# Patient Record
Sex: FEMALE | Race: BLACK OR AFRICAN AMERICAN | Employment: OTHER | URBAN - METROPOLITAN AREA
[De-identification: names, ages, dates, MRNs, and addresses within clinical notes are randomized per-mention and may not be internally consistent; named-entity substitution may affect disease eponyms.]

---

## 2017-11-08 ENCOUNTER — OFFICE VISIT (OUTPATIENT)
Dept: ENT CLINIC | Age: 72
End: 2017-11-08

## 2017-11-08 VITALS — SYSTOLIC BLOOD PRESSURE: 161 MMHG | DIASTOLIC BLOOD PRESSURE: 84 MMHG | HEART RATE: 79 BPM

## 2017-11-08 DIAGNOSIS — R22.1 NECK MASS: Primary | ICD-10-CM

## 2017-11-08 PROCEDURE — G8427 DOCREV CUR MEDS BY ELIG CLIN: HCPCS | Performed by: OTOLARYNGOLOGY

## 2017-11-08 PROCEDURE — 99203 OFFICE O/P NEW LOW 30 MIN: CPT | Performed by: OTOLARYNGOLOGY

## 2017-11-08 PROCEDURE — 3014F SCREEN MAMMO DOC REV: CPT | Performed by: OTOLARYNGOLOGY

## 2017-11-08 PROCEDURE — 3017F COLORECTAL CA SCREEN DOC REV: CPT | Performed by: OTOLARYNGOLOGY

## 2017-11-08 PROCEDURE — 4040F PNEUMOC VAC/ADMIN/RCVD: CPT | Performed by: OTOLARYNGOLOGY

## 2017-11-08 PROCEDURE — G8484 FLU IMMUNIZE NO ADMIN: HCPCS | Performed by: OTOLARYNGOLOGY

## 2017-11-08 PROCEDURE — G8400 PT W/DXA NO RESULTS DOC: HCPCS | Performed by: OTOLARYNGOLOGY

## 2017-11-08 PROCEDURE — 1036F TOBACCO NON-USER: CPT | Performed by: OTOLARYNGOLOGY

## 2017-11-08 PROCEDURE — 1123F ACP DISCUSS/DSCN MKR DOCD: CPT | Performed by: OTOLARYNGOLOGY

## 2017-11-08 PROCEDURE — 1090F PRES/ABSN URINE INCON ASSESS: CPT | Performed by: OTOLARYNGOLOGY

## 2017-11-08 PROCEDURE — G8421 BMI NOT CALCULATED: HCPCS | Performed by: OTOLARYNGOLOGY

## 2017-11-08 RX ORDER — CLINDAMYCIN HYDROCHLORIDE 300 MG/1
300 CAPSULE ORAL 3 TIMES DAILY
Qty: 30 CAPSULE | Refills: 0 | Status: SHIPPED | OUTPATIENT
Start: 2017-11-08 | End: 2020-06-17 | Stop reason: ALTCHOICE

## 2017-11-08 RX ORDER — LISINOPRIL 40 MG/1
TABLET ORAL
Refills: 5 | Status: ON HOLD | COMMUNITY
Start: 2017-10-30 | End: 2022-08-22 | Stop reason: SDUPTHER

## 2017-11-08 RX ORDER — AMLODIPINE BESYLATE 10 MG/1
10 TABLET ORAL DAILY
Status: ON HOLD | COMMUNITY
Start: 2014-04-10

## 2017-11-08 RX ORDER — GABAPENTIN 300 MG/1
300 CAPSULE ORAL 2 TIMES DAILY
Status: ON HOLD | COMMUNITY

## 2017-11-08 RX ORDER — ASPIRIN 81 MG/1
81 TABLET, CHEWABLE ORAL
Status: ON HOLD | COMMUNITY
Start: 2012-10-02 | End: 2018-06-09 | Stop reason: HOSPADM

## 2017-11-08 RX ORDER — BUSPIRONE HYDROCHLORIDE 7.5 MG/1
TABLET ORAL
Refills: 5 | Status: ON HOLD | COMMUNITY
Start: 2017-10-20

## 2017-11-08 ASSESSMENT — ENCOUNTER SYMPTOMS
SINUS PRESSURE: 0
EYES NEGATIVE: 1
TROUBLE SWALLOWING: 0
VOICE CHANGE: 0
ALLERGIC/IMMUNOLOGIC NEGATIVE: 1
SINUS PAIN: 0
SORE THROAT: 0
RHINORRHEA: 0
RESPIRATORY NEGATIVE: 1

## 2017-11-11 LAB
ANAEROBIC CULTURE: ABNORMAL
GRAM STAIN RESULT: ABNORMAL
WOUND/ABSCESS: ABNORMAL

## 2018-06-07 PROBLEM — G45.9 TIA (TRANSIENT ISCHEMIC ATTACK): Status: ACTIVE | Noted: 2018-06-07

## 2020-06-17 ENCOUNTER — APPOINTMENT (OUTPATIENT)
Dept: GENERAL RADIOLOGY | Age: 75
End: 2020-06-17
Payer: MEDICARE

## 2020-06-17 ENCOUNTER — HOSPITAL ENCOUNTER (EMERGENCY)
Age: 75
Discharge: HOME | End: 2020-06-17
Attending: EMERGENCY MEDICINE
Payer: MEDICARE

## 2020-06-17 ENCOUNTER — APPOINTMENT (OUTPATIENT)
Dept: CT IMAGING | Age: 75
End: 2020-06-17
Payer: MEDICARE

## 2020-06-17 VITALS
WEIGHT: 170 LBS | OXYGEN SATURATION: 98 % | RESPIRATION RATE: 20 BRPM | BODY MASS INDEX: 28.32 KG/M2 | HEIGHT: 65 IN | SYSTOLIC BLOOD PRESSURE: 146 MMHG | HEART RATE: 74 BPM | DIASTOLIC BLOOD PRESSURE: 88 MMHG | TEMPERATURE: 97.9 F

## 2020-06-17 PROBLEM — R53.1 WEAKNESS: Status: ACTIVE | Noted: 2020-06-17

## 2020-06-17 LAB
ANION GAP SERPL CALCULATED.3IONS-SCNC: 8 MMOL/L (ref 3–16)
APTT: 28.8 SEC (ref 24.2–36.2)
BASOPHILS ABSOLUTE: 0.1 K/UL (ref 0–0.2)
BASOPHILS RELATIVE PERCENT: 0.9 %
BILIRUBIN URINE: NEGATIVE
BLOOD, URINE: NEGATIVE
BUN BLDV-MCNC: 22 MG/DL (ref 7–20)
CALCIUM SERPL-MCNC: 9.6 MG/DL (ref 8.3–10.6)
CHLORIDE BLD-SCNC: 104 MMOL/L (ref 99–110)
CLARITY: CLEAR
CO2: 27 MMOL/L (ref 21–32)
COLOR: YELLOW
CREAT SERPL-MCNC: 0.8 MG/DL (ref 0.6–1.2)
EKG ATRIAL RATE: 75 BPM
EKG DIAGNOSIS: NORMAL
EKG P AXIS: 51 DEGREES
EKG P-R INTERVAL: 172 MS
EKG Q-T INTERVAL: 446 MS
EKG QRS DURATION: 156 MS
EKG QTC CALCULATION (BAZETT): 498 MS
EKG R AXIS: -46 DEGREES
EKG T AXIS: 48 DEGREES
EKG VENTRICULAR RATE: 75 BPM
EOSINOPHILS ABSOLUTE: 0.2 K/UL (ref 0–0.6)
EOSINOPHILS RELATIVE PERCENT: 3.4 %
EPITHELIAL CELLS, UA: 1 /HPF (ref 0–5)
GFR AFRICAN AMERICAN: >60
GFR NON-AFRICAN AMERICAN: >60
GLUCOSE BLD-MCNC: 117 MG/DL (ref 70–99)
GLUCOSE BLD-MCNC: 132 MG/DL (ref 70–99)
GLUCOSE URINE: NEGATIVE MG/DL
HCT VFR BLD CALC: 35.3 % (ref 36–48)
HEMOGLOBIN: 11.6 G/DL (ref 12–16)
HYALINE CASTS: 1 /LPF (ref 0–8)
INR BLD: 0.97 (ref 0.86–1.14)
KETONES, URINE: NEGATIVE MG/DL
LEUKOCYTE ESTERASE, URINE: NEGATIVE
LYMPHOCYTES ABSOLUTE: 1.5 K/UL (ref 1–5.1)
LYMPHOCYTES RELATIVE PERCENT: 25.5 %
MCH RBC QN AUTO: 26.9 PG (ref 26–34)
MCHC RBC AUTO-ENTMCNC: 32.9 G/DL (ref 31–36)
MCV RBC AUTO: 81.8 FL (ref 80–100)
MICROSCOPIC EXAMINATION: YES
MONOCYTES ABSOLUTE: 0.4 K/UL (ref 0–1.3)
MONOCYTES RELATIVE PERCENT: 7.2 %
NEUTROPHILS ABSOLUTE: 3.7 K/UL (ref 1.7–7.7)
NEUTROPHILS RELATIVE PERCENT: 63 %
NITRITE, URINE: NEGATIVE
PDW BLD-RTO: 14.2 % (ref 12.4–15.4)
PERFORMED ON: ABNORMAL
PH UA: 7.5 (ref 5–8)
PLATELET # BLD: 249 K/UL (ref 135–450)
PMV BLD AUTO: 7.7 FL (ref 5–10.5)
POTASSIUM REFLEX MAGNESIUM: 3.7 MMOL/L (ref 3.5–5.1)
PRO-BNP: 448 PG/ML (ref 0–449)
PROTEIN UA: 30 MG/DL
PROTHROMBIN TIME: 11.2 SEC (ref 10–13.2)
RBC # BLD: 4.32 M/UL (ref 4–5.2)
RBC UA: 4 /HPF (ref 0–4)
SODIUM BLD-SCNC: 139 MMOL/L (ref 136–145)
SPECIFIC GRAVITY UA: 1.01 (ref 1–1.03)
TROPONIN: <0.01 NG/ML
URINE REFLEX TO CULTURE: ABNORMAL
URINE TYPE: ABNORMAL
UROBILINOGEN, URINE: 0.2 E.U./DL
WBC # BLD: 5.9 K/UL (ref 4–11)
WBC UA: 1 /HPF (ref 0–5)

## 2020-06-17 PROCEDURE — 81001 URINALYSIS AUTO W/SCOPE: CPT

## 2020-06-17 PROCEDURE — 2580000003 HC RX 258: Performed by: NURSE PRACTITIONER

## 2020-06-17 PROCEDURE — 84484 ASSAY OF TROPONIN QUANT: CPT

## 2020-06-17 PROCEDURE — 80048 BASIC METABOLIC PNL TOTAL CA: CPT

## 2020-06-17 PROCEDURE — 72125 CT NECK SPINE W/O DYE: CPT

## 2020-06-17 PROCEDURE — 85730 THROMBOPLASTIN TIME PARTIAL: CPT

## 2020-06-17 PROCEDURE — 85610 PROTHROMBIN TIME: CPT

## 2020-06-17 PROCEDURE — 93005 ELECTROCARDIOGRAM TRACING: CPT | Performed by: NURSE PRACTITIONER

## 2020-06-17 PROCEDURE — 93010 ELECTROCARDIOGRAM REPORT: CPT | Performed by: INTERNAL MEDICINE

## 2020-06-17 PROCEDURE — 85025 COMPLETE CBC W/AUTO DIFF WBC: CPT

## 2020-06-17 PROCEDURE — 70450 CT HEAD/BRAIN W/O DYE: CPT

## 2020-06-17 PROCEDURE — 99284 EMERGENCY DEPT VISIT MOD MDM: CPT

## 2020-06-17 PROCEDURE — 71045 X-RAY EXAM CHEST 1 VIEW: CPT

## 2020-06-17 PROCEDURE — 1200000000 HC SEMI PRIVATE

## 2020-06-17 PROCEDURE — 83880 ASSAY OF NATRIURETIC PEPTIDE: CPT

## 2020-06-17 RX ORDER — 0.9 % SODIUM CHLORIDE 0.9 %
1000 INTRAVENOUS SOLUTION INTRAVENOUS ONCE
Status: COMPLETED | OUTPATIENT
Start: 2020-06-17 | End: 2020-06-17

## 2020-06-17 RX ADMIN — SODIUM CHLORIDE 1000 ML: 9 INJECTION, SOLUTION INTRAVENOUS at 12:45

## 2020-06-17 ASSESSMENT — ENCOUNTER SYMPTOMS
ABDOMINAL PAIN: 0
VOMITING: 0
SORE THROAT: 0
SHORTNESS OF BREATH: 0
NAUSEA: 0
RHINORRHEA: 0
DIARRHEA: 0
CONSTIPATION: 0
BLOOD IN STOOL: 0

## 2020-06-17 NOTE — ED PROVIDER NOTES
[address] [facility]        Pt Name: Mindi Joseph Saturday  MRN: [id number]  Armsjjgfmeng 1945  Date of evaluation: 6/17/2020  Provider: STACEY Wiggins CNP  PCP: [facility] (Inactive)    This patient was seen and evaluated by the attending physician Scott Bergeron MD.    [address] [place] [address]       Chief Complaint   Patient presents with    Dizziness     Pt comes in today by [facility] from home. Pt fell sometime over the weekend and hit the back of head. Denies LOC. Pt states feeling lightheaded and dizzy since then        HISTORY OF PRESENT ILLNESS   (Location/Symptom, Timing/Onset,Context/Setting, Quality, Duration, Modifying Factors, Severity)  Note limiting factors. Mindi Joseph Saturday is a 67 y.o. female who presents emergency department with concern for evaluation after a fall. She reports to nursing that she fell over the weekend but tells me it may have happened sometime last week. She is a very poor historian. It does appear that there is history of stroke in her chart. She supposed to be taking Plavix according to her medical record but denies this. She reports that she will take baby aspirin, \"from time to time\" when she remembers. She reports that she does live with her son and 2 grandsons. She ambulates independently without a walker at baseline. There is no loss of consciousness after her fall. She also denies head injury. She reports it was a trip over her own 2 feet with no syncope. She denies fever, rash, headaches, chest pain, shortness of breath, cough, congestion, abdominal pain, nausea, vomiting, diarrhea, constipation, blood in the stool, or painful urination. No family at bedside. Nursing Notes triage note reviewed and agreed with or any disagreements were addressed  in the HPI.     REVIEW OF SYSTEMS    (2-9 systems for level 4, 10 or more for level 5)     Review of Systems making. CONSULTS:  IP CONSULT TO HOSPITALIST      EMERGENCY DEPARTMENT COURSE and DIFFERENTIAL DIAGNOSIS/MDM:   Vitals:    Vitals:    06/17/20 1025 06/17/20 1030 06/17/20 1300   BP:  (!) 162/73 (!) 146/88   Pulse: 76  74   Resp: 18  20   Temp: 97.9 °F (36.6 °C)     TempSrc: Oral     SpO2: 98%     Weight: 170 lb (77.1 kg)     Height: 5' 5\" (1.651 m)         Naomie Saturday was given the following medications:  Medications   0.9 % sodium chloride bolus (1,000 mLs Intravenous New Bag 6/17/20 1245)       Seferino Dyson Saturday was evaluated in the emergency department with concern for dizziness and lightheadedness. Truncal ataxia noted on my initial exam, however this resolved while she was in the ER. She is given IV fluids. Laboratory evaluation and imaging performed. This is all grossly negative. Nursing was able to get the patient up and ambulate her. She is not orthostatic on vital signs. She did walk with a walker with assistance. She reports that she is still dizzy and lightheaded. For this reason I feel inpatient management is warranted. We did consult the hospitalist who is in agreement for admission, however when my attending went back to update the patient she declined admission. She would prefer to go home. She did have a recent work-up for TIA. She was instructed to continue taking her Plavix and follow-up with PCP using shared decision-making model. The neurological exam is intact. At this time there is no indication of cardiac arrhythmia, PE, CVA, seizures, dehydration, hypoglycemia, subarachnoid hemorrhage or hypotension. TIA is within the differentials. My suspicion is low for serious injury including fracture, dislocation, compartment syndrome, or intracranial hemorrhage. The injury sounds consistent with mechanical fall. My suspicion is low for arrhythmia, stroke, seizure, abuse, or PE. Naomie Saturday is stable in the ER and safe to follow as an outpatient.   The patient

## 2020-06-17 NOTE — ED NOTES
Bed: 15  Expected date:   Expected time:   Means of arrival:   Comments:  sheldon Ernst RN  06/17/20 1024

## 2020-06-17 NOTE — ED NOTES
Pt comes in today by [facility] from home due to dizziness. Pt states she fell over the weekend and hit the back of her head. Denies LOC. Just c/o dizziness and lightheadedness since then. Pt alert and oriented. Pt has HX of stroke. VSS. IV started and labs obtained per order. Pt tolerated well. Pt connected to monitors and BP set to cycle. Updated pt on POC; verbalized understanding. Siderails up x 2, call light within reach. Pt denies any further needs at this time. Will continue to monitor.         Nayla Tiwari RN  06/17/20 5448

## 2020-06-17 NOTE — ED PROVIDER NOTES
alternatives to staying in the hospital. She wants to leave. I encouraged to continue taking medications at home including the atorvastatin and captopril and so she will follow-up with primary care doctor. Patient Referrals: Your PCP            Discharge Medications:  New Prescriptions    No medications on file       FINAL IMPRESSION  1. TIA (transient ischemic attack)    2. Near syncope    3. Vertigo        Blood pressure (!) 146/88, pulse 74, temperature 97.9 °F (36.6 °C), temperature source Oral, resp. rate 20, height 5' 5\" (1.651 m), weight 170 lb (77.1 kg), SpO2 98 %. For further details of [facility] emergency department encounter, please see documentation by advanced practice providerMelanie.         Promise Tariq MD  06/17/20 1149       Promise Tariq MD  06/17/20 1411       Promise Tariq MD  06/17/20 96 Kalpesh Felix MD  06/17/20 6795

## 2021-01-01 ENCOUNTER — APPOINTMENT (OUTPATIENT)
Dept: GENERAL RADIOLOGY | Age: 76
End: 2021-01-01
Payer: MEDICARE

## 2021-01-01 ENCOUNTER — HOSPITAL ENCOUNTER (EMERGENCY)
Age: 76
Discharge: HOME | End: 2021-01-01
Payer: MEDICARE

## 2021-01-01 VITALS
SYSTOLIC BLOOD PRESSURE: 168 MMHG | OXYGEN SATURATION: 96 % | DIASTOLIC BLOOD PRESSURE: 93 MMHG | TEMPERATURE: 97 F | BODY MASS INDEX: 28.32 KG/M2 | HEART RATE: 100 BPM | WEIGHT: 170 LBS | HEIGHT: 65 IN | RESPIRATION RATE: 18 BRPM

## 2021-01-01 DIAGNOSIS — M25.50 POLYARTHRALGIA: ICD-10-CM

## 2021-01-01 DIAGNOSIS — M25.462 EFFUSION OF LEFT KNEE: Primary | ICD-10-CM

## 2021-01-01 PROCEDURE — 73130 X-RAY EXAM OF HAND: CPT

## 2021-01-01 PROCEDURE — 99284 EMERGENCY DEPT VISIT MOD MDM: CPT

## 2021-01-01 PROCEDURE — 73562 X-RAY EXAM OF KNEE 3: CPT

## 2021-01-01 PROCEDURE — 73110 X-RAY EXAM OF WRIST: CPT

## 2021-01-01 PROCEDURE — 6370000000 HC RX 637 (ALT 250 FOR IP): Performed by: PHYSICIAN ASSISTANT

## 2021-01-01 RX ORDER — ONDANSETRON 4 MG/1
4 TABLET, ORALLY DISINTEGRATING ORAL ONCE
Status: COMPLETED | OUTPATIENT
Start: 2021-01-01 | End: 2021-01-01

## 2021-01-01 RX ORDER — HYDROCODONE BITARTRATE AND ACETAMINOPHEN 5; 325 MG/1; MG/1
1 TABLET ORAL EVERY 6 HOURS PRN
Qty: 10 TABLET | Refills: 0 | Status: SHIPPED | OUTPATIENT
Start: 2021-01-01 | End: 2021-01-04

## 2021-01-01 RX ORDER — HYDROCODONE BITARTRATE AND ACETAMINOPHEN 5; 325 MG/1; MG/1
2 TABLET ORAL ONCE
Status: COMPLETED | OUTPATIENT
Start: 2021-01-01 | End: 2021-01-01

## 2021-01-01 RX ORDER — ONDANSETRON 4 MG/1
4 TABLET, ORALLY DISINTEGRATING ORAL EVERY 8 HOURS PRN
Qty: 20 TABLET | Refills: 0 | Status: ON HOLD | OUTPATIENT
Start: 2021-01-01

## 2021-01-01 RX ADMIN — ONDANSETRON 4 MG: 4 TABLET, ORALLY DISINTEGRATING ORAL at 12:44

## 2021-01-01 RX ADMIN — HYDROCODONE BITARTRATE AND ACETAMINOPHEN 2 TABLET: 5; 325 TABLET ORAL at 12:44

## 2021-01-01 ASSESSMENT — PAIN SCALES - GENERAL: PAINLEVEL_OUTOF10: 0

## 2021-01-01 ASSESSMENT — ENCOUNTER SYMPTOMS
VOMITING: 0
SHORTNESS OF BREATH: 0
COUGH: 0
DIARRHEA: 0
RHINORRHEA: 0
ABDOMINAL PAIN: 0
NAUSEA: 0

## 2021-01-01 NOTE — ED NOTES
RN into room for d/c and pt is not in room.  Wheelchair is not in room, nephew has left w pt, RN will call family member, RN called alberto at [phone], updated on discharge paperwork and rx     Nirmala Ga, EARNEST  01/01/21 Cornelius 24, EARNEST  01/01/21 9348

## 2021-01-01 NOTE — ED NOTES
Pt asking for coffee, pt reports pain improved and zero, pt given warm blanket, pt pulled up in bed, rn asked pt if she has a ride home and gave pt phone to call family to start planning for dc,     Julia Caal RN  01/01/21 0913

## 2021-01-01 NOTE — ED NOTES
rn into apply ace wrap to left knee per order, rn asssited pt to get dressed, pt reports zero pain, rn assisted pt to end of bed and tried to assist to wheel chair,pt refused, pt asked why rn was rushing her and thought she would be staying a night in the hospital, rn explained her pain is resolved and there is no need, rn explained she has follow up visits next week and her sister was notified and would like her home. RN continued to assist pt to stand and pt pushed against RN. RN asked Jenifer Be RN to help  Assist pt up, pt continued to refused and push against both rns. rn placed pt back to bed and side rails up x2, rn notified Neena Baumgarten PA, was told pt is to go home and place pt in wheelchair per daughters request. Per daughter pt is in wheelchair all day. RN notified charge rn,   RN went to entry to ask nephew to assist rn to help pt get to wheelchair for d/c. At same time, dejan tohmas communicated w pt and pt communicated she was scared. Pt is ok to go home. Nephew is now at bs w rn and pt and plan is to go home. Will do d/c with pt and family member.  Pt is doing well in wheelchair     [address], RN  01/01/21 826 [facility], RN  01/01/21 826 [facility], RN  01/01/21 [address] [place] [address], RN  01/01/21 8609

## 2021-01-01 NOTE — ED NOTES
Bed: 30  Expected date:   Expected time:   Means of arrival: [facility]  Comments:  Shoaib Cutler 38 [facility], Critical access hospital0 [facility]  01/01/21 1139

## 2021-01-01 NOTE — ED PROVIDER NOTES
[facility]        Pt Name: Rc Canchola Saturday  MRN: [id number]  Melvagfmeng 1945  Date of evaluation: 1/1/2021  Provider: Satnam Barajas PA-C  PCP: [facility] (Inactive)    DANELLE. I have evaluated this patient. My supervising physician was available for consultation. CHIEF COMPLAINT       Chief Complaint   Patient presents with    Knee Pain     pt from home via em[facility] [place] for left knee pain, left ankle pain, right hand/wrist pain,        HISTORY OF PRESENT ILLNESS   (Location, Timing/Onset, Context/Setting, Quality, Duration, Modifying Factors, Severity, Associated Signs and Symptoms)  Note limiting factors. Rc Canchola Saturday is a 67 y.o. female who presents to the emergency department today for evaluation for left knee pain, right wrist and right hand pain. The patient states that when she woke up this morning she noticed some pain and swelling noted to her right hand and her right wrist.  States that she also had some pain to her left knee. The patient denies falling or injuring herself in any way. She states that mostly she sits in her wheelchair but she was able to stand up and bear some weight on the knee. The patient denies any history of gout. She has no numbness, tingling or weakness. She denies any chest pain or shortness of breath. She has no fever or chills. No cough or congestion. She is no abdominal pain, nausea, vomiting or diarrhea. No urinary symptoms. The patient adds that while she is here she would like to have an area looked at to her left upper thigh. States that she saw her primary care physician for this, and she states that she has had the area drained, she just wants to make sure that there is no further infection. Nursing Notes were all reviewed and agreed with or any disagreements were addressed in the HPI.     REVIEW OF SYSTEMS    (2-9 systems for level 4, 10 or more for level 5) for level 5)     ED Triage Vitals [01/01/21 1148]   BP Temp Temp src Pulse Resp SpO2 Height Weight   (!) 160/81 97 °F (36.1 °C) -- 106 19 98 % 5' 5\" (1.651 m) 170 lb (77.1 kg)       Physical Exam  Vitals signs and nursing note reviewed. Constitutional:       Appearance: She is well-developed. She is not diaphoretic. HENT:      Head: Normocephalic and atraumatic. Right Ear: External ear normal.      Left Ear: External ear normal.      Nose: Nose normal.   Eyes:      General:         Right eye: No discharge. Left eye: No discharge. Neck:      Musculoskeletal: Normal range of motion and neck supple. Trachea: No tracheal deviation. Cardiovascular:      Rate and Rhythm: Normal rate and regular rhythm. Pulses: Normal pulses. Pulmonary:      Effort: Pulmonary effort is normal. No respiratory distress. Breath sounds: Normal breath sounds. No wheezing or rales. Musculoskeletal: Normal range of motion. Comments: There is tenderness, and mild edema noted over the dorsum of the right hand. There is no erythema, significant warmth. She has no tenderness over the anatomic snuffbox. No pain with axial loading. Radial pulses 2+. Normal sensation light touch for neurovascularly intact peer full range of motion of all joints    She does have minimal tenderness, and edema noted of the left knee. Full range of motion of the knee. Dorsalis pedis and posterior tibialis pulse are 2+. Gait normal sensation light touch. Neurovascularly intact. Skin:     General: Skin is warm and dry. Comments: Well-healing 1 cm x 1 cm abscess noted to the left upper thigh. Packing in place. No surrounding erythema. No drainage. No warmth. Neurological:      Mental Status: She is alert and oriented to person, place, and time.    Psychiatric:         Behavior: Behavior normal.         DIAGNOSTIC RESULTS   LABS:    Labs Reviewed - No data to display    All other labs were within normal range or not returned as of this dictation. EKG: All EKG's are interpreted by the Emergency Department Physician in the absence of a cardiologist.  Please see their note for interpretation of EKG. RADIOLOGY:   Non-plain film images such as CT, Ultrasound and MRI are read by the radiologist. Plain radiographic images are visualized and preliminarily interpreted by the ED Provider with the below findings:        Interpretation per the Radiologist below, if available at the time of this note:    XR WRIST RIGHT (MIN 3 VIEWS)   Final Result   No acute findings involving the right wrist or right hand. Large joint effusion at the knee. Tricompartment osteoarthrosis most   prominent medially and patellofemoral compartment laterally. XR HAND RIGHT (MIN 3 VIEWS)   Final Result   No acute findings involving the right wrist or right hand. Large joint effusion at the knee. Tricompartment osteoarthrosis most   prominent medially and patellofemoral compartment laterally. XR KNEE LEFT (3 VIEWS)   Final Result   No acute findings involving the right wrist or right hand. Large joint effusion at the knee. Tricompartment osteoarthrosis most   prominent medially and patellofemoral compartment laterally. No results found.         PROCEDURES   Unless otherwise noted below, none     Procedures    CRITICAL CARE TIME   N/A    CONSULTS:  None      EMERGENCY DEPARTMENT COURSE and DIFFERENTIAL DIAGNOSIS/MDM:   Vitals:    Vitals:    01/01/21 1312 01/01/21 1317 01/01/21 1324 01/01/21 1336   BP:       Pulse: 100 104 98 100   Resp: 19 16 18    Temp:       SpO2:  95% 96%    Weight:       Height:           Patient was given the following medications:  Medications   HYDROcodone-acetaminophen (NORCO) 5-325 MG per tablet 2 tablet (2 tablets Oral Given 1/1/21 1244)   ondansetron (ZOFRAN-ODT) disintegrating tablet 4 mg (4 mg Oral Given 1/1/21 1244)           Briefly, this is a 71-year-old female who presents to the emergency department today for evaluation for right hand pain, right hand swelling, as well as left knee pain left knee swelling. The patient states that she woke up this morning with the symptoms. She denies falling or injuring herself in any way. On exam she does have some tenderness and edema noted to the right hand, right wrist, x-rays are negative    She does have tenderness and minimal edema noted to the left knee, x-ray shows a large joint effusion at the knee, tricompartmental osteoarthrosis. She does have a well-healing wound to the left upper thigh, packing in place, no surrounding erythema, edema or warmth. Discussed the case with the patient's sister, Cynthia Bryant, and she states that the patient is acting at her baseline. She states that the patient does have several appointments next week, and she states that she feels that the patient is okay to go home. The patient does live with her nephew, and another young man I can help her get around. Patient is in the wheelchair at home. I discussed this with the patient, the patient is comfortable going home. She will be given a prescription for Norco and Zofran for at home. She is to obtain follow-up with her primary care physician within 1 to 2 days for reevaluation. She is also referred to orthopedics for knee effusion. Patient is stable for discharge. My suspicions at this time for occult fracture, dislocation, subluxation, arterial occlusion or other emergent etiology. FINAL IMPRESSION      1. Effusion of left knee    2. Polyarthralgia          DISPOSITION/PLAN   DISPOSITION Decision To Discharge 01/01/2021 02:04:37 PM      PATIENT REFERREDTO:  Arturo Caldwell MD  [address] [place] [address].   [address] [place] [address]    Schedule an appointment as soon as possible for a visit in 3 days      [facility] Emergency Department  [address] [place] [address]  [phone]    As needed, If symptoms worsen      DISCHARGE MEDICATIONS:  New Prescriptions    HYDROCODONE-ACETAMINOPHEN (NORCO) 5-325 MG PER TABLET    Take 1 tablet by mouth every 6 hours as needed for Pain for up to 3 days.     ONDANSETRON (ZOFRAN ODT) 4 MG DISINTEGRATING TABLET    Take 1 tablet by mouth every 8 hours as needed for Nausea       DISCONTINUED MEDICATIONS:  Discontinued Medications    No medications on file              (Please note that portions of this note were completed with a voice recognition program.  Efforts were made to edit the dictations but occasionally words are mis-transcribed.)    Catrina Marks PA-C (electronically signed)            Catrina Marks PA-C  01/01/21 1546

## 2021-01-01 NOTE — ED NOTES
Pt in gown, bed locked, call light near, cardiac monitor cycling  Pt belongings placed on chair,     Josie Mohamud, EARNEST  01/01/21 Joey Dubon, RN  01/01/21

## 2021-01-06 ENCOUNTER — TELEPHONE (OUTPATIENT)
Dept: ORTHOPEDIC SURGERY | Age: 76
End: 2021-01-06

## 2022-04-05 ENCOUNTER — APPOINTMENT (OUTPATIENT)
Dept: GENERAL RADIOLOGY | Age: 77
End: 2022-04-05
Payer: MEDICARE

## 2022-04-05 ENCOUNTER — HOSPITAL ENCOUNTER (EMERGENCY)
Age: 77
Discharge: HOME | End: 2022-04-05
Attending: EMERGENCY MEDICINE
Payer: MEDICARE

## 2022-04-05 ENCOUNTER — APPOINTMENT (OUTPATIENT)
Dept: CT IMAGING | Age: 77
End: 2022-04-05
Payer: MEDICARE

## 2022-04-05 VITALS
BODY MASS INDEX: 25.99 KG/M2 | SYSTOLIC BLOOD PRESSURE: 169 MMHG | TEMPERATURE: 98.4 F | WEIGHT: 156 LBS | OXYGEN SATURATION: 96 % | DIASTOLIC BLOOD PRESSURE: 86 MMHG | HEART RATE: 78 BPM | RESPIRATION RATE: 18 BRPM | HEIGHT: 65 IN

## 2022-04-05 DIAGNOSIS — I50.20 SYSTOLIC CONGESTIVE HEART FAILURE, UNSPECIFIED HF CHRONICITY (HCC): ICD-10-CM

## 2022-04-05 DIAGNOSIS — M79.89 LEG SWELLING: Primary | ICD-10-CM

## 2022-04-05 LAB
ANION GAP SERPL CALCULATED.3IONS-SCNC: 10 MMOL/L (ref 3–16)
BASOPHILS ABSOLUTE: 0.1 K/UL (ref 0–0.2)
BASOPHILS RELATIVE PERCENT: 0.8 %
BUN BLDV-MCNC: 19 MG/DL (ref 7–20)
CALCIUM SERPL-MCNC: 9.6 MG/DL (ref 8.3–10.6)
CHLORIDE BLD-SCNC: 105 MMOL/L (ref 99–110)
CO2: 25 MMOL/L (ref 21–32)
CREAT SERPL-MCNC: 0.9 MG/DL (ref 0.6–1.2)
EOSINOPHILS ABSOLUTE: 0.2 K/UL (ref 0–0.6)
EOSINOPHILS RELATIVE PERCENT: 3 %
GFR AFRICAN AMERICAN: >60
GFR NON-AFRICAN AMERICAN: >60
GLUCOSE BLD-MCNC: 166 MG/DL (ref 70–99)
HCT VFR BLD CALC: 34.5 % (ref 36–48)
HEMOGLOBIN: 11 G/DL (ref 12–16)
LYMPHOCYTES ABSOLUTE: 2.8 K/UL (ref 1–5.1)
LYMPHOCYTES RELATIVE PERCENT: 39.9 %
MCH RBC QN AUTO: 25.5 PG (ref 26–34)
MCHC RBC AUTO-ENTMCNC: 31.9 G/DL (ref 31–36)
MCV RBC AUTO: 80 FL (ref 80–100)
MONOCYTES ABSOLUTE: 0.5 K/UL (ref 0–1.3)
MONOCYTES RELATIVE PERCENT: 7.2 %
NEUTROPHILS ABSOLUTE: 3.4 K/UL (ref 1.7–7.7)
NEUTROPHILS RELATIVE PERCENT: 49.1 %
PDW BLD-RTO: 15.3 % (ref 12.4–15.4)
PLATELET # BLD: 290 K/UL (ref 135–450)
PMV BLD AUTO: 7.3 FL (ref 5–10.5)
POTASSIUM REFLEX MAGNESIUM: 3.7 MMOL/L (ref 3.5–5.1)
PRO-BNP: 766 PG/ML (ref 0–449)
RBC # BLD: 4.32 M/UL (ref 4–5.2)
SODIUM BLD-SCNC: 140 MMOL/L (ref 136–145)
TROPONIN: <0.01 NG/ML
WBC # BLD: 6.9 K/UL (ref 4–11)

## 2022-04-05 PROCEDURE — 6360000002 HC RX W HCPCS: Performed by: EMERGENCY MEDICINE

## 2022-04-05 PROCEDURE — 84484 ASSAY OF TROPONIN QUANT: CPT

## 2022-04-05 PROCEDURE — 83880 ASSAY OF NATRIURETIC PEPTIDE: CPT

## 2022-04-05 PROCEDURE — 93005 ELECTROCARDIOGRAM TRACING: CPT | Performed by: EMERGENCY MEDICINE

## 2022-04-05 PROCEDURE — 71045 X-RAY EXAM CHEST 1 VIEW: CPT

## 2022-04-05 PROCEDURE — 36415 COLL VENOUS BLD VENIPUNCTURE: CPT

## 2022-04-05 PROCEDURE — 99284 EMERGENCY DEPT VISIT MOD MDM: CPT

## 2022-04-05 PROCEDURE — 96374 THER/PROPH/DIAG INJ IV PUSH: CPT

## 2022-04-05 PROCEDURE — 85025 COMPLETE CBC W/AUTO DIFF WBC: CPT

## 2022-04-05 PROCEDURE — 80048 BASIC METABOLIC PNL TOTAL CA: CPT

## 2022-04-05 PROCEDURE — 70450 CT HEAD/BRAIN W/O DYE: CPT

## 2022-04-05 PROCEDURE — 6370000000 HC RX 637 (ALT 250 FOR IP): Performed by: EMERGENCY MEDICINE

## 2022-04-05 RX ORDER — METOLAZONE 2.5 MG/1
2.5 TABLET ORAL DAILY
Status: DISCONTINUED | OUTPATIENT
Start: 2022-04-05 | End: 2022-04-05 | Stop reason: HOSPADM

## 2022-04-05 RX ORDER — POTASSIUM CHLORIDE 750 MG/1
10 TABLET, EXTENDED RELEASE ORAL 2 TIMES DAILY
Qty: 180 TABLET | Refills: 1 | Status: ON HOLD | OUTPATIENT
Start: 2022-04-05

## 2022-04-05 RX ORDER — FUROSEMIDE 10 MG/ML
60 INJECTION INTRAMUSCULAR; INTRAVENOUS ONCE
Status: COMPLETED | OUTPATIENT
Start: 2022-04-05 | End: 2022-04-05

## 2022-04-05 RX ORDER — FUROSEMIDE 20 MG/1
20 TABLET ORAL 2 TIMES DAILY
Qty: 60 TABLET | Refills: 0 | Status: ON HOLD | OUTPATIENT
Start: 2022-04-05 | End: 2022-08-22 | Stop reason: SDUPTHER

## 2022-04-05 RX ADMIN — METOLAZONE 2.5 MG: 2.5 TABLET ORAL at 15:57

## 2022-04-05 RX ADMIN — FUROSEMIDE 60 MG: 10 INJECTION, SOLUTION INTRAMUSCULAR; INTRAVENOUS at 15:57

## 2022-04-05 NOTE — ED PROVIDER NOTES
Emergency Department Encounter    Patient: Sampson Galarza Saturday  MRN: [id number]  : 1945  Date of Evaluation: 2022  ED Provider:  Kimberly Hall MD    Triage Chief Complaint:   Leg Swelling (from home bilateral leg swelling, feeling unbalanced x 2 weeks. Hx of diabetes and neuropathy. . AOx4.)    [place]:  Sampson Galarza Saturday is a 65 y.o. female that presents ER for evaluation of acute on chronic lower extremity edema was referred from her visiting home nurse. She denies any active chest pain occasionally complains of remittent dizziness upon waking up she has a prior history of stroke hypertension diabetes and peripheral neuropathy. Complaint lower extremity edema concern about the possibility of decompensated congestive heart failure. No chest pain or shortness of breath no abdominal pain. No acute severe headache, no double vision. No focal motor or sensory deficit new    ROS - see HPI, below listed is current ROS at time of my eval:  General:  No fevers, no chills  Eyes:  No recent vison changes, no discharge  ENT:  No sore throat, no nasal congestion, no hearing changes  Cardiovascular:  No chest pain, no palpitations  Respiratory:  + shortness of breath, no cough, no wheezing  Gastrointestinal:  No pain, no nausea, no vomiting, no diarrhea  Musculoskeletal:  No muscle pain, no joint pain positive lower extremity edema  Skin:  No rash, no pruritis, no easy bruising  Neurologic:  No speech problems, no headache, dizziness  Psychiatric:  No anxiety  Genitourinary:  No dysuria, no hematuria  Endocrine:  No unexpected weight gain, no unexpected weight loss  Extremities:  + edema, no pain    Past Medical History:   Diagnosis Date    Hypertension     Stroke (cerebrum) ([facility] [address]      Past Surgical History:   Procedure Laterality Date    CHOLECYSTECTOMY       History reviewed. No pertinent family history.   Social History     Socioeconomic History    Marital status:      Spouse name: Not on file  Number of children: Not on file    Years of education: Not on file    Highest education level: Not on file   Occupational History    Not on file   Tobacco Use    Smoking status: Never Smoker    Smokeless tobacco: Never Used   Substance and Sexual Activity    Alcohol use: No    Drug use: No    Sexual activity: Not on file   Other Topics Concern    Not on file   Social History Narrative    Not on file     Social Determinants of Health     Financial Resource Strain:     Difficulty of Paying Living Expenses: Not on file   Food Insecurity:     Worried About Running Out of Food in the Last Year: Not on file    Chantel of Food in the Last Year: Not on file   Transportation Needs:     Lack of Transportation (Medical): Not on file    Lack of Transportation (Non-Medical):  Not on file   Physical Activity:     Days of Exercise per Week: Not on file    Minutes of Exercise per Session: Not on file   Stress:     Feeling of Stress : Not on file   Social Connections:     Frequency of Communication with Friends and Family: Not on file    Frequency of Social Gatherings with Friends and Family: Not on file    Attends Islam Services: Not on file    Active Member of [address] [place] [address] or Organizations: Not on file    Attends Club or Organization Meetings: Not on file    Marital Status: Not on file   Intimate Partner Violence:     Fear of Current or Ex-Partner: Not on file    Emotionally Abused: Not on file    Physically Abused: Not on file    Sexually Abused: Not on file   Housing Stability:     Unable to Pay for Housing in the Last Year: Not on file    Number of [place] in the Last Year: Not on file    Unstable Housing in the Last Year: Not on file     Current Facility-Administered Medications   Medication Dose Route Frequency Provider Last Rate Last Admin    metOLazone (ZAROXOLYN) tablet 2.5 mg  2.5 mg Oral Daily Scra Cleveland MD   2.5 mg at 04/05/22 0883     Current Outpatient Medications Medication Sig Dispense Refill    furosemide (LASIX) 20 MG tablet Take 1 tablet by mouth 2 times daily 60 tablet 0    potassium chloride (KLOR-CON M) 10 MEQ extended release tablet Take 1 tablet by mouth 2 times daily 180 tablet 1    ondansetron (ZOFRAN ODT) 4 MG disintegrating tablet Take 1 tablet by mouth every 8 hours as needed for Nausea 20 tablet 0    atorvastatin (LIPITOR) 40 MG tablet Take 1 tablet by mouth nightly 30 tablet 3    clopidogrel (PLAVIX) 75 MG tablet Take 1 tablet by mouth daily 30 tablet 3    lisinopril (PRINIVIL;ZESTRIL) 40 MG tablet TAKE 1 TABLET BY MOUTH EVERY DAY  5    busPIRone (BUSPAR) 7.5 MG tablet TAKE 1 TABLET BY MOUTH TWICE A DAY  5    metFORMIN (GLUCOPHAGE) 500 MG tablet Take 500 mg by mouth 2 times daily (with meals)       insulin glargine (LANTUS) 100 UNIT/ML injection pen Inject 42 Units into the skin nightly       gabapentin (NEURONTIN) 300 MG capsule Take 300 mg by mouth 2 times daily.  amLODIPine (NORVASC) 10 MG tablet Take 10 mg by mouth daily        No Known Allergies    Nursing Notes Reviewed    Physical Exam:  Triage VS:    ED Triage Vitals [04/05/22 1438]   Enc Vitals Group      BP (!) 169/86      Pulse 78      Resp 18      Temp 98.4 °F (36.9 °C)      Temp Source Oral      SpO2 96 %      Weight 156 lb (70.8 kg)      Height 5' 5\" (1.651 m)      Head Circumference       Peak Flow       Pain Score       Pain Loc       Pain Edu? Excl. in [address]?         :  troponin negative creatinine normal hemoglobin 11      My pulse ox interpretation is - normal    General appearance:  No acute distress. Skin:  Warm. Dry. No rash. Neck:  Trachea midline, no JVD  Heart:  Regular rate and rhythm, normal S1 & S2.    Perfusion:  Intact  Respiratory:  rales noted bilateral bases. Respirations nonlabored.      Abdominal:  soft, nontender, bowel sounds intact, nondistended   Back:  No CVA tenderness to palpation  Extremity:    1+ extremity pitting edema bilateral lower extremities   Neurological:  Alert and oriented X 3. I have reviewed and interpreted all of the currently available lab results from this visit (if applicable):     Radiographs (if obtained):  Radiologist's Report Reviewed:  Chest x-ray: Mild vascular congestion  EKG (if obtained): (All EKG's are interpreted by myself in the absence of a cardiologist)  Sinus rhythm right bundle branch block left anterior hemiblock  MDM:  Presents ER for evaluation of mild decompensated congestive heart failure with underlying leg edema, Zaroxolyn Lasix were provided in the ER, she will be discharged on Lasix twice daily and potassium supplementation outpatient follow-up see PCP no hypoxia no troponin leak no cardiac dysrhythmias. She stable for outpatient management. Clinical Impression:  1. Leg swelling    2. Systolic congestive heart failure, unspecified HF chronicity ([address]      Disposition referral (if applicable):  STACEY Jernigan - CNP  [address] [address] [facility] [address]  [phone]          Disposition medications (if applicable):  New Prescriptions    FUROSEMIDE (LASIX) 20 MG TABLET    Take 1 tablet by mouth 2 times daily    POTASSIUM CHLORIDE (KLOR-CON M) 10 MEQ EXTENDED RELEASE TABLET    Take 1 tablet by mouth 2 times daily     ED Provider Disposition Time  DISPOSITION Decision To Discharge 04/05/2022 04:04:30 PM      Comment: Please note this report has been produced using speech recognition software and may contain errors related to that system including errors in grammar, punctuation, and spelling, as well as words and phrases that may be inappropriate. Efforts were made to edit the dictations.       Milla Goodwin MD  66/20/32 2755

## 2022-04-06 LAB
EKG ATRIAL RATE: 71 BPM
EKG DIAGNOSIS: NORMAL
EKG P AXIS: 43 DEGREES
EKG P-R INTERVAL: 186 MS
EKG Q-T INTERVAL: 456 MS
EKG QRS DURATION: 156 MS
EKG QTC CALCULATION (BAZETT): 495 MS
EKG R AXIS: -51 DEGREES
EKG T AXIS: 6 DEGREES
EKG VENTRICULAR RATE: 71 BPM

## 2022-04-06 PROCEDURE — 93010 ELECTROCARDIOGRAM REPORT: CPT | Performed by: INTERNAL MEDICINE

## 2022-06-27 ENCOUNTER — NURSE TRIAGE (OUTPATIENT)
Dept: OTHER | Facility: CLINIC | Age: 77
End: 2022-06-27

## 2022-08-19 ENCOUNTER — HOSPITAL ENCOUNTER (INPATIENT)
Age: 77
LOS: 4 days | Discharge: HOME HEALTH | DRG: 149 | End: 2022-08-25
Attending: EMERGENCY MEDICINE | Admitting: INTERNAL MEDICINE
Payer: MEDICARE

## 2022-08-19 ENCOUNTER — APPOINTMENT (OUTPATIENT)
Dept: GENERAL RADIOLOGY | Age: 77
DRG: 149 | End: 2022-08-19
Payer: MEDICARE

## 2022-08-19 ENCOUNTER — APPOINTMENT (OUTPATIENT)
Dept: CT IMAGING | Age: 77
DRG: 149 | End: 2022-08-19
Payer: MEDICARE

## 2022-08-19 DIAGNOSIS — R42 DIZZINESS: Primary | ICD-10-CM

## 2022-08-19 DIAGNOSIS — R26.2 UNABLE TO AMBULATE: ICD-10-CM

## 2022-08-19 DIAGNOSIS — N30.00 ACUTE CYSTITIS WITHOUT HEMATURIA: ICD-10-CM

## 2022-08-19 LAB
A/G RATIO: 0.9 (ref 1.1–2.2)
ALBUMIN SERPL-MCNC: 3.7 G/DL (ref 3.4–5)
ALP BLD-CCNC: 86 U/L (ref 40–129)
ALT SERPL-CCNC: 13 U/L (ref 10–40)
ANION GAP SERPL CALCULATED.3IONS-SCNC: 9 MMOL/L (ref 3–16)
AST SERPL-CCNC: 19 U/L (ref 15–37)
BACTERIA: ABNORMAL /HPF
BASOPHILS ABSOLUTE: 0.1 K/UL (ref 0–0.2)
BASOPHILS RELATIVE PERCENT: 0.8 %
BILIRUB SERPL-MCNC: 0.3 MG/DL (ref 0–1)
BILIRUBIN URINE: NEGATIVE
BLOOD, URINE: ABNORMAL
BUN BLDV-MCNC: 20 MG/DL (ref 7–20)
CALCIUM SERPL-MCNC: 9.6 MG/DL (ref 8.3–10.6)
CHLORIDE BLD-SCNC: 104 MMOL/L (ref 99–110)
CLARITY: CLEAR
CO2: 26 MMOL/L (ref 21–32)
COLOR: YELLOW
CREAT SERPL-MCNC: 0.8 MG/DL (ref 0.6–1.2)
EKG ATRIAL RATE: 76 BPM
EKG DIAGNOSIS: NORMAL
EKG P AXIS: 34 DEGREES
EKG P-R INTERVAL: 180 MS
EKG Q-T INTERVAL: 428 MS
EKG QRS DURATION: 156 MS
EKG QTC CALCULATION (BAZETT): 481 MS
EKG R AXIS: -49 DEGREES
EKG T AXIS: 5 DEGREES
EKG VENTRICULAR RATE: 76 BPM
EOSINOPHILS ABSOLUTE: 0.1 K/UL (ref 0–0.6)
EOSINOPHILS RELATIVE PERCENT: 1.3 %
EPITHELIAL CELLS, UA: 1 /HPF (ref 0–5)
GFR AFRICAN AMERICAN: >60
GFR NON-AFRICAN AMERICAN: >60
GLUCOSE BLD-MCNC: 101 MG/DL (ref 70–99)
GLUCOSE BLD-MCNC: 144 MG/DL (ref 70–99)
GLUCOSE BLD-MCNC: 147 MG/DL (ref 70–99)
GLUCOSE URINE: NEGATIVE MG/DL
HCT VFR BLD CALC: 36.5 % (ref 36–48)
HEMOGLOBIN: 11.6 G/DL (ref 12–16)
HYALINE CASTS: 0 /LPF (ref 0–8)
KETONES, URINE: NEGATIVE MG/DL
LEUKOCYTE ESTERASE, URINE: ABNORMAL
LYMPHOCYTES ABSOLUTE: 1.9 K/UL (ref 1–5.1)
LYMPHOCYTES RELATIVE PERCENT: 28.3 %
MAGNESIUM: 1.9 MG/DL (ref 1.8–2.4)
MCH RBC QN AUTO: 25.5 PG (ref 26–34)
MCHC RBC AUTO-ENTMCNC: 31.9 G/DL (ref 31–36)
MCV RBC AUTO: 79.9 FL (ref 80–100)
MICROSCOPIC EXAMINATION: YES
MONOCYTES ABSOLUTE: 0.4 K/UL (ref 0–1.3)
MONOCYTES RELATIVE PERCENT: 5.5 %
NEUTROPHILS ABSOLUTE: 4.3 K/UL (ref 1.7–7.7)
NEUTROPHILS RELATIVE PERCENT: 64.1 %
NITRITE, URINE: POSITIVE
PDW BLD-RTO: 16.1 % (ref 12.4–15.4)
PERFORMED ON: ABNORMAL
PERFORMED ON: ABNORMAL
PH UA: 6.5 (ref 5–8)
PLATELET # BLD: 287 K/UL (ref 135–450)
PMV BLD AUTO: 7.8 FL (ref 5–10.5)
POTASSIUM SERPL-SCNC: 4.1 MMOL/L (ref 3.5–5.1)
PRO-BNP: 345 PG/ML (ref 0–449)
PROTEIN UA: 100 MG/DL
RBC # BLD: 4.57 M/UL (ref 4–5.2)
RBC UA: 3 /HPF (ref 0–4)
SODIUM BLD-SCNC: 139 MMOL/L (ref 136–145)
SPECIFIC GRAVITY UA: 1.01 (ref 1–1.03)
TOTAL PROTEIN: 7.9 G/DL (ref 6.4–8.2)
TROPONIN: <0.01 NG/ML
TSH REFLEX FT4: 0.34 UIU/ML (ref 0.27–4.2)
URINE REFLEX TO CULTURE: YES
URINE TYPE: ABNORMAL
UROBILINOGEN, URINE: 0.2 E.U./DL
WBC # BLD: 6.8 K/UL (ref 4–11)
WBC UA: 24 /HPF (ref 0–5)

## 2022-08-19 PROCEDURE — 81001 URINALYSIS AUTO W/SCOPE: CPT

## 2022-08-19 PROCEDURE — 84443 ASSAY THYROID STIM HORMONE: CPT

## 2022-08-19 PROCEDURE — 70450 CT HEAD/BRAIN W/O DYE: CPT

## 2022-08-19 PROCEDURE — G0378 HOSPITAL OBSERVATION PER HR: HCPCS

## 2022-08-19 PROCEDURE — 71045 X-RAY EXAM CHEST 1 VIEW: CPT

## 2022-08-19 PROCEDURE — 6370000000 HC RX 637 (ALT 250 FOR IP): Performed by: PHYSICIAN ASSISTANT

## 2022-08-19 PROCEDURE — 6360000002 HC RX W HCPCS: Performed by: INTERNAL MEDICINE

## 2022-08-19 PROCEDURE — 93005 ELECTROCARDIOGRAM TRACING: CPT | Performed by: INTERNAL MEDICINE

## 2022-08-19 PROCEDURE — 80053 COMPREHEN METABOLIC PANEL: CPT

## 2022-08-19 PROCEDURE — 2580000003 HC RX 258: Performed by: INTERNAL MEDICINE

## 2022-08-19 PROCEDURE — 87077 CULTURE AEROBIC IDENTIFY: CPT

## 2022-08-19 PROCEDURE — 83735 ASSAY OF MAGNESIUM: CPT

## 2022-08-19 PROCEDURE — 96372 THER/PROPH/DIAG INJ SC/IM: CPT

## 2022-08-19 PROCEDURE — 84484 ASSAY OF TROPONIN QUANT: CPT

## 2022-08-19 PROCEDURE — 93005 ELECTROCARDIOGRAM TRACING: CPT | Performed by: PHYSICIAN ASSISTANT

## 2022-08-19 PROCEDURE — 99285 EMERGENCY DEPT VISIT HI MDM: CPT

## 2022-08-19 PROCEDURE — 85025 COMPLETE CBC W/AUTO DIFF WBC: CPT

## 2022-08-19 PROCEDURE — 6370000000 HC RX 637 (ALT 250 FOR IP): Performed by: INTERNAL MEDICINE

## 2022-08-19 PROCEDURE — 93010 ELECTROCARDIOGRAM REPORT: CPT | Performed by: INTERNAL MEDICINE

## 2022-08-19 PROCEDURE — 6370000000 HC RX 637 (ALT 250 FOR IP): Performed by: EMERGENCY MEDICINE

## 2022-08-19 PROCEDURE — 87086 URINE CULTURE/COLONY COUNT: CPT

## 2022-08-19 PROCEDURE — 83880 ASSAY OF NATRIURETIC PEPTIDE: CPT

## 2022-08-19 PROCEDURE — 87186 SC STD MICRODIL/AGAR DIL: CPT

## 2022-08-19 RX ORDER — CEFDINIR 300 MG/1
300 CAPSULE ORAL EVERY 12 HOURS SCHEDULED
Status: COMPLETED | OUTPATIENT
Start: 2022-08-19 | End: 2022-08-24

## 2022-08-19 RX ORDER — DIAZEPAM 5 MG/1
5 TABLET ORAL ONCE
Status: COMPLETED | OUTPATIENT
Start: 2022-08-19 | End: 2022-08-19

## 2022-08-19 RX ORDER — CLOPIDOGREL BISULFATE 75 MG/1
75 TABLET ORAL DAILY
Status: DISCONTINUED | OUTPATIENT
Start: 2022-08-19 | End: 2022-08-25 | Stop reason: HOSPADM

## 2022-08-19 RX ORDER — ATORVASTATIN CALCIUM 40 MG/1
40 TABLET, FILM COATED ORAL NIGHTLY
Status: DISCONTINUED | OUTPATIENT
Start: 2022-08-19 | End: 2022-08-25 | Stop reason: HOSPADM

## 2022-08-19 RX ORDER — SODIUM CHLORIDE 9 MG/ML
INJECTION, SOLUTION INTRAVENOUS PRN
Status: DISCONTINUED | OUTPATIENT
Start: 2022-08-19 | End: 2022-08-25 | Stop reason: HOSPADM

## 2022-08-19 RX ORDER — POLYETHYLENE GLYCOL 3350 17 G/17G
17 POWDER, FOR SOLUTION ORAL DAILY PRN
Status: DISCONTINUED | OUTPATIENT
Start: 2022-08-19 | End: 2022-08-25 | Stop reason: HOSPADM

## 2022-08-19 RX ORDER — ACETAMINOPHEN 325 MG/1
650 TABLET ORAL EVERY 6 HOURS PRN
Status: DISCONTINUED | OUTPATIENT
Start: 2022-08-19 | End: 2022-08-25 | Stop reason: HOSPADM

## 2022-08-19 RX ORDER — BUSPIRONE HYDROCHLORIDE 5 MG/1
7.5 TABLET ORAL 2 TIMES DAILY
Status: DISCONTINUED | OUTPATIENT
Start: 2022-08-19 | End: 2022-08-25 | Stop reason: HOSPADM

## 2022-08-19 RX ORDER — LISINOPRIL 20 MG/1
40 TABLET ORAL DAILY
Status: DISCONTINUED | OUTPATIENT
Start: 2022-08-19 | End: 2022-08-20

## 2022-08-19 RX ORDER — CEPHALEXIN 250 MG/1
500 CAPSULE ORAL ONCE
Status: DISCONTINUED | OUTPATIENT
Start: 2022-08-19 | End: 2022-08-19

## 2022-08-19 RX ORDER — DEXTROSE MONOHYDRATE 100 MG/ML
INJECTION, SOLUTION INTRAVENOUS CONTINUOUS PRN
Status: DISCONTINUED | OUTPATIENT
Start: 2022-08-19 | End: 2022-08-25 | Stop reason: HOSPADM

## 2022-08-19 RX ORDER — MECLIZINE HCL 12.5 MG/1
25 TABLET ORAL ONCE
Status: DISCONTINUED | OUTPATIENT
Start: 2022-08-19 | End: 2022-08-19

## 2022-08-19 RX ORDER — INSULIN LISPRO 100 [IU]/ML
0-4 INJECTION, SOLUTION INTRAVENOUS; SUBCUTANEOUS
Status: DISCONTINUED | OUTPATIENT
Start: 2022-08-19 | End: 2022-08-25 | Stop reason: HOSPADM

## 2022-08-19 RX ORDER — ONDANSETRON 4 MG/1
4 TABLET, ORALLY DISINTEGRATING ORAL EVERY 8 HOURS PRN
Status: DISCONTINUED | OUTPATIENT
Start: 2022-08-19 | End: 2022-08-25 | Stop reason: HOSPADM

## 2022-08-19 RX ORDER — ONDANSETRON 2 MG/ML
4 INJECTION INTRAMUSCULAR; INTRAVENOUS EVERY 6 HOURS PRN
Status: DISCONTINUED | OUTPATIENT
Start: 2022-08-19 | End: 2022-08-25 | Stop reason: HOSPADM

## 2022-08-19 RX ORDER — INSULIN GLARGINE 100 [IU]/ML
30 INJECTION, SOLUTION SUBCUTANEOUS NIGHTLY
Status: DISCONTINUED | OUTPATIENT
Start: 2022-08-19 | End: 2022-08-25

## 2022-08-19 RX ORDER — ACETAMINOPHEN 325 MG/1
325 TABLET ORAL ONCE
Status: COMPLETED | OUTPATIENT
Start: 2022-08-19 | End: 2022-08-19

## 2022-08-19 RX ORDER — GABAPENTIN 300 MG/1
300 CAPSULE ORAL 2 TIMES DAILY
Status: DISCONTINUED | OUTPATIENT
Start: 2022-08-19 | End: 2022-08-25 | Stop reason: HOSPADM

## 2022-08-19 RX ORDER — FUROSEMIDE 20 MG/1
20 TABLET ORAL 2 TIMES DAILY
Status: DISCONTINUED | OUTPATIENT
Start: 2022-08-19 | End: 2022-08-23

## 2022-08-19 RX ORDER — AMLODIPINE BESYLATE 5 MG/1
10 TABLET ORAL DAILY
Status: DISCONTINUED | OUTPATIENT
Start: 2022-08-19 | End: 2022-08-25 | Stop reason: HOSPADM

## 2022-08-19 RX ORDER — ACETAMINOPHEN 650 MG/1
650 SUPPOSITORY RECTAL EVERY 6 HOURS PRN
Status: DISCONTINUED | OUTPATIENT
Start: 2022-08-19 | End: 2022-08-25 | Stop reason: HOSPADM

## 2022-08-19 RX ORDER — INSULIN LISPRO 100 [IU]/ML
0-4 INJECTION, SOLUTION INTRAVENOUS; SUBCUTANEOUS NIGHTLY
Status: DISCONTINUED | OUTPATIENT
Start: 2022-08-19 | End: 2022-08-25 | Stop reason: HOSPADM

## 2022-08-19 RX ORDER — SODIUM CHLORIDE 0.9 % (FLUSH) 0.9 %
5-40 SYRINGE (ML) INJECTION PRN
Status: DISCONTINUED | OUTPATIENT
Start: 2022-08-19 | End: 2022-08-25 | Stop reason: HOSPADM

## 2022-08-19 RX ORDER — ENOXAPARIN SODIUM 100 MG/ML
40 INJECTION SUBCUTANEOUS NIGHTLY
Status: DISCONTINUED | OUTPATIENT
Start: 2022-08-19 | End: 2022-08-25 | Stop reason: HOSPADM

## 2022-08-19 RX ORDER — MECLIZINE HCL 12.5 MG/1
25 TABLET ORAL 3 TIMES DAILY
Status: DISCONTINUED | OUTPATIENT
Start: 2022-08-19 | End: 2022-08-25 | Stop reason: HOSPADM

## 2022-08-19 RX ORDER — SODIUM CHLORIDE 0.9 % (FLUSH) 0.9 %
5-40 SYRINGE (ML) INJECTION EVERY 12 HOURS SCHEDULED
Status: DISCONTINUED | OUTPATIENT
Start: 2022-08-19 | End: 2022-08-25 | Stop reason: HOSPADM

## 2022-08-19 RX ORDER — MECLIZINE HCL 12.5 MG/1
50 TABLET ORAL ONCE
Status: COMPLETED | OUTPATIENT
Start: 2022-08-19 | End: 2022-08-19

## 2022-08-19 RX ADMIN — FUROSEMIDE 20 MG: 20 TABLET ORAL at 22:09

## 2022-08-19 RX ADMIN — MECLIZINE 25 MG: 12.5 TABLET ORAL at 22:09

## 2022-08-19 RX ADMIN — ACETAMINOPHEN 325 MG: 325 TABLET ORAL at 11:04

## 2022-08-19 RX ADMIN — BUSPIRONE HYDROCHLORIDE 7.5 MG: 5 TABLET ORAL at 22:08

## 2022-08-19 RX ADMIN — CLOPIDOGREL BISULFATE 75 MG: 75 TABLET ORAL at 22:09

## 2022-08-19 RX ADMIN — ATORVASTATIN CALCIUM 40 MG: 40 TABLET, FILM COATED ORAL at 22:09

## 2022-08-19 RX ADMIN — AMLODIPINE BESYLATE 10 MG: 5 TABLET ORAL at 22:08

## 2022-08-19 RX ADMIN — CEFDINIR 300 MG: 300 CAPSULE ORAL at 22:45

## 2022-08-19 RX ADMIN — INSULIN GLARGINE 30 UNITS: 100 INJECTION, SOLUTION SUBCUTANEOUS at 22:06

## 2022-08-19 RX ADMIN — LISINOPRIL 40 MG: 20 TABLET ORAL at 22:09

## 2022-08-19 RX ADMIN — DIAZEPAM 5 MG: 5 TABLET ORAL at 16:15

## 2022-08-19 RX ADMIN — ENOXAPARIN SODIUM 40 MG: 100 INJECTION SUBCUTANEOUS at 22:10

## 2022-08-19 RX ADMIN — Medication 10 ML: at 22:15

## 2022-08-19 RX ADMIN — MECLIZINE 50 MG: 12.5 TABLET ORAL at 11:04

## 2022-08-19 RX ADMIN — GABAPENTIN 300 MG: 300 CAPSULE ORAL at 22:09

## 2022-08-19 ASSESSMENT — ENCOUNTER SYMPTOMS
VOMITING: 0
COUGH: 0
COLOR CHANGE: 0
DIARRHEA: 0
BACK PAIN: 0
CONSTIPATION: 0
ABDOMINAL PAIN: 0
NAUSEA: 0
SHORTNESS OF BREATH: 0

## 2022-08-19 ASSESSMENT — PAIN - FUNCTIONAL ASSESSMENT: PAIN_FUNCTIONAL_ASSESSMENT: NONE - DENIES PAIN

## 2022-08-19 NOTE — ED NOTES
Attempted to ambulate pt with walker, pt able to stand but unable to take any steps, she stated she felt unsteady on her feet and states she has neuropathy in both her feet. Provider  Made aware.       Ritesh Toro RN  08/19/22 8325

## 2022-08-19 NOTE — H&P
HOSPITALISTS HISTORY AND PHYSICAL    8/19/2022 7:07 PM    Patient Information:  Sowmya Bradford is a 65 y.o. female [id number]  PCP:  STACEY Cortez CNP (Tel: [phone] )    Chief complaint:    Chief Complaint   Patient presents with    Dizziness     Pt in via [facility] from home, pt complains of dizziness that started 3 weeks ago, it comes and goes, she says when she moves her head from side to side it happens. No other complaints at this time. History of Present Illness:  Noelle Rosalesper Saturday is a 65 y.o. 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 She was recently admitted to [facility] on 7/16/2022 and treated for TIA and UTI and discharged on 7/28/2022 to [facility]. She had presented at that time with generalized weakness and inability to hold her head up. Work-up done at that time including MRI brain showed no acute infarction but multiple remote infarcts diffuse cerebral atrophy, micro hemorrhages likely associated with chronic hypertension. Patient unable to fully describe her dizziness; denies any vertiginous or lightheadedness, symptoms worse with head movement with difficulty ambulating, denied any headaches, focal weakness, visual disturbances, loss of consciousness, hearing impairment/aural fullness. She had requested to be discharged home from rehab since she could not fully participate and dizziness was not being addressed. History obtained from patient. REVIEW OF SYSTEMS:   Constitutional: Negative for fever,chills or night sweats  ENT: Negative for rhinorrhea, epistaxis, hoarseness, sore throat.   Respiratory: Negative for shortness of breath,wheezing  Cardiovascular: Negative for chest pain, palpitations   Gastrointestinal: Negative for nausea, vomiting, diarrhea  Genitourinary: Negative for polyuria, dysuria   Hematologic/Lymphatic: Negative for bleeding tendency, easy bruising  Musculoskeletal: Negative for myalgias and arthralgias  Neurologic: Negative for confusion,dysarthria. Skin: Negative for itching,rash  Psychiatric: Negative for depression,anxiety, agitation. Endocrine: Negative for polydipsia,polyuria,heat /cold intolerance. Past Medical History:   has a past medical history of Hypertension and Stroke (cerebrum) ([facility], [place] [address] Past Surgical History:   has a past surgical history that includes Cholecystectomy. Medications:  No current facility-administered medications on file prior to encounter. Current Outpatient Medications on File Prior to Encounter   Medication Sig Dispense Refill    furosemide (LASIX) 20 MG tablet Take 1 tablet by mouth 2 times daily 60 tablet 0    potassium chloride (KLOR-CON M) 10 MEQ extended release tablet Take 1 tablet by mouth 2 times daily 180 tablet 1    ondansetron (ZOFRAN ODT) 4 MG disintegrating tablet Take 1 tablet by mouth every 8 hours as needed for Nausea 20 tablet 0    atorvastatin (LIPITOR) 40 MG tablet Take 1 tablet by mouth nightly 30 tablet 3    clopidogrel (PLAVIX) 75 MG tablet Take 1 tablet by mouth daily 30 tablet 3    lisinopril (PRINIVIL;ZESTRIL) 40 MG tablet TAKE 1 TABLET BY MOUTH EVERY DAY  5    busPIRone (BUSPAR) 7.5 MG tablet TAKE 1 TABLET BY MOUTH TWICE A DAY  5    metFORMIN (GLUCOPHAGE) 500 MG tablet Take 500 mg by mouth 2 times daily (with meals)       insulin glargine (LANTUS) 100 UNIT/ML injection pen Inject 42 Units into the skin nightly       gabapentin (NEURONTIN) 300 MG capsule Take 300 mg by mouth 2 times daily. amLODIPine (NORVASC) 10 MG tablet Take 10 mg by mouth daily          Allergies:  No Known Allergies     Social History:  Patient Lives at home with son   reports that she has never smoked.  She has never used smokeless tobacco. She reports that she does not drink alcohol and does not use drugs. Family History:  family history is not on file. No known family history    Physical Exam:  BP (!) 147/88   Pulse 74   Temp 97.7 °F (36.5 °C) (Oral)   Resp 18   Wt 156 lb (70.8 kg)   SpO2 97%   BMI 25.96 kg/m²     General appearance:  Appears comfortable. Well nourished  Eyes: Sclera clear, pupils equal  ENT: Moist mucus membranes, no thrush. Trachea midline. Cardiovascular: Regular rhythm, normal S1, S2. No murmur, gallop, rub. No edema in lower extremities  Respiratory: Clear to auscultation bilaterally, no wheeze, good inspiratory effort  Gastrointestinal: Abdomen soft, non-tender, not distended, normal bowel sounds  Musculoskeletal: No cyanosis in digits, neck supple  Neurology: Cranial nerves grossly intact. Alert and oriented in time, place and person. No speech or motor deficits. Gait not assessed  Psychiatry: Appropriate affect. Not agitated  Skin: Warm, dry, normal turgor, no rash  Brisk capillary refill, peripheral pulses palpable   Labs:  CBC:   Lab Results   Component Value Date/Time    WBC 6.8 08/19/2022 11:24 AM    RBC 4.57 08/19/2022 11:24 AM    HGB 11.6 08/19/2022 11:24 AM    HCT 36.5 08/19/2022 11:24 AM    MCV 79.9 08/19/2022 11:24 AM    MCH 25.5 08/19/2022 11:24 AM    MCHC 31.9 08/19/2022 11:24 AM    RDW 16.1 08/19/2022 11:24 AM     08/19/2022 11:24 AM    MPV 7.8 08/19/2022 11:24 AM     BMP:    Lab Results   Component Value Date/Time     08/19/2022 11:24 AM    K 4.1 08/19/2022 11:24 AM    K 3.7 04/05/2022 03:18 PM     08/19/2022 11:24 AM    CO2 26 08/19/2022 11:24 AM    BUN 20 08/19/2022 11:24 AM    CREATININE 0.8 08/19/2022 11:24 AM    CALCIUM 9.6 08/19/2022 11:24 AM    GFRAA >60 08/19/2022 11:24 AM    LABGLOM >60 08/19/2022 11:24 AM    GLUCOSE 144 08/19/2022 11:24 AM     CT HEAD WO CONTRAST   Final Result   No acute intracranial abnormality.       Diffuse atrophic changes with findings suggesting chronic microvascular   ischemia         XR CHEST PORTABLE   Final Result   No significant findings in the chest.           Chest Xray: As above  EKG: Normal sinus RHYTHM with RBBB  I visualized CXR images and EKG strips. Problem List  Principal Problem:    Dizziness  Resolved Problems:    * No resolved hospital problems. *        Assessment/Plan:     Dizziness with gait instability:  Patient with history of CVA/TIAs. Recently worked up at [facility] with MRI head, CT head, CT angio head and neck. Repeat head CT without acute intracranial abnormality. Will request neurology input and hold off any further imaging at this time. Started on meclizine; doubt it will be beneficial given of vertigo. PT OT. Continue telemetry monitoring. On statin and Plavix. Hypertension: Monitor BP on home medications. T2DM on long-term insulin:  Monitor blood glucose on insulin. Follow-up A1c.    UTI: Continue cefdinir pending urine cultures. DVT prophylaxis: Lovenox  Code status: DNR CCA  Diet: Regular  IV access: Peripheral  Saldana Catheter: None    Admit as observation. I anticipate hospitalization spanning less than two midnights for investigation and treatment of the above medically necessary diagnoses. Please note that some part of this chart was generated using Dragon dictation software. Although every effort was made to ensure the accuracy of this automated transcription, some errors in transcription may have occurred inadvertently. If you may need any clarification, please do not hesitate to contact me through [facility].        Jean Rey MD    8/19/2022 7:07 PM

## 2022-08-19 NOTE — ED PROVIDER NOTES
In addition to the advanced practice provider, I personally saw [facility] Saturday and performed a substantive portion of the visit including all aspects of the medical decision making. Briefly, this is a 65 y.o. female here for dizziness. Patient states she feels unsteady on her feet. Symptoms ongoing off and on for the past 2 weeks, worse first thing on waking up in the morning. Patient was admitted to [facility] for similar symptoms on 7/16/2022, she had unremarkable CT and CTA imaging. MRI performed on 7/18/2022 without evidence of acute infarct. Patient states she was discharged from  back to her home setting without medications to help with her dizziness. On exam, patient afebrile and nontoxic. No distress. Heart RRR. Lungs CTAB. Abdomen soft, nondistended, nontender to palpation in all quadrants. Bilateral tympanic membranes intact, nonerythematous. No air-fluid level. Normal appearance of EACs. A&Ox4. Speech clear, face symmetric. PERRL, no nystagmus, normal test of skew. CN 2-12 intact. 5/5 motor and sensation grossly intact all extremities. No pronator drift. Normal finger to nose, normal heel to shin. EKG  EKG was reviewed by emergency department physician in the absence of a cardiologist    Wide complex sinus rhythm, rate 76, left axis deviation, normal NE and wide QRS intervals, normal Qtc, no ST elevations or depressions, TWI V2 and V3, impression sinus rhythm with RBBB and nonspecific T wave morphology, no STEMI, no significant change from comparison 4/5/2022      Screenings          Is this patient to be included in the SEP-1 Core Measure due to severe sepsis or septic shock? No   Exclusion criteria - the patient is NOT to be included for SEP-1 Core Measure due to:  2+ SIRS criteria are not met      MDM    Patient afebrile and nontoxic. No distress. No hypoglycemia. EKG no STEMI, troponin normal, no chest pain, very low suspicion for ACS. No malignant dysrhythmia.   CT head without evidence of acute hemorrhage or mass-effect. Neuro exam without focal deficit, given patient's dizziness posterior infarct was especially considered however patient without exam findings to suggest this diagnosis. Regardless symptoms present for the past 2 weeks and she is well outside the time window for consideration of thrombolytic therapy. Laboratory work-up overall reassuring. Patient does have urinalysis consistent with UTI, nothing to suggest pyelonephritis. She is not septic. Patient reported feeling improved on meclizine, however with attempt at ambulation with patient's walker (which she uses at baseline) she was unable to safely ambulate. Not felt safe for discharge to her home care setting at this current time. Case discussed with internal medicine team, will plan for admission. Patient alert, hemodynamically stable and in no distress at time of admission. I Dr. Niall Mayen am the primary clinician of record. Patient Referrals:  No follow-up provider specified. Discharge Medications:  Current Discharge Medication List          FINAL IMPRESSION  1. Dizziness    2. Acute cystitis without hematuria    3. Unable to ambulate        Blood pressure (!) 147/88, pulse 74, temperature 97.7 °F (36.5 °C), temperature source Oral, resp. rate 18, weight 156 lb (70.8 kg), SpO2 97 %. For further details of [facility] emergency department encounter, please see documentation by advanced practice provider, WILLIAM Ascencio.     Mariana Chatman DO (electronically signed)  Attending Emergency Physician       Mariana Chatman DO  08/19/22 [id number]

## 2022-08-19 NOTE — PROGRESS NOTES
4 Eyes Skin Assessment     NAME:  Nima Raman Saturday  YOB: 1945  MEDICAL RECORD NUMBER:  [id number]    The patient is being assess for  Admission    I agree that 2 RN's have performed a thorough Head to Toe Skin Assessment on the patient. ALL assessment sites listed below have been assessed. Areas assessed by both nurses:    Head, Face, Ears, Shoulders, Back, Chest, Arms, Elbows, Hands, Sacrum. Buttock, Coccyx, Ischium, and Legs. Feet and Heels        Does the Patient have a Wound?  No noted wound(s)       Donn Prevention initiated:  Yes   Wound Care Orders initiated:  NA    Pressure Injury (Stage 3,4, Unstageable, DTI, NWPT, and Complex wounds) if present place referral/consult order under [de-identified] No    New and Established Ostomies if present place consult order under : No      Nurse 1 eSignature: Electronically signed by Mary Vazquez RN on 8/19/22 at 6:54 PM EDT    **SHARE this note so that the co-signing nurse is able to place an eSignature**    Nurse 2 eSignature: Electronically signed by Patrick Wallace RN on 8/19/22 at 6:57 PM EDT

## 2022-08-19 NOTE — ED TRIAGE NOTES
Pt in via [address] [facility] from home, pt complains of dizziness that started 3 weeks ago, it comes and goes, she says when she moves her head from side to side it happens. No other complaints at this time.

## 2022-08-19 NOTE — ED PROVIDER NOTES
[address]        Pt Name: Katy Neff Saturday  MRN: [id number]  Melvagfmeng 1945  Date of evaluation: 8/19/2022  Provider: Giuseppe Knowles PA-C  PCP: STACEY Schwab CNP  Note Started: 10:31 AM EDT        I have seen and evaluated this patient with my supervising physician Stacey Bender, [address] [place] [address]       Chief Complaint   Patient presents with    Dizziness     Pt in via [address] [facility] from home, pt complains of dizziness that started 3 weeks ago, it comes and goes, she says when she moves her head from side to side it happens. No other complaints at this time. HISTORY OF PRESENT ILLNESS   (Location, Timing/Onset, Context/Setting, Quality, Duration, Modifying Factors, Severity, Associated Signs and Symptoms)  Note limiting factors. Chief Complaint: Dizziness    Naomie Saturday is a 65 y.o. female who presents to the emergency department complaining of dizziness and headache on and off for 1 month. Patient was seen at [facility] and admitted. She had a CT scan of the head, CTA head and neck and MRI. There was no acute infarction noted. Patient had numerous remote microhemorrhages associated with chronic hypertension. The patient states that she was transferred to [facility] for physical therapy. She states that she has chronic diabetic neuropathy which does affect her walking as well. She feels as though her dizziness was not fully addressed or managed well at the physical therapy therefore requested to go home. She has been home since Saturday. She states that she is still feeling persistently dizzy with head movement. When she keeps her head still she denies any dizziness. She does report a mild generalized headache. MRI head 7/18/22  IMPRESSION:     1. No acute infarction. 2.  Severe chronic small vessel disease.    3.  Innumerable remote microhemorrhages likely associated with chronic hypertension. 4.  Multiple remote infarcts. 5.  Mild diffuse cerebral atrophy. CT head 7/16/22  FINDINGS:     Adequate diagnostic quality. Brain parenchyma: Moderate nonspecific patchy and confluent white matter low-attenuation appears similar to prior study including asymmetric white matter disease in the right frontal periventricular region. Remote lacunar infarcts in the right corona radiata extending into the posterior limb of the right internal capsule are unchanged. Small remote insults in the right cerebellar hemisphere and right lexi are better appreciated on the prior MRI. Remote cortical infarcts left paracentral lobule is unchanged. No acute intracranial hemorrhage. No mass effect. Ventricles and extraaxial spaces: Unchanged appearance of the ventricles No extra-axial fluid collection. Orbits, paranasal sinuses, mastoids: No acute orbital abnormality. Clear paranasal sinuses. Clear mastoid air cells. Extracranial soft tissues: Cerumen in the bilateral external auditory canal.     Calvarium and skull base: No fracture or suspicious osseous lesion. Other: Atherosclerotic vascular calcifications    CTA head and neck 7/16/22  IMPRESSION:     Neck   1. No hemodynamically significant stenosis. Head   1. Multifocal intracranial stenosis most prominently involving the left A3 segment and right P1/P2 segments. 2.  No large vessel occlusion. Report Verified by: Trevor Alexandre MD at 7/16/2022 7:00 PM EDT    Nursing Notes were all reviewed and agreed with or any disagreements were addressed in the HPI. REVIEW OF SYSTEMS    (2-9 systems for level 4, 10 or more for level 5)     Review of Systems   Constitutional:  Negative for chills and fever. Eyes:  Negative for visual disturbance. Respiratory:  Negative for cough and shortness of breath. Cardiovascular:  Negative for chest pain.    Gastrointestinal:  Negative for abdominal pain, constipation, diarrhea, nausea and vomiting. Genitourinary: Negative. Musculoskeletal:  Negative for back pain, neck pain and neck stiffness. Skin:  Negative for color change, pallor, rash and wound. Neurological:  Positive for dizziness and headaches. Negative for tremors, seizures, syncope, facial asymmetry, speech difficulty, weakness, light-headedness and numbness. Psychiatric/Behavioral:  Negative for confusion. All other systems reviewed and are negative. Positives and Pertinent negatives as per HPI. Except as noted above in the ROS, all other systems were reviewed and negative. PAST MEDICAL HISTORY     Past Medical History:   Diagnosis Date    Hypertension     Stroke (cerebrum) ([facility] [address] 2001         SURGICAL HISTORY     Past Surgical History:   Procedure Laterality Date    CHOLECYSTECTOMY           CURRENTMEDICATIONS       Previous Medications    AMLODIPINE (NORVASC) 10 MG TABLET    Take 10 mg by mouth daily     ATORVASTATIN (LIPITOR) 40 MG TABLET    Take 1 tablet by mouth nightly    BUSPIRONE (BUSPAR) 7.5 MG TABLET    TAKE 1 TABLET BY MOUTH TWICE A DAY    CLOPIDOGREL (PLAVIX) 75 MG TABLET    Take 1 tablet by mouth daily    FUROSEMIDE (LASIX) 20 MG TABLET    Take 1 tablet by mouth 2 times daily    GABAPENTIN (NEURONTIN) 300 MG CAPSULE    Take 300 mg by mouth 2 times daily. INSULIN GLARGINE (LANTUS) 100 UNIT/ML INJECTION PEN    Inject 42 Units into the skin nightly     LISINOPRIL (PRINIVIL;ZESTRIL) 40 MG TABLET    TAKE 1 TABLET BY MOUTH EVERY DAY    METFORMIN (GLUCOPHAGE) 500 MG TABLET    Take 500 mg by mouth 2 times daily (with meals)     ONDANSETRON (ZOFRAN ODT) 4 MG DISINTEGRATING TABLET    Take 1 tablet by mouth every 8 hours as needed for Nausea    POTASSIUM CHLORIDE (KLOR-CON M) 10 MEQ EXTENDED RELEASE TABLET    Take 1 tablet by mouth 2 times daily         ALLERGIES     Patient has no known allergies. FAMILYHISTORY     History reviewed. No pertinent family history.        SOCIAL HISTORY Social History     Tobacco Use    Smoking status: Never    Smokeless tobacco: Never   Substance Use Topics    Alcohol use: No    Drug use: No       SCREENINGS             PHYSICAL EXAM    (up to 7 for level 4, 8 or more for level 5)     ED Triage Vitals [08/19/22 1024]   BP Temp Temp Source Heart Rate Resp SpO2 Height Weight   (!) 150/88 97.7 °F (36.5 °C) Oral 80 15 99 % -- 156 lb (70.8 kg)       Physical Exam  Vitals and nursing note reviewed. Constitutional:       Appearance: Normal appearance. She is well-developed. She is not toxic-appearing or diaphoretic. HENT:      Head: Normocephalic and atraumatic. Right Ear: External ear normal.      Left Ear: External ear normal.      Nose: Nose normal.      Mouth/Throat:      Mouth: Mucous membranes are moist.      Pharynx: Oropharynx is clear. Eyes:      General: No scleral icterus. Right eye: No discharge. Left eye: No discharge. Extraocular Movements: Extraocular movements intact. Conjunctiva/sclera: Conjunctivae normal.      Pupils: Pupils are equal, round, and reactive to light. Neck:      Trachea: Trachea and phonation normal.      Meningeal: Brudzinski's sign and Kernig's sign absent. Cardiovascular:      Rate and Rhythm: Normal rate. Pulmonary:      Effort: Pulmonary effort is normal.      Breath sounds: Normal breath sounds. Abdominal:      General: Bowel sounds are normal.      Palpations: Abdomen is soft. Tenderness: no abdominal tenderness   Musculoskeletal:         General: Normal range of motion. Cervical back: Full passive range of motion without pain, normal range of motion and neck supple. Skin:     General: Skin is warm and dry. Coloration: Skin is not pale. Neurological:      General: No focal deficit present. Mental Status: She is alert and oriented to person, place, and time. Cranial Nerves: No cranial nerve deficit (II-XII intact).       Comments: No pronator drift, facial the time of this note:    CT HEAD WO CONTRAST   Final Result   No acute intracranial abnormality.       Diffuse atrophic changes with findings suggesting chronic microvascular   ischemia         XR CHEST PORTABLE   Final Result   No significant findings in the chest.                 PROCEDURES   Unless otherwise noted below, none     Procedures    CRITICAL CARE TIME   N/a    CONSULTS:  IP CONSULT TO HOSPITALIST  IP CONSULT TO NEUROLOGY      EMERGENCY DEPARTMENT COURSE and DIFFERENTIAL DIAGNOSIS/MDM:   Vitals:    Vitals:    08/19/22 1024 08/19/22 1030 08/19/22 1100   BP: (!) 150/88 (!) 169/78 (!) 177/93   Pulse: 80 83 68   Resp: 15 16 (!) 8   Temp: 97.7 °F (36.5 °C)     TempSrc: Oral     SpO2: 99% 97% 96%   Weight: 156 lb (70.8 kg)         Patient was given the following medications:  Medications   diazePAM (VALIUM) tablet 5 mg (has no administration in time range)   sodium chloride flush 0.9 % injection 5-40 mL (has no administration in time range)   sodium chloride flush 0.9 % injection 5-40 mL (has no administration in time range)   0.9 % sodium chloride infusion (has no administration in time range)   enoxaparin (LOVENOX) injection 40 mg (has no administration in time range)   ondansetron (ZOFRAN-ODT) disintegrating tablet 4 mg (has no administration in time range)     Or   ondansetron (ZOFRAN) injection 4 mg (has no administration in time range)   polyethylene glycol (GLYCOLAX) packet 17 g (has no administration in time range)   acetaminophen (TYLENOL) tablet 650 mg (has no administration in time range)     Or   acetaminophen (TYLENOL) suppository 650 mg (has no administration in time range)   glucose-vitamin C chewable tablet 4 tablet (has no administration in time range)   dextrose bolus 10% 125 mL (has no administration in time range)     Or   dextrose bolus 10% 250 mL (has no administration in time range)   glucagon (rDNA) injection 1 mg (has no administration in time range)   dextrose 10 % infusion (has no administration in time range)   insulin lispro (HUMALOG) injection vial 0-4 Units (has no administration in time range)   insulin lispro (HUMALOG) injection vial 0-4 Units (has no administration in time range)   insulin glargine (LANTUS) injection vial 30 Units (has no administration in time range)   cefdinir (OMNICEF) capsule 300 mg (has no administration in time range)   meclizine (ANTIVERT) tablet 25 mg (has no administration in time range)   acetaminophen (TYLENOL) tablet 325 mg (325 mg Oral Given 8/19/22 1104)   meclizine (ANTIVERT) tablet 50 mg (50 mg Oral Given 8/19/22 1104)         Is this patient to be included in the SEP-1 Core Measure due to severe sepsis or septic shock? No   Exclusion criteria - the patient is NOT to be included for SEP-1 Core Measure due to:  SIRS criteria not met    This patient presents complaining of dizziness on and off for several weeks. She already had a recent evaluation including CT head, CTA head and neck and MRI of the brain. She was recently at her rehabilitation facility and went home. She has not been doing well since then. She still reports dizziness and unsteady gait. CT head here appear stable. She is a bit unsteady with ambulation and therefore unable to ambulate independently. She does not feel safe going home because of the unsteady gait due to dizziness. She does have acute cystitis. Denies any urinary symptoms. Abdomen soft and nontender. Antibiotic and Valium ordered. Patient understands and agrees with plan for admission. She may require placement for PT OT again. FINAL IMPRESSION      1. Dizziness    2. Acute cystitis without hematuria    3. Unable to ambulate          DISPOSITION/PLAN   DISPOSITION Admitted 08/19/2022 01:59:07 PM      PATIENT REFERRED TO:  No follow-up provider specified.     DISCHARGE MEDICATIONS:  New Prescriptions    No medications on file       DISCONTINUED MEDICATIONS:  Discontinued Medications    No medications on file (Please note that portions of this note were completed with a voice recognition program.  Efforts were made to edit the dictations but occasionally words are mis-transcribed.)    Stefani Vigil PA-C (electronically signed)           Stefani Vigil PA-C  08/19/22 3191

## 2022-08-19 NOTE — ACP (ADVANCE CARE PLANNING)
ADVANCED CARE PLANNING    Name:Naomie Saturday       :  1945              MRN:  [id number]      Purpose of Encounter: Advanced care planning. Parties in attendance: :Bcnohemy Medrano magy, Ximena Bradford MD.  Decisional Capacity:Yes    Diagnosis: Principal Problem:    Dizziness  Resolved Problems:    * No resolved hospital problems. *    Patients Medical Story: Uma Medrano Saturday is a 65 y.o. 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 Goals of Care Determinations: Patient wishes to focus on limited medical treatment  Plan: Will notify STACEY Lopez CNP of change in care plan. Will look at further interventions as needed. Christopher Craft (Sister) [phone]   Code Status: At this time patient wishes to be DNR-CCA  Time Spent with Patient: 16 minutes      Electronically signed by Ximena Bradford MD on 2022 at 7:16 PM  Thank you STAECY Lopez CNP for the opportunity to be involved in this patient's care.

## 2022-08-20 LAB
A/G RATIO: 0.8 (ref 1.1–2.2)
ALBUMIN SERPL-MCNC: 3.3 G/DL (ref 3.4–5)
ALP BLD-CCNC: 83 U/L (ref 40–129)
ALT SERPL-CCNC: 13 U/L (ref 10–40)
ANION GAP SERPL CALCULATED.3IONS-SCNC: 8 MMOL/L (ref 3–16)
AST SERPL-CCNC: 16 U/L (ref 15–37)
BASOPHILS ABSOLUTE: 0 K/UL (ref 0–0.2)
BASOPHILS RELATIVE PERCENT: 0.6 %
BILIRUB SERPL-MCNC: 0.4 MG/DL (ref 0–1)
BUN BLDV-MCNC: 18 MG/DL (ref 7–20)
CALCIUM SERPL-MCNC: 9.7 MG/DL (ref 8.3–10.6)
CHLORIDE BLD-SCNC: 106 MMOL/L (ref 99–110)
CO2: 27 MMOL/L (ref 21–32)
CREAT SERPL-MCNC: 0.8 MG/DL (ref 0.6–1.2)
EOSINOPHILS ABSOLUTE: 0.1 K/UL (ref 0–0.6)
EOSINOPHILS RELATIVE PERCENT: 1.6 %
ESTIMATED AVERAGE GLUCOSE: 171.4 MG/DL
FERRITIN: 110.8 NG/ML (ref 15–150)
FOLATE: 17.62 NG/ML (ref 4.78–24.2)
GFR AFRICAN AMERICAN: >60
GFR NON-AFRICAN AMERICAN: >60
GLUCOSE BLD-MCNC: 104 MG/DL (ref 70–99)
GLUCOSE BLD-MCNC: 106 MG/DL (ref 70–99)
GLUCOSE BLD-MCNC: 142 MG/DL (ref 70–99)
GLUCOSE BLD-MCNC: 193 MG/DL (ref 70–99)
GLUCOSE BLD-MCNC: 85 MG/DL (ref 70–99)
GLUCOSE BLD-MCNC: 86 MG/DL (ref 70–99)
GLUCOSE BLD-MCNC: 94 MG/DL (ref 70–99)
HBA1C MFR BLD: 7.6 %
HCT VFR BLD CALC: 37.6 % (ref 36–48)
HEMOGLOBIN: 11.9 G/DL (ref 12–16)
IRON SATURATION: 22 % (ref 15–50)
IRON: 45 UG/DL (ref 37–145)
LYMPHOCYTES ABSOLUTE: 1.9 K/UL (ref 1–5.1)
LYMPHOCYTES RELATIVE PERCENT: 36.7 %
MAGNESIUM: 2.1 MG/DL (ref 1.8–2.4)
MCH RBC QN AUTO: 25.6 PG (ref 26–34)
MCHC RBC AUTO-ENTMCNC: 31.5 G/DL (ref 31–36)
MCV RBC AUTO: 81.3 FL (ref 80–100)
MONOCYTES ABSOLUTE: 0.4 K/UL (ref 0–1.3)
MONOCYTES RELATIVE PERCENT: 8.6 %
NEUTROPHILS ABSOLUTE: 2.7 K/UL (ref 1.7–7.7)
NEUTROPHILS RELATIVE PERCENT: 52.5 %
PDW BLD-RTO: 16.1 % (ref 12.4–15.4)
PERFORMED ON: ABNORMAL
PERFORMED ON: NORMAL
PERFORMED ON: NORMAL
PHOSPHORUS: 3.5 MG/DL (ref 2.5–4.9)
PLATELET # BLD: 276 K/UL (ref 135–450)
PMV BLD AUTO: 7.7 FL (ref 5–10.5)
POTASSIUM SERPL-SCNC: 3.4 MMOL/L (ref 3.5–5.1)
RBC # BLD: 4.63 M/UL (ref 4–5.2)
SODIUM BLD-SCNC: 141 MMOL/L (ref 136–145)
TOTAL IRON BINDING CAPACITY: 206 UG/DL (ref 260–445)
TOTAL PROTEIN: 7.7 G/DL (ref 6.4–8.2)
VITAMIN B-12: 922 PG/ML (ref 211–911)
WBC # BLD: 5.1 K/UL (ref 4–11)

## 2022-08-20 PROCEDURE — 82728 ASSAY OF FERRITIN: CPT

## 2022-08-20 PROCEDURE — 82607 VITAMIN B-12: CPT

## 2022-08-20 PROCEDURE — 36415 COLL VENOUS BLD VENIPUNCTURE: CPT

## 2022-08-20 PROCEDURE — 97162 PT EVAL MOD COMPLEX 30 MIN: CPT

## 2022-08-20 PROCEDURE — 84100 ASSAY OF PHOSPHORUS: CPT

## 2022-08-20 PROCEDURE — G0378 HOSPITAL OBSERVATION PER HR: HCPCS

## 2022-08-20 PROCEDURE — 83735 ASSAY OF MAGNESIUM: CPT

## 2022-08-20 PROCEDURE — 99222 1ST HOSP IP/OBS MODERATE 55: CPT | Performed by: PSYCHIATRY & NEUROLOGY

## 2022-08-20 PROCEDURE — 80053 COMPREHEN METABOLIC PANEL: CPT

## 2022-08-20 PROCEDURE — 6360000002 HC RX W HCPCS: Performed by: INTERNAL MEDICINE

## 2022-08-20 PROCEDURE — 96360 HYDRATION IV INFUSION INIT: CPT

## 2022-08-20 PROCEDURE — 97530 THERAPEUTIC ACTIVITIES: CPT

## 2022-08-20 PROCEDURE — 82746 ASSAY OF FOLIC ACID SERUM: CPT

## 2022-08-20 PROCEDURE — 6370000000 HC RX 637 (ALT 250 FOR IP): Performed by: INTERNAL MEDICINE

## 2022-08-20 PROCEDURE — 2580000003 HC RX 258: Performed by: INTERNAL MEDICINE

## 2022-08-20 PROCEDURE — 83550 IRON BINDING TEST: CPT

## 2022-08-20 PROCEDURE — 85025 COMPLETE CBC W/AUTO DIFF WBC: CPT

## 2022-08-20 PROCEDURE — 83036 HEMOGLOBIN GLYCOSYLATED A1C: CPT

## 2022-08-20 PROCEDURE — 96372 THER/PROPH/DIAG INJ SC/IM: CPT

## 2022-08-20 PROCEDURE — 96361 HYDRATE IV INFUSION ADD-ON: CPT

## 2022-08-20 PROCEDURE — 97535 SELF CARE MNGMENT TRAINING: CPT

## 2022-08-20 PROCEDURE — 97166 OT EVAL MOD COMPLEX 45 MIN: CPT

## 2022-08-20 PROCEDURE — 83540 ASSAY OF IRON: CPT

## 2022-08-20 RX ORDER — LISINOPRIL 10 MG/1
10 TABLET ORAL DAILY
Status: DISCONTINUED | OUTPATIENT
Start: 2022-08-21 | End: 2022-08-25 | Stop reason: HOSPADM

## 2022-08-20 RX ORDER — SODIUM CHLORIDE 9 MG/ML
INJECTION, SOLUTION INTRAVENOUS CONTINUOUS
Status: DISCONTINUED | OUTPATIENT
Start: 2022-08-20 | End: 2022-08-21

## 2022-08-20 RX ADMIN — ENOXAPARIN SODIUM 40 MG: 100 INJECTION SUBCUTANEOUS at 21:58

## 2022-08-20 RX ADMIN — MECLIZINE 25 MG: 12.5 TABLET ORAL at 14:06

## 2022-08-20 RX ADMIN — LISINOPRIL 40 MG: 20 TABLET ORAL at 09:31

## 2022-08-20 RX ADMIN — INSULIN GLARGINE 30 UNITS: 100 INJECTION, SOLUTION SUBCUTANEOUS at 22:01

## 2022-08-20 RX ADMIN — CEFDINIR 300 MG: 300 CAPSULE ORAL at 09:31

## 2022-08-20 RX ADMIN — MECLIZINE 25 MG: 12.5 TABLET ORAL at 21:54

## 2022-08-20 RX ADMIN — Medication 10 ML: at 22:05

## 2022-08-20 RX ADMIN — BUSPIRONE HYDROCHLORIDE 7.5 MG: 5 TABLET ORAL at 21:53

## 2022-08-20 RX ADMIN — Medication 10 ML: at 09:32

## 2022-08-20 RX ADMIN — CEFDINIR 300 MG: 300 CAPSULE ORAL at 22:01

## 2022-08-20 RX ADMIN — GABAPENTIN 300 MG: 300 CAPSULE ORAL at 09:31

## 2022-08-20 RX ADMIN — GABAPENTIN 300 MG: 300 CAPSULE ORAL at 21:54

## 2022-08-20 RX ADMIN — FUROSEMIDE 20 MG: 20 TABLET ORAL at 09:31

## 2022-08-20 RX ADMIN — SODIUM CHLORIDE: 9 INJECTION, SOLUTION INTRAVENOUS at 15:29

## 2022-08-20 RX ADMIN — FUROSEMIDE 20 MG: 20 TABLET ORAL at 17:47

## 2022-08-20 RX ADMIN — CLOPIDOGREL BISULFATE 75 MG: 75 TABLET ORAL at 09:31

## 2022-08-20 RX ADMIN — MECLIZINE 25 MG: 12.5 TABLET ORAL at 09:31

## 2022-08-20 RX ADMIN — BUSPIRONE HYDROCHLORIDE 7.5 MG: 5 TABLET ORAL at 09:31

## 2022-08-20 RX ADMIN — AMLODIPINE BESYLATE 10 MG: 5 TABLET ORAL at 09:31

## 2022-08-20 RX ADMIN — ATORVASTATIN CALCIUM 40 MG: 40 TABLET, FILM COATED ORAL at 21:53

## 2022-08-20 NOTE — CONSULTS
In patient Neurology consult        [facility] Neurology      Erick Savage MD      [place] Saturday 1945    Date of Service: 8/20/2022    Referring Physician: Fernandez Shah MD      Reason for the consult and CC: Recurrent dizziness and vertigo. HPI:   The patient is a 65y.o.  years old female with history of hypertension, diabetes, neuropathy and prior CVA who was admitted to the hospital yesterday with above concern. Symptoms started few weeks ago. Duration is minutes and degree is moderate. Frequency is daily. Description feeling lightheaded and dizzy and ataxic but no spinning sensation are triggered by sudden movement. No recent fall or injury headache or chest pain. No LOC. She was admitted to [facility] last month with similar presentation. She had complete work-up with MRI which was unremarkable. Today she denies other new symptoms. No recent change in medicine. Blood pressure in the 665 systolic. [place] She is on Plavix.   Other review of system was unremarkable    Family history: Reviewed with the patient, noncontributory      Past Surgical History:   Procedure Laterality Date    CHOLECYSTECTOMY          Past Medical History:   Diagnosis Date    Hypertension     Stroke (cerebrum) ([facility] [address] 2001     Social History     Tobacco Use    Smoking status: Never    Smokeless tobacco: Never   Substance Use Topics    Alcohol use: No    Drug use: No     No Known Allergies  Current Facility-Administered Medications   Medication Dose Route Frequency Provider Last Rate Last Admin    sodium chloride flush 0.9 % injection 5-40 mL  5-40 mL IntraVENous 2 times per day Fernandez Shah MD   10 mL at 08/20/22 0932    sodium chloride flush 0.9 % injection 5-40 mL  5-40 mL IntraVENous PRN Fernandez Shah MD        0.9 % sodium chloride infusion   IntraVENous PRN Fernandez Shah MD        enoxaparin (LOVENOX) injection 40 mg  40 mg SubCUTAneous Nightly Fernandez Shah MD   40 mg at 08/19/22 6238 ondansetron (ZOFRAN-ODT) disintegrating tablet 4 mg  4 mg Oral Q8H PRN César House MD        Or    [facility] injection 4 mg  4 mg IntraVENous Q6H PRN César House MD        polyethylene glycol (GLYCOLAX) packet 17 g  17 g Oral Daily PRN César House MD        acetaminophen (TYLENOL) tablet 650 mg  650 mg Oral Q6H PRN César House MD        Or    acetaminophen (TYLENOL) suppository 650 mg  650 mg Rectal Q6H PRN César House, MD        dextrose bolus 10% 125 mL  125 mL IntraVENous PRN César House MD        Or    dextrose bolus 10% 250 mL  250 mL IntraVENous PRN César MD Harsh        glucagon (rDNA) injection 1 mg  1 mg SubCUTAneous PRN César House MD        dextrose 10 % infusion   IntraVENous Continuous PRN César House MD        insulin lispro (HUMALOG) injection vial 0-4 Units  0-4 Units SubCUTAneous TID WC César House MD        insulin lispro (HUMALOG) injection vial 0-4 Units  0-4 Units SubCUTAneous Nightly César House MD        insulin glargine (LANTUS) injection vial 30 Units  30 Units SubCUTAneous Nightly César House MD   30 Units at 08/19/22 2206    cefdinir (OMNICEF) capsule 300 mg  300 mg Oral 2 times per day César House MD   300 mg at 08/20/22 0931    amLODIPine (NORVASC) tablet 10 mg  10 mg Oral Daily César House MD   10 mg at 08/20/22 0931    atorvastatin (LIPITOR) tablet 40 mg  40 mg Oral Nightly César House MD   40 mg at 08/19/22 2209    busPIRone (BUSPAR) tablet 7.5 mg  7.5 mg Oral BID César House MD   7.5 mg at 08/20/22 0931    clopidogrel (PLAVIX) tablet 75 mg  75 mg Oral Daily César House MD   75 mg at 08/20/22 0931    furosemide (LASIX) tablet 20 mg  20 mg Oral BID César House MD   20 mg at 08/20/22 0931    gabapentin (NEURONTIN) capsule 300 mg  300 mg Oral BID César House MD   300 mg at 08/20/22 0931    lisinopril (PRINIVIL;ZESTRIL) tablet 40 mg  40 mg Oral Daily César House MD   40 mg at 08/20/22 3707 meclizine (ANTIVERT) tablet 25 mg  25 mg Oral TID Primus Goltz, MD   25 mg at 08/20/22 0931       ROS : A 10-14 system review of constitutional, cardiovascular, respiratory, eyes, musculoskeletal, endocrine, GI, ENT, skin, hematological, genitourinary, psychiatric and neurologic systems was obtained and updated today and is unremarkable except as mentioned in my HPI      Exam:     Constitutional:   Vitals:    08/19/22 2317 08/20/22 0458 08/20/22 0915 08/20/22 1229   BP: (!) 145/69 (!) 151/84 138/81 (!) 104/55   Pulse: 75 73 75 79   Resp: 18 16 16 18   Temp: 98.4 °F (36.9 °C) 97.7 °F (36.5 °C) 97.9 °F (36.6 °C) 98.5 °F (36.9 °C)   TempSrc: Oral Oral Oral Oral   SpO2: 97% 96%  99%   Weight:  173 lb 8 oz (78.7 kg)     Height:           General appearance and observation: Normal development and appear in no acute distress. Eye:  Fundus: No blurring of optic disc. Neck: supple  Cardiovascular: No lower leg edema with good pulsation. Mental Status:   Oriented to person, place, problem, and time. Memory: Good immediate recall. Intact remote memory  Normal attention span and concentration. Language: intact naming, repeating and fluency   Good fund of Knowledge. Aware of current events and vocabulary   Cranial Nerves:   II: Visual fields: Full. Pupils: equal, round, reactive to light  III,IV,VI: Extra Ocular Movements are intact. No nystagmus  V: Facial sensation is intact  VII: Facial strength and movements: intact and symmetric  VIII: Hearing: Intact  IX: Palate elevation is symmetric  XI: Shoulder shrug is intact  XII: Tongue movements are normal  Musculoskeletal: 5/5 in all 4 extremities. Tone: Normal tone. Reflexes: Symmetric 2+ in the arms and 1 in the legs   Planters: flexor bilaterally. Coordination: no pronator drift, no dysmetria with FNF in upper extremities. Normal REM.    Sensation: normal to all modalities in both arms and diminished in her distal legs and feet to vibration and touch.  Gait/Posture: steady gait     Data:  LABS:   Lab Results   Component Value Date/Time     08/20/2022 08:03 AM    K 3.4 08/20/2022 08:03 AM    K 3.7 04/05/2022 03:18 PM     08/20/2022 08:03 AM    CO2 27 08/20/2022 08:03 AM    BUN 18 08/20/2022 08:03 AM    CREATININE 0.8 08/20/2022 08:03 AM    GFRAA >60 08/20/2022 08:03 AM    LABGLOM >60 08/20/2022 08:03 AM    GLUCOSE 85 08/20/2022 08:03 AM    PHOS 3.5 08/20/2022 08:03 AM    MG 2.10 08/20/2022 08:03 AM    CALCIUM 9.7 08/20/2022 08:03 AM     Lab Results   Component Value Date/Time    WBC 5.1 08/20/2022 08:03 AM    RBC 4.63 08/20/2022 08:03 AM    HGB 11.9 08/20/2022 08:03 AM    HCT 37.6 08/20/2022 08:03 AM    MCV 81.3 08/20/2022 08:03 AM    RDW 16.1 08/20/2022 08:03 AM     08/20/2022 08:03 AM     Lab Results   Component Value Date    INR 0.97 06/17/2020    PROTIME 11.2 06/17/2020       Neuroimaging was independently reviewed by myself and discussed results with the patient and/or family  Reviewed notes from different physicians  Reviewed lab and blood testing    Impression:  Acute on chronic dizziness, likely peripheral vertigo. Exam today is nonfocal.  Recent imaging and stroke work-up from last month at outside hospital reviewed. No need to repeat neuroimaging at this point. Possible differential include chronic vestibular disease, diabetic polyneuropathy, low blood pressure or peripheral vertigo. Diabetes with polyneuropathy, not controlled  Hypertension with hypotension  Hyperlipidemia  Recent left hemispheric CVA    Recommendation:  Check orthostatics  PT and OT  Hydration  Continue Plavix and statin  Insulin sliding scale  Blood sugar control  Avoid low blood pressure and adjust blood pressure medication accordingly  Meclizine only as needed  Hydration  Add B12 and TFT  Stroke prevention and education provided with the patient. Discussed risk of falling.    Can be discharged from neurology once medically stable  Nothing to add from inpatient neurology at this point  We will follow from periphery. Thank you for referring such patient. If you have any questions regarding my consult note, please don't hesitate to call me. Shiraz Sam MD  [phone]    This dictation was generated by voice recognition computer software.  Although all attempts are made to edit the dictation for accuracy, there may be errors in the  transcription that are not intended

## 2022-08-20 NOTE — PROGRESS NOTES
[facility],[address] Msb Department   Phone: [phone]    Occupational Therapy    [x] Initial Evaluation            [] Daily Treatment Note         [] Discharge Summary      Patient: Teresa Palacios Saturday   : 1945   MRN: [id number]   Date of Service:  2022    Admitting Diagnosis:  Dizziness  1. Dizziness    2. Acute cystitis without hematuria    3. Unable to ambulate      Current Admission Summary:  Teresa Palacios Saturday is a 65 y.o. 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 She was recently admitted to [facility] on 2022 and treated for TIA and UTI and discharged on 2022 to [facility]. She had presented at that time with generalized weakness and inability to hold her head up. Work-up done at that time including MRI brain showed no acute infarction but multiple remote infarcts diffuse cerebral atrophy, micro hemorrhages likely associated with chronic hypertension. Past Medical History:  has a past medical history of Hypertension and Stroke (cerebrum) ([facility] [address] Past Surgical History:  has a past surgical history that includes Cholecystectomy. Discharge Recommendations: Teresa  scored a 18/24 on the AM-PAC ADL Inpatient form. Current research shows that an AM-PAC score of 17 or less is typically not associated with a discharge to the patient's home setting. Based on the patient's AM-PAC score and their current ADL deficits, it is recommended that the patient have 3-5 sessions per week of Occupational Therapy at d/c to increase the patient's independence. Please see assessment section for further patient specific details. Patient reports she will not have assist during the day because her son and her grandsons are out of the house.   Patient feels like in order to be safe she should return to skilled in patient rehabilitation for safety. If patient discharges prior to next session this note will serve as a discharge summary. Please see below for the latest assessment towards goals. DME Required For Discharge: standard walker  Patient demonstrated improved balance and safety with use of sw due to impaired lower body strength     Precautions/Restrictions: high fall risk  Weight Bearing Restrictions: weight bearing as tolerated  [] Right Upper Extremity  [] Left Upper Extremity [] Right Lower Extremity  [] Left Lower Extremity     Required Braces/Orthotics: no braces required   [] Right  [] Left  Positional Restrictions:no positional restrictions      Pre-Admission Information Lives With: Family (2 sons and grand son   Type of Home: House  Home Layout: One level, Laundry in basement  Home Access:  ramp to enter  Entrance Stairs - 0  Bathroom Shower/Tub: Walk-in shower, Shower chair without back patient reports she washes a sponge bath, was at [facility] 3 weeks, patient has been home from [employer] one week. Bathroom Toilet: Standard  Bathroom Accessibility: Not accessible  Home Equipment: rollater, patient reports she was mostly rolling it with her feet.    Receives Help From: Family assisted with self care, she reports she was able to scoot around on rollater and complete dishes   ADL Assistance:  family reports she can complete a bath , used rollator and was able to get into bathroom and complete toile ting and clothing management   Homemaking Assistance: Needs assistance (son helps with laundry, cooking, cleaning)  Ambulation Assistance: Independent (uses RW as needed)Rolator   Transfer Assistance: Independent  Active : No (pt reports no driving in the last year)  Additional Comments: pt reports no falls in the last 6 months          Examination     Vision:   Vision Gross Assessment: WFL  Hearing:   WFL  Perception:   Overall Perceptual Status: Impaired Bilaterally due to neuropathy  Observation:   Edema: Edema located in (B) LE. Edema Level: 1+: trace with minimal depression  Posture:   Slight flexion during ambulation   Sensation:   reports numbness and tingling in all extremities and reduced stereognosis to all extremities  Proprioception:    diminished proprioception in all extremities  Tone:   Normotonic  Coordination Testing:   WFL    ROM:   (B) UE AROM WFL  Strength:   (B) UE strength grossly WFL    Decision Making: medium complexity  Clinical Presentation: stable      Subjective    General: Patient reports she feels like she is dizzy just sitting in chair  Pain: 0/10  Pain Interventions: not applicable           Activities of Daily Living    Basic Activities of Daily Living  Grooming: contact guard assistance  Lower Extremity Dressing: minimal assistance  Toileting: contact guard assistance. Instrumental Activities of Daily Living  No IADL completed on this date. Functional Mobility    Bed Mobility  Bed mobility not completed on this date. Comments:  Transfers  Sit to stand transfer:contact guard assistance  Stand to sit transfer: contact guard assistance  Toilet transfer: contact guard assistance  Comments:use of grab bar for balance  Functional Mobility:  Standing Balance: contact guard assistance.     Functional Mobility Activity: to/from bathroom with contact guard and use of sw    Other Therapeutic Interventions      Functional Outcomes         Cognition  WFL  Orientation:    alert and oriented x 4  Command Following:   [facility]     Education    Barriers To Learning: none  Patient Education: patient educated on goals, OT role and benefits, plan of care, discharge recommendations  Learning Assessment:  patient verbalizes and demonstrates understanding    Assessment    Activity Tolerance: good  Impairments Requiring Therapeutic Intervention: decreased functional mobility, decreased ADL status, decreased endurance, decreased sensation, decreased balance, decreased IADL  Prognosis: good  Clinical Assessment:  Patient demonstrates impaired balance and activity tolerance in order to complete self care and IADL independently   Safety Interventions: patient left in chair, chair alarm in place, call light within reach, patient at risk for falls, and nurse notified       Plan    Frequency: 3-5 x/per week  Current Treatment Recommendations: balance training, functional mobility training, transfer training, neuromuscular re-education, ADL/self-care training, IADL training, and home management training    Goals    Patient Goals: Patient goal is to return home after short rehab stay     Short Term Goals:  Time Frame: discharge  Patient will complete upper body ADL at stand by assistance   Patient will complete lower body ADL at stand by assistance   Patient will complete toileting at stand by assistance   Patient will complete self-feeding at modified independent   Patient will complete grooming at modified independent   Patient will complete functional transfers at stand by assistance   Patient will complete functional mobility at stand by assistance   Patient will complete bed mobility at stand by assistance   Patient to gather and transport IADL items at stand by assistance        Therapy Session Time     Individual Group Co-treatment   Time In  11:12      Time Out  12:05       Minutes  53           Timed Code Treatment Minutes:   43    Total Treatment Minutes:  53    Electronically Signed By: Stephan Mott OT

## 2022-08-20 NOTE — PROGRESS NOTES
[facility] Department   Phone: [phone]    Physical Therapy    [x] Initial Evaluation            [] Daily Treatment Note         [] Discharge Summary      Patient: Anne Gibbs Saturday   : 1945   MRN: [id number]   Date of Service:  2022  Admitting Diagnosis: Dizziness  Current Admission Summary: Anne Gibbs magy is a 65 y.o. female who presents to the emergency department complaining of dizziness and headache on and off for 1 month. Patient was seen at [facility] and admitted. She had a CT scan of the head, CTA head and neck and MRI. There was no acute infarction noted. Patient had numerous remote microhemorrhages associated with chronic hypertension. The patient states that she was transferred to [facility] for physical therapy. She states that she has chronic diabetic neuropathy which does affect her walking as well. She feels as though her dizziness was not fully addressed or managed well at the physical therapy therefore requested to go home. She has been home since Saturday. She states that she is still feeling persistently dizzy with head movement. When she keeps her head still she denies any dizziness. She does report a mild generalized headache. Past Medical History:  has a past medical history of Hypertension and Stroke (cerebrum) ([address] Past Surgical History:  has a past surgical history that includes Cholecystectomy. Discharge Recommendations: Anne Gibbs Saturday scored a 17/24 on the AM-PAC short mobility form. Current research shows that an AM-PAC score of 17 or less is typically not associated with a discharge to the patient's home setting. Based on the patient's AM-PAC score and their current functional mobility deficits, it is recommended that the patient have 3-5 sessions per week of Physical Therapy at d/c to increase the patient's independence. Please see assessment section for further patient specific details. Kavita Ni If patient discharges prior to next session this note will serve as a discharge summary. Please see below for the latest assessment towards goals. DME Required For Discharge: patient has all required DME for discharge  Precautions/Restrictions: high fall risk  Weight Bearing Restrictions: no restrictions  [] Right Upper Extremity  [] Left Upper Extremity [] Right Lower Extremity  [] Left Lower Extremity     Required Braces/Orthotics: no braces required   [] Right  [] Left  Positional Restrictions:no positional restrictions    Pre-Admission Information   Lives With: son, grandchildren    Type of Home: house  Home Layout: one level, laundry in basement  Home Access: ramped entry  Bathroom Layout: walker accessible, walk in shower  Bathroom Equipment: . Comment: none pt does sponge baths  Toilet Height: standard height  Home Equipment: rolling walker, rollator - 4 wheeled walker, manual wheelchair  Transfer Assistance: modified independent with use of 4ww  Ambulation Assistance:modified independent with use of 4ww  ADL Assistance: independent with all ADL's  IADL Assistance: requires assistance with laundry  Active :        [] Yes  [x] No  Hand Dominance: [x] Left  [] Right  Current Employment: retired. Occupation: worked for [employer]:   Recent Falls: Pt has had no falls in last 6 months per her report    Examination   Vision:   Vision Gross Assessment: WFL  Hearing:   WFL  Observation:   General Observation:  Pt supine in bed, purewick in place. Telemetry monitor  Posture:    Forward head, rounded shoulders, increased thoracic kyphosis  Sensation:   accurately detects gross touch to all extremities and reports numbness and tingling in (B) LE  Proprioception:    WFL  Tone:   Normotonic  Coordination Testing:   WFL    ROM:   (B) LE AROM WFL  Strength:   (B) LE strength grossly WFL  Decision Making: medium complexity  Clinical Presentation: evolving      Subjective  General: Pt supine in bed, reports she is tired but feeling better than yesterday. Reports she still has some dizziness when turning head but not as severe. Pain: 0/10  Pain Interventions: not applicable       Functional Mobility  Bed Mobility  Supine to Sit: supervision  Comments: goes into long sit and reports dizziness. Moves into short sitting on EOB with supv. Dizziness passes after a few minutes of sitting. Transfers  Sit to stand transfer: contact guard assistance, minimal assistance  Stand to sit transfer: contact guard assistance  Comments: min to stand from bed, CGA from chair with arm rests. Pt needs cues for UE positioning. Ambulation  Surface:level surface  Assistive Device: rolling walker  Assistance: contact guard assistance  Distance: 40 ft  Gait Mechanics: Pt with step to pattern, has issues initially with advancing right LE but states its because of her neuropathy and she feels she is walking with a ball under her feet. No dizziness with gait, as she walked further gait improved. Comments:    Stair Mobility  Stair mobility not completed on this date. Comments:  Wheelchair Mobility:  No w/c mobility completed on this date. Comments:  Balance  Static Sitting Balance: good: independent with functional balance in unsupported position  Dynamic Sitting Balance: good: independent with functional balance in unsupported position  Static Standing Balance: fair (-): maintains balance at SBA with use of UE support  Dynamic Standing Balance: fair (-): maintains balance at CGA with use of UE support  Comments:    Other Therapeutic Interventions  Pt performing smooth pursuits testing, noted some extraneous eye movements with tracking. Performed head thrust test, it was positive to right. Suspect a vestibular cause to her dizziness. Unable to perform farzad hallpike at this time due to hospital bed positioning limitations    Orthostatic BP taken with RN. Pt's supine BP was 138/81, sitting 159/79, and standing was 160/90. Functional Outcomes  -PAC Inpatient Mobility Raw Score : 17              Cognition  WFL  Orientation:    alert and oriented x 4  Command Following:   [facility]    Education  Barriers To Learning: none  Patient Education: patient educated on goals, PT role and benefits, plan of care, general safety, discharge recommendations  Learning Assessment:  patient verbalizes understanding, would benefit from continued reinforcement    Assessment  Activity Tolerance: fair, fatigues quickly  Impairments Requiring Therapeutic Intervention: decreased functional mobility, decreased ADL status, decreased safety awareness, decreased endurance, decreased balance, vestibular impairment, decreased posture  Prognosis: good  Clinical Assessment: Pt presents with deficits in her mobility that are below her baseline level. Pt reports she feels she is moving ok but her dizziness is her main limiting factor. Does not want to go back to SNF, wants to return to home and discussed OP PT with vestibular focus which pt is agreeable to. If pt has support at home would recommend home with OP PT, if she does not may require SNF. Pt was inconsistent in reports of assistance at home. Pt would benefit from continued skilled therapy to address her mobility deficits and facilitate return to home while admitted to [facility].    Safety Interventions: patient left in chair, chair alarm in place, call light within reach, gait belt, patient at risk for falls, and nurse notified    Plan  Frequency: 3-5 x/per week  Current Treatment Recommendations: strengthening, balance training, functional mobility training, transfer training, gait training, stair training, endurance training, neuromuscular re-education, home exercise program, safety education, positioning, and vestibular rehab    Goals  Patient Goals: to go home and get my head straight   Short Term Goals:  Time Frame: upon dc  Patient will complete bed mobility at Independent   Patient will complete transfers at modified independent   Patient will ambulate 100 ft with use of rollator (4WRW) at modified independent  Patient to maintain standing at [facility] for 5 minutes and be able to turn head without dizziness    Therapy Session Time      Individual Group Co-treatment   Time In 0903       Time Out 0941       Minutes 38         Timed Code Treatment Minutes:  23 Minutes  Total Treatment Minutes:  38       Electronically Signed By: Lorelei Cho, [address] [place] [address]

## 2022-08-20 NOTE — PLAN OF CARE
Problem: Safety - Adult  Goal: Free from fall injury  8/20/2022 0048 by Rachel Tilley RN  Outcome: Progressing

## 2022-08-20 NOTE — PROGRESS NOTES
Hospitalist Progress Note      PCP: STACEY Collins - CNP    Date of Admission: 8/19/2022    Chief Complaint: Dizziness    Hospital Course: Madison Sanchez Saturday is a 65 y.o. 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 She was recently admitted to [facility] on 7/16/2022 and treated for TIA and UTI and discharged on 7/28/2022 to [facility]. She had presented at that time with generalized weakness and inability to hold her head up. Work-up done at that time including MRI brain showed no acute infarction but multiple remote infarcts diffuse cerebral atrophy, micro hemorrhages likely associated with chronic hypertension. Patient unable to fully describe her dizziness; denies any vertiginous or lightheadedness, symptoms worse with head movement with difficulty ambulating, denied any headaches, focal weakness, visual disturbances, loss of consciousness, hearing impairment/aural fullness. She had requested to be discharged home from rehab since she could not fully participate and dizziness was not being addressed. Subjective: Patient seen and examined. No interval events. Still with dizziness and difficulty ambulating   No pain, fever or chills.           Medications:  Reviewed    Infusion Medications    sodium chloride      dextrose       Scheduled Medications    sodium chloride flush  5-40 mL IntraVENous 2 times per day    enoxaparin  40 mg SubCUTAneous Nightly    insulin lispro  0-4 Units SubCUTAneous TID     insulin lispro  0-4 Units SubCUTAneous Nightly    insulin glargine  30 Units SubCUTAneous Nightly    cefdinir  300 mg Oral 2 times per day    amLODIPine  10 mg Oral Daily    atorvastatin  40 mg Oral Nightly    busPIRone  7.5 mg Oral BID    clopidogrel  75 mg Oral Daily    furosemide  20 mg Oral BID    gabapentin  300 mg Oral BID lisinopril  40 mg Oral Daily    meclizine  25 mg Oral TID     PRN Meds: sodium chloride flush, sodium chloride, ondansetron **OR** ondansetron, polyethylene glycol, acetaminophen **OR** acetaminophen, dextrose bolus **OR** dextrose bolus, glucagon (rDNA), dextrose      Intake/Output Summary (Last 24 hours) at 8/20/2022 1419  Last data filed at 8/20/2022 1013  Gross per 24 hour   Intake 540 ml   Output --   Net 540 ml       Physical Exam Performed:    BP (!) 104/55   Pulse 79   Temp 98.5 °F (36.9 °C) (Oral)   Resp 18   Ht 5' 5\" (1.651 m)   Wt 173 lb 8 oz (78.7 kg)   SpO2 99%   BMI 28.87 kg/m²     General appearance: No apparent distress, appears stated age and cooperative. HEENT: Pupils equal, round, and reactive to light. Conjunctivae/corneas clear. Neck: Supple, with full range of motion. No jugular venous distention. Trachea midline. Respiratory:  Normal respiratory effort. Clear to auscultation, bilaterally without Rales/Wheezes/Rhonchi. Cardiovascular: Regular rate and rhythm with normal S1/S2 without murmurs, rubs or gallops. Abdomen: Soft, non-tender, non-distended with normal bowel sounds. Musculoskeletal: No clubbing, cyanosis or edema bilaterally. Full range of motion without deformity. Skin: Skin color, texture, turgor normal.  No rashes or lesions. Neurologic:  Neurovascularly intact without any focal sensory/motor deficits. Cranial nerves: II-XII intact, grossly non-focal. Gait not assessed.   Psychiatric: Alert and oriented, thought content appropriate, normal insight  Capillary Refill: Brisk,3 seconds, normal   Peripheral Pulses: +2 palpable, equal bilaterally       Labs:   Recent Labs     08/19/22  1124 08/20/22  0803   WBC 6.8 5.1   HGB 11.6* 11.9*   HCT 36.5 37.6    276     Recent Labs     08/19/22  1124 08/20/22  0803    141   K 4.1 3.4*    106   CO2 26 27   BUN 20 18   CREATININE 0.8 0.8   CALCIUM 9.6 9.7   PHOS  --  3.5     Recent Labs     08/19/22  1124 08/20/22  0803   AST 19 16   ALT 13 13   BILITOT 0.3 0.4   ALKPHOS 86 83     No results for input(s): INR in the last 72 hours. Recent Labs     08/19/22  1124   TROPONINI <0.01       Urinalysis:      Lab Results   Component Value Date/Time    NITRU POSITIVE 08/19/2022 11:24 AM    WBCUA 24 08/19/2022 11:24 AM    BACTERIA 4+ 08/19/2022 11:24 AM    RBCUA 3 08/19/2022 11:24 AM    BLOODU TRACE 08/19/2022 11:24 AM    SPECGRAV 1.010 08/19/2022 11:24 AM    GLUCOSEU Negative 08/19/2022 11:24 AM       Radiology:  CT HEAD WO CONTRAST   Final Result   No acute intracranial abnormality. Diffuse atrophic changes with findings suggesting chronic microvascular   ischemia         XR CHEST PORTABLE   Final Result   No significant findings in the chest.                 Assessment/Plan:    Active Hospital Problems    Diagnosis     Dizziness [R42]      Priority: Medium     Dizziness with gait instability:  Patient with history of CVA/TIAs. Recent left hemispheric CVA. Recently worked up at [facility] with MRI head, CT head, CT angio head and neck. Repeat head CT without acute intracranial abnormality. Neurology consult appreciated. No further work up recommended. No further imaging at this time. Continue Meclizine and vestibular vertigo. PT OT. Continue telemetry monitoring. On statin and Plavix. Follow up TSH, B 12. Hypertension:   Hypotensive today. Started on IV fluids. Monitor BP on home medications. Doses adjusted. T2DM on long-term insulin with Neuropathy:  Monitor blood glucose on insulin. Follow-up A1c.       UTI: Continue cefdinir pending urine cultures. DVT Prophylaxis: Lovenox  Diet: ADULT DIET; Regular; 4 carb choices (60 gm/meal);  Low Sodium (2 gm)  Code Status: DNR-CCA    PT/OT Eval Status: pending    Dispo - once medically stable    Gay Gomez MD

## 2022-08-21 LAB
A/G RATIO: 1 (ref 1.1–2.2)
ALBUMIN SERPL-MCNC: 3.8 G/DL (ref 3.4–5)
ALP BLD-CCNC: 87 U/L (ref 40–129)
ALT SERPL-CCNC: 13 U/L (ref 10–40)
ANION GAP SERPL CALCULATED.3IONS-SCNC: 13 MMOL/L (ref 3–16)
AST SERPL-CCNC: 18 U/L (ref 15–37)
BASOPHILS ABSOLUTE: 0 K/UL (ref 0–0.2)
BASOPHILS RELATIVE PERCENT: 0.6 %
BILIRUB SERPL-MCNC: 0.5 MG/DL (ref 0–1)
BUN BLDV-MCNC: 22 MG/DL (ref 7–20)
CALCIUM SERPL-MCNC: 9.3 MG/DL (ref 8.3–10.6)
CHLORIDE BLD-SCNC: 105 MMOL/L (ref 99–110)
CO2: 25 MMOL/L (ref 21–32)
CREAT SERPL-MCNC: 0.9 MG/DL (ref 0.6–1.2)
EKG ATRIAL RATE: 89 BPM
EKG DIAGNOSIS: NORMAL
EKG P AXIS: 45 DEGREES
EKG P-R INTERVAL: 182 MS
EKG Q-T INTERVAL: 436 MS
EKG QRS DURATION: 162 MS
EKG QTC CALCULATION (BAZETT): 499 MS
EKG R AXIS: -49 DEGREES
EKG T AXIS: 0 DEGREES
EKG VENTRICULAR RATE: 79 BPM
EOSINOPHILS ABSOLUTE: 0.2 K/UL (ref 0–0.6)
EOSINOPHILS RELATIVE PERCENT: 2.5 %
GFR AFRICAN AMERICAN: >60
GFR NON-AFRICAN AMERICAN: >60
GLUCOSE BLD-MCNC: 158 MG/DL (ref 70–99)
GLUCOSE BLD-MCNC: 202 MG/DL (ref 70–99)
GLUCOSE BLD-MCNC: 80 MG/DL (ref 70–99)
GLUCOSE BLD-MCNC: 90 MG/DL (ref 70–99)
GLUCOSE BLD-MCNC: 99 MG/DL (ref 70–99)
HCT VFR BLD CALC: 37.7 % (ref 36–48)
HEMOGLOBIN: 12.1 G/DL (ref 12–16)
LYMPHOCYTES ABSOLUTE: 2.8 K/UL (ref 1–5.1)
LYMPHOCYTES RELATIVE PERCENT: 44.6 %
MAGNESIUM: 1.8 MG/DL (ref 1.8–2.4)
MCH RBC QN AUTO: 25.9 PG (ref 26–34)
MCHC RBC AUTO-ENTMCNC: 32 G/DL (ref 31–36)
MCV RBC AUTO: 80.8 FL (ref 80–100)
MONOCYTES ABSOLUTE: 0.6 K/UL (ref 0–1.3)
MONOCYTES RELATIVE PERCENT: 9.8 %
NEUTROPHILS ABSOLUTE: 2.7 K/UL (ref 1.7–7.7)
NEUTROPHILS RELATIVE PERCENT: 42.5 %
ORGANISM: ABNORMAL
PDW BLD-RTO: 16 % (ref 12.4–15.4)
PERFORMED ON: ABNORMAL
PERFORMED ON: ABNORMAL
PERFORMED ON: NORMAL
PERFORMED ON: NORMAL
PHOSPHORUS: 3.8 MG/DL (ref 2.5–4.9)
PLATELET # BLD: 280 K/UL (ref 135–450)
PMV BLD AUTO: 7.6 FL (ref 5–10.5)
POTASSIUM SERPL-SCNC: 3.5 MMOL/L (ref 3.5–5.1)
RBC # BLD: 4.67 M/UL (ref 4–5.2)
SODIUM BLD-SCNC: 143 MMOL/L (ref 136–145)
TOTAL PROTEIN: 7.5 G/DL (ref 6.4–8.2)
URINE CULTURE, ROUTINE: ABNORMAL
WBC # BLD: 6.2 K/UL (ref 4–11)

## 2022-08-21 PROCEDURE — 36415 COLL VENOUS BLD VENIPUNCTURE: CPT

## 2022-08-21 PROCEDURE — G0378 HOSPITAL OBSERVATION PER HR: HCPCS

## 2022-08-21 PROCEDURE — 2580000003 HC RX 258: Performed by: INTERNAL MEDICINE

## 2022-08-21 PROCEDURE — 80053 COMPREHEN METABOLIC PANEL: CPT

## 2022-08-21 PROCEDURE — 6370000000 HC RX 637 (ALT 250 FOR IP): Performed by: INTERNAL MEDICINE

## 2022-08-21 PROCEDURE — 83735 ASSAY OF MAGNESIUM: CPT

## 2022-08-21 PROCEDURE — 1200000000 HC SEMI PRIVATE

## 2022-08-21 PROCEDURE — 96361 HYDRATE IV INFUSION ADD-ON: CPT

## 2022-08-21 PROCEDURE — 84100 ASSAY OF PHOSPHORUS: CPT

## 2022-08-21 PROCEDURE — 93010 ELECTROCARDIOGRAM REPORT: CPT | Performed by: INTERNAL MEDICINE

## 2022-08-21 PROCEDURE — 85025 COMPLETE CBC W/AUTO DIFF WBC: CPT

## 2022-08-21 PROCEDURE — 6360000002 HC RX W HCPCS: Performed by: INTERNAL MEDICINE

## 2022-08-21 RX ADMIN — GABAPENTIN 300 MG: 300 CAPSULE ORAL at 08:25

## 2022-08-21 RX ADMIN — FUROSEMIDE 20 MG: 20 TABLET ORAL at 17:46

## 2022-08-21 RX ADMIN — ATORVASTATIN CALCIUM 40 MG: 40 TABLET, FILM COATED ORAL at 20:24

## 2022-08-21 RX ADMIN — MECLIZINE 25 MG: 12.5 TABLET ORAL at 20:30

## 2022-08-21 RX ADMIN — CEFDINIR 300 MG: 300 CAPSULE ORAL at 20:30

## 2022-08-21 RX ADMIN — MECLIZINE 25 MG: 12.5 TABLET ORAL at 13:44

## 2022-08-21 RX ADMIN — FUROSEMIDE 20 MG: 20 TABLET ORAL at 08:25

## 2022-08-21 RX ADMIN — Medication 10 ML: at 20:30

## 2022-08-21 RX ADMIN — CLOPIDOGREL BISULFATE 75 MG: 75 TABLET ORAL at 08:26

## 2022-08-21 RX ADMIN — BUSPIRONE HYDROCHLORIDE 7.5 MG: 5 TABLET ORAL at 20:23

## 2022-08-21 RX ADMIN — CEFDINIR 300 MG: 300 CAPSULE ORAL at 08:25

## 2022-08-21 RX ADMIN — LISINOPRIL 10 MG: 10 TABLET ORAL at 08:26

## 2022-08-21 RX ADMIN — GABAPENTIN 300 MG: 300 CAPSULE ORAL at 20:24

## 2022-08-21 RX ADMIN — INSULIN GLARGINE 30 UNITS: 100 INJECTION, SOLUTION SUBCUTANEOUS at 20:25

## 2022-08-21 RX ADMIN — ENOXAPARIN SODIUM 40 MG: 100 INJECTION SUBCUTANEOUS at 20:24

## 2022-08-21 RX ADMIN — SODIUM CHLORIDE: 9 INJECTION, SOLUTION INTRAVENOUS at 00:23

## 2022-08-21 RX ADMIN — SODIUM CHLORIDE: 9 INJECTION, SOLUTION INTRAVENOUS at 13:44

## 2022-08-21 RX ADMIN — AMLODIPINE BESYLATE 10 MG: 5 TABLET ORAL at 08:25

## 2022-08-21 RX ADMIN — MECLIZINE 25 MG: 12.5 TABLET ORAL at 08:25

## 2022-08-21 RX ADMIN — BUSPIRONE HYDROCHLORIDE 7.5 MG: 5 TABLET ORAL at 08:25

## 2022-08-21 NOTE — PROGRESS NOTES
Pt was had one Xlarge wet diaper and was cleaned. and diaper was changed,,back to sleep,bed alarm set up.

## 2022-08-21 NOTE — PROGRESS NOTES
Hospitalist Progress Note      PCP: STACEY Akins CNP    Date of Admission: 8/19/2022    Chief Complaint: Dizziness    Hospital Course: Dasha Paulson Saturday is a 65 y.o. 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 She was recently admitted to [facility] on 7/16/2022 and treated for TIA and UTI and discharged on 7/28/2022 to [facility]. She had presented at that time with generalized weakness and inability to hold her head up. Work-up done at that time including MRI brain showed no acute infarction but multiple remote infarcts diffuse cerebral atrophy, micro hemorrhages likely associated with chronic hypertension. Patient unable to fully describe her dizziness; denies any vertiginous or lightheadedness, symptoms worse with head movement with difficulty ambulating, denied any headaches, focal weakness, visual disturbances, loss of consciousness, hearing impairment/aural fullness. She had requested to be discharged home from rehab since she could not fully participate and dizziness was not being addressed. Subjective: Patient seen and examined. No interval events. Still with dizziness and difficulty ambulating   No pain, fever or chills.           Medications:  Reviewed    Infusion Medications    sodium chloride 100 mL/hr at 08/21/22 1344    sodium chloride      dextrose       Scheduled Medications    lisinopril  10 mg Oral Daily    sodium chloride flush  5-40 mL IntraVENous 2 times per day    enoxaparin  40 mg SubCUTAneous Nightly    insulin lispro  0-4 Units SubCUTAneous TID     insulin lispro  0-4 Units SubCUTAneous Nightly    insulin glargine  30 Units SubCUTAneous Nightly    cefdinir  300 mg Oral 2 times per day    amLODIPine  10 mg Oral Daily    atorvastatin  40 mg Oral Nightly    busPIRone  7.5 mg Oral BID    clopidogrel Carlota Alfaro MD

## 2022-08-22 PROBLEM — E87.6 HYPOKALEMIA: Status: ACTIVE | Noted: 2022-08-22

## 2022-08-22 PROBLEM — N39.0 UTI (URINARY TRACT INFECTION): Status: ACTIVE | Noted: 2022-08-22

## 2022-08-22 PROBLEM — E83.42 HYPOMAGNESEMIA: Status: ACTIVE | Noted: 2022-08-22

## 2022-08-22 LAB
A/G RATIO: 0.8 (ref 1.1–2.2)
ALBUMIN SERPL-MCNC: 3.5 G/DL (ref 3.4–5)
ALP BLD-CCNC: 88 U/L (ref 40–129)
ALT SERPL-CCNC: 13 U/L (ref 10–40)
ANION GAP SERPL CALCULATED.3IONS-SCNC: 9 MMOL/L (ref 3–16)
AST SERPL-CCNC: 14 U/L (ref 15–37)
BASOPHILS ABSOLUTE: 0 K/UL (ref 0–0.2)
BASOPHILS RELATIVE PERCENT: 0.4 %
BILIRUB SERPL-MCNC: 0.4 MG/DL (ref 0–1)
BUN BLDV-MCNC: 18 MG/DL (ref 7–20)
CALCIUM SERPL-MCNC: 9.2 MG/DL (ref 8.3–10.6)
CHLORIDE BLD-SCNC: 106 MMOL/L (ref 99–110)
CO2: 29 MMOL/L (ref 21–32)
CREAT SERPL-MCNC: 0.8 MG/DL (ref 0.6–1.2)
EOSINOPHILS ABSOLUTE: 0.1 K/UL (ref 0–0.6)
EOSINOPHILS RELATIVE PERCENT: 1.4 %
GFR AFRICAN AMERICAN: >60
GFR NON-AFRICAN AMERICAN: >60
GLUCOSE BLD-MCNC: 125 MG/DL (ref 70–99)
GLUCOSE BLD-MCNC: 184 MG/DL (ref 70–99)
GLUCOSE BLD-MCNC: 187 MG/DL (ref 70–99)
GLUCOSE BLD-MCNC: 71 MG/DL (ref 70–99)
GLUCOSE BLD-MCNC: 86 MG/DL (ref 70–99)
HCT VFR BLD CALC: 37.3 % (ref 36–48)
HEMOGLOBIN: 11.9 G/DL (ref 12–16)
LYMPHOCYTES ABSOLUTE: 2.8 K/UL (ref 1–5.1)
LYMPHOCYTES RELATIVE PERCENT: 41.6 %
MAGNESIUM: 1.7 MG/DL (ref 1.8–2.4)
MCH RBC QN AUTO: 25.6 PG (ref 26–34)
MCHC RBC AUTO-ENTMCNC: 31.9 G/DL (ref 31–36)
MCV RBC AUTO: 80.3 FL (ref 80–100)
MONOCYTES ABSOLUTE: 0.6 K/UL (ref 0–1.3)
MONOCYTES RELATIVE PERCENT: 8.2 %
NEUTROPHILS ABSOLUTE: 3.3 K/UL (ref 1.7–7.7)
NEUTROPHILS RELATIVE PERCENT: 48.4 %
PDW BLD-RTO: 15.4 % (ref 12.4–15.4)
PERFORMED ON: ABNORMAL
PERFORMED ON: NORMAL
PHOSPHORUS: 3.6 MG/DL (ref 2.5–4.9)
PLATELET # BLD: 282 K/UL (ref 135–450)
PMV BLD AUTO: 7.5 FL (ref 5–10.5)
POTASSIUM REFLEX MAGNESIUM: 4.2 MMOL/L (ref 3.5–5.1)
POTASSIUM SERPL-SCNC: 3.1 MMOL/L (ref 3.5–5.1)
RBC # BLD: 4.65 M/UL (ref 4–5.2)
SODIUM BLD-SCNC: 144 MMOL/L (ref 136–145)
TOTAL PROTEIN: 7.7 G/DL (ref 6.4–8.2)
WBC # BLD: 6.8 K/UL (ref 4–11)

## 2022-08-22 PROCEDURE — 83735 ASSAY OF MAGNESIUM: CPT

## 2022-08-22 PROCEDURE — 36415 COLL VENOUS BLD VENIPUNCTURE: CPT

## 2022-08-22 PROCEDURE — 2580000003 HC RX 258: Performed by: INTERNAL MEDICINE

## 2022-08-22 PROCEDURE — 6370000000 HC RX 637 (ALT 250 FOR IP): Performed by: INTERNAL MEDICINE

## 2022-08-22 PROCEDURE — 84100 ASSAY OF PHOSPHORUS: CPT

## 2022-08-22 PROCEDURE — 84132 ASSAY OF SERUM POTASSIUM: CPT

## 2022-08-22 PROCEDURE — 94761 N-INVAS EAR/PLS OXIMETRY MLT: CPT

## 2022-08-22 PROCEDURE — 80053 COMPREHEN METABOLIC PANEL: CPT

## 2022-08-22 PROCEDURE — 6360000002 HC RX W HCPCS: Performed by: INTERNAL MEDICINE

## 2022-08-22 PROCEDURE — 1200000000 HC SEMI PRIVATE

## 2022-08-22 PROCEDURE — 85025 COMPLETE CBC W/AUTO DIFF WBC: CPT

## 2022-08-22 RX ORDER — FUROSEMIDE 20 MG/1
20 TABLET ORAL DAILY
Qty: 60 TABLET | Refills: 0
Start: 2022-08-22 | End: 2022-08-24 | Stop reason: SDUPTHER

## 2022-08-22 RX ORDER — LISINOPRIL 40 MG/1
20 TABLET ORAL DAILY
Qty: 30 TABLET | Refills: 5 | Status: SHIPPED | OUTPATIENT
Start: 2022-08-22 | End: 2022-08-23 | Stop reason: HOSPADM

## 2022-08-22 RX ORDER — CEFDINIR 300 MG/1
300 CAPSULE ORAL EVERY 12 HOURS SCHEDULED
Qty: 6 CAPSULE | Refills: 0 | Status: SHIPPED | OUTPATIENT
Start: 2022-08-22 | End: 2022-08-23 | Stop reason: SDUPTHER

## 2022-08-22 RX ORDER — POTASSIUM CHLORIDE 20 MEQ/1
40 TABLET, EXTENDED RELEASE ORAL EVERY 4 HOURS
Status: COMPLETED | OUTPATIENT
Start: 2022-08-22 | End: 2022-08-22

## 2022-08-22 RX ORDER — MAGNESIUM SULFATE IN WATER 40 MG/ML
2000 INJECTION, SOLUTION INTRAVENOUS ONCE
Status: COMPLETED | OUTPATIENT
Start: 2022-08-22 | End: 2022-08-22

## 2022-08-22 RX ORDER — MECLIZINE HYDROCHLORIDE 25 MG/1
25 TABLET ORAL 3 TIMES DAILY PRN
Qty: 30 TABLET | Refills: 0 | Status: SHIPPED | OUTPATIENT
Start: 2022-08-22 | End: 2022-09-01

## 2022-08-22 RX ADMIN — AMLODIPINE BESYLATE 10 MG: 5 TABLET ORAL at 08:21

## 2022-08-22 RX ADMIN — INSULIN GLARGINE 30 UNITS: 100 INJECTION, SOLUTION SUBCUTANEOUS at 22:25

## 2022-08-22 RX ADMIN — BUSPIRONE HYDROCHLORIDE 7.5 MG: 5 TABLET ORAL at 08:21

## 2022-08-22 RX ADMIN — Medication 10 ML: at 08:23

## 2022-08-22 RX ADMIN — SODIUM CHLORIDE: 9 INJECTION, SOLUTION INTRAVENOUS at 08:32

## 2022-08-22 RX ADMIN — ENOXAPARIN SODIUM 40 MG: 100 INJECTION SUBCUTANEOUS at 22:22

## 2022-08-22 RX ADMIN — GABAPENTIN 300 MG: 300 CAPSULE ORAL at 22:24

## 2022-08-22 RX ADMIN — MECLIZINE 25 MG: 12.5 TABLET ORAL at 13:05

## 2022-08-22 RX ADMIN — MECLIZINE 25 MG: 12.5 TABLET ORAL at 22:20

## 2022-08-22 RX ADMIN — POTASSIUM CHLORIDE 40 MEQ: 1500 TABLET, EXTENDED RELEASE ORAL at 13:05

## 2022-08-22 RX ADMIN — MECLIZINE 25 MG: 12.5 TABLET ORAL at 08:21

## 2022-08-22 RX ADMIN — CEFDINIR 300 MG: 300 CAPSULE ORAL at 08:27

## 2022-08-22 RX ADMIN — ACETAMINOPHEN 650 MG: 325 TABLET ORAL at 17:16

## 2022-08-22 RX ADMIN — LISINOPRIL 10 MG: 10 TABLET ORAL at 08:21

## 2022-08-22 RX ADMIN — CLOPIDOGREL BISULFATE 75 MG: 75 TABLET ORAL at 08:21

## 2022-08-22 RX ADMIN — POTASSIUM CHLORIDE 40 MEQ: 1500 TABLET, EXTENDED RELEASE ORAL at 08:27

## 2022-08-22 RX ADMIN — MAGNESIUM SULFATE HEPTAHYDRATE 2000 MG: 40 INJECTION, SOLUTION INTRAVENOUS at 08:33

## 2022-08-22 RX ADMIN — CEFDINIR 300 MG: 300 CAPSULE ORAL at 22:30

## 2022-08-22 RX ADMIN — FUROSEMIDE 20 MG: 20 TABLET ORAL at 08:21

## 2022-08-22 RX ADMIN — GABAPENTIN 300 MG: 300 CAPSULE ORAL at 08:21

## 2022-08-22 RX ADMIN — Medication 10 ML: at 22:25

## 2022-08-22 RX ADMIN — ATORVASTATIN CALCIUM 40 MG: 40 TABLET, FILM COATED ORAL at 22:23

## 2022-08-22 RX ADMIN — BUSPIRONE HYDROCHLORIDE 7.5 MG: 5 TABLET ORAL at 22:21

## 2022-08-22 RX ADMIN — FUROSEMIDE 20 MG: 20 TABLET ORAL at 17:17

## 2022-08-22 ASSESSMENT — PAIN SCALES - GENERAL
PAINLEVEL_OUTOF10: 3
PAINLEVEL_OUTOF10: 0

## 2022-08-22 ASSESSMENT — PAIN DESCRIPTION - DESCRIPTORS: DESCRIPTORS: ACHING

## 2022-08-22 ASSESSMENT — PAIN DESCRIPTION - LOCATION: LOCATION: HEAD

## 2022-08-22 NOTE — PROGRESS NOTES
Physician Progress Note      Florencio Galicia  CSN #:                  [id number]  :                       1945  ADMIT DATE:       2022 10:17 AM  DISCH DATE:  Dottie Bustamante  PROVIDER #:        Karin Jimenez          QUERY TEXT:    Pt admitted with dizziness and headache. Noted documentation of likely   peripheral vertigo on 22 Consult note by ordered Neurology consultant. If possible, please document in progress notes and discharge summary:    The medical record reflects the following:  Risk Factors: Chronic hypertension with hypotension, CVA, Diabetic   polyneuropathy  Clinical Indicators: Patient to ED with concerns for continued dizziness and   headache. Neuro consulted and notes, \"acute on chronic dizziness, likely   peripheral vertigo, plan is not to repeat recent neuroimaging. \" IM plan to   continue Meclizine, monitor BP on home meds. Treatment: Orthostatic pressures, PT, OT, hydration, Plavix, Statin, Control   BS. Options provided:  -- Peripheral vertigo confirmed present on admission  -- Peripheral vertigo ruled out  -- Defer to Neurology consultant documentation regarding Peripheral vertigo  -- Other - I will add my own diagnosis  -- Disagree - Not applicable / Not valid  -- Disagree - Clinically unable to determine / Unknown  -- Refer to Clinical Documentation Reviewer    PROVIDER RESPONSE TEXT:    The diagnosis of Peripheral vertigo was confirmed as present on admission.     Query created by: Mary Mckeon on 2022 1:58 PM      Electronically signed by:  Karin Jimenez 2022 2:02 PM

## 2022-08-22 NOTE — DISCHARGE SUMMARY
[facility]    Patient Demographics    Patient. Naomie Saturday  Date of Birth. 1945  MRN. [id number]     Primary care provider. STACEY Maria CNP  (Tel: [phone])    Admit date: 8/19/2022    Discharge date (blank if same as Note Date): Note Date: 8/23/2022     Reason for Hospitalization. Chief Complaint   Patient presents with    Dizziness     Pt in via [address] [facility] from home, pt complains of dizziness that started 3 weeks ago, it comes and goes, she says when she moves her head from side to side it happens. No other complaints at this time. Significant Findings. Principal Problem:    Dizziness  Active Problems:    UTI (urinary tract infection)    Hypokalemia    Hypomagnesemia    Type 2 diabetes mellitus with neurologic complication, with long-term current use of insulin (HCC)    HTN (hypertension)  Resolved Problems:    * No resolved hospital problems. *       Problems and results from this hospitalization that need follow up. Dizziness  DM  HTN  Peripheral neuropathy    Significant test results and incidental findings. CT HEAD WO CONTRAST   Final Result   No acute intracranial abnormality. Diffuse atrophic changes with findings suggesting chronic microvascular   ischemia         XR CHEST PORTABLE   Final Result   No significant findings in the chest.           Invasive procedures and treatments. None     [facility]. Felipe Dominguez Saturday is a 65 y.o. 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 She was recently admitted to [facility] on 7/16/2022 and treated for TIA and UTI and discharged on 7/28/2022 to [facility]. She had presented at that time with generalized weakness and inability to hold her head up.   Work-up done at that time including MRI brain showed no acute infarction but multiple remote infarcts diffuse cerebral atrophy, micro hemorrhages likely associated with chronic hypertension. Patient unable to fully describe her dizziness; denies any vertiginous or lightheadedness, symptoms worse with head movement with difficulty ambulating, denied any headaches, focal weakness, visual disturbances, loss of consciousness, hearing impairment/aural fullness. She had requested to be discharged home from rehab since she could not fully participate and dizziness was not being addressed. Dizziness with gait instability:  Patient with history of CVA/TIAs. Recent left hemispheric CVA. Recently worked up at [facility] with MRI head, CT head, CT angio head and neck. Repeat head CT without acute intracranial abnormality. Neurology consult appreciated. No further work up recommended. No further imaging recommended at this time. Continued prn Meclizine and vestibular vertigo. PT OT recommended SNF but patient declined. No events on TELE. On statin and Plavix. TSH, B 12 and folate wnl. Hypertension: Discharged home on Lisinopril (dose decreased due to episode of hypotension) and Amlodipine. T2DM on long-term insulin with Neuropathy:  A1c 7.6%. Resumed Basal Insulin and Metformin on discharge. Hypokalemia and magnesium deficiency:  Replaced. Will need outpatient monitoring. UTI: Complete course of cefdinir. Consults. IP CONSULT TO HOSPITALIST  IP CONSULT TO NEUROLOGY  IP CONSULT TO HOME CARE NEEDS    Physical examination on discharge day. /77   Pulse 87   Temp 97.4 °F (36.3 °C) (Oral)   Resp 18   Ht 5' 5\" (1.651 m)   Wt 173 lb 8 oz (78.7 kg)   SpO2 96%   BMI 28.87 kg/m²   General appearance. Alert. Looks comfortable. HEENT. Sclera clear. Moist mucus membranes. Cardiovascular. Regular rate and rhythm, normal S1, S2. No murmur. Respiratory. Not using accessory muscles. Clear to auscultation bilaterally, no wheeze. Gastrointestinal. Abdomen soft, non-tender, not distended, normal bowel sounds  Neurology. Facial symmetry. No speech deficits. Moving all extremities equally. Extremities. No edema in lower extremities. Skin. Warm, dry, normal turgor      Condition at time of discharge: stable    Medication instructions provided to patient at discharge.      Medication List        START taking these medications      cefdinir 300 MG capsule  Commonly known as: OMNICEF  Take 1 capsule by mouth every 12 hours for 2 days     lisinopril 10 MG tablet  Commonly known as: PRINIVIL;ZESTRIL  Take 1 tablet by mouth daily  Start taking on: August 24, 2022     meclizine 25 MG tablet  Commonly known as: ANTIVERT  Take 1 tablet by mouth 3 times daily as needed for Dizziness            CHANGE how you take these medications      furosemide 20 MG tablet  Commonly known as: Lasix  Take 1 tablet by mouth daily  What changed: when to take this     insulin glargine 100 UNIT/ML injection pen  Commonly known as: LANTUS;BASAGLAR  Inject 30 Units into the skin nightly  What changed: how much to take            CONTINUE taking these medications      amLODIPine 10 MG tablet  Commonly known as: NORVASC     atorvastatin 40 MG tablet  Commonly known as: LIPITOR  Take 1 tablet by mouth nightly     busPIRone 7.5 MG tablet  Commonly known as: BUSPAR     clopidogrel 75 MG tablet  Commonly known as: PLAVIX  Take 1 tablet by mouth daily     gabapentin 300 MG capsule  Commonly known as: NEURONTIN     metFORMIN 500 MG tablet  Commonly known as: GLUCOPHAGE     ondansetron 4 MG disintegrating tablet  Commonly known as: Zofran ODT  Take 1 tablet by mouth every 8 hours as needed for Nausea     potassium chloride 10 MEQ extended release tablet  Commonly known as: KLOR-CON M  Take 1 tablet by mouth 2 times daily               Where to Get Your Medications        These medications were sent to [facility]/pharmacy #[id number]- [place], [place] - [address] [address] [phone] [address] [facility]      Phone: [phone]   cefdinir 300 MG capsule  lisinopril 10 MG tablet  meclizine 25 MG tablet       Information about where to get these medications is not yet available    Ask your nurse or doctor about these medications  furosemide 20 MG tablet  insulin glargine 100 UNIT/ML injection pen         Discharge recommendations given to patient. Follow Up. With PCP in 1 week   Disposition. Home with HHPT  Activity. activity as tolerated  Diet: ADULT DIET; Regular; 4 carb choices (60 gm/meal); Low Sodium (2 gm)      Spent 35 minutes in discharge process.     Signed:  Karin Jimenez MD     8/23/2022 3:51 PM

## 2022-08-22 NOTE — PROGRESS NOTES
Morning assessments, vitals signs, and medications complete. Patient's blood sugar noted to be 71 this morning. Patient denies any discomfort/pain. No insulin given, patient given orange juice, she also ate all of her breakfast. No further needs noted at this time. Call light is within reach, bed is in lowest position, bed alarm is on.

## 2022-08-22 NOTE — PLAN OF CARE
Problem: Safety - Adult  Goal: Free from fall injury  8/22/2022 0203 by Shlomo Millan RN  Outcome: Progressing  8/21/2022 1455 by Elizabeth Alcantara RN  Outcome: Progressing     Problem: Skin/Tissue Integrity  Goal: Absence of new skin breakdown  Description: 1. Monitor for areas of redness and/or skin breakdown  2. Assess vascular access sites hourly  3. Every 4-6 hours minimum:  Change oxygen saturation probe site  4. Every 4-6 hours:  If on nasal continuous positive airway pressure, respiratory therapy assess nares and determine need for appliance change or resting period.   Outcome: Progressing     Problem: ABCDS Injury Assessment  Goal: Absence of physical injury  Outcome: Progressing

## 2022-08-22 NOTE — DISCHARGE INSTR - COC
Continuity of Care Form    Patient Name: Lily Knox Saturday   :  1945  MRN:  [id number]    Admit date:  2022  Discharge date:  22    Code Status Order: DNR-CCA   Advance Directives:     Admitting Physician:  Torres Gusman MD  PCP: STACEY Velazquez CNP    Discharging Nurse: NATACHA Tillman [facility] Unit/Room#: 8US-1337/6418-69  Discharging Unit Phone Number: [phone]    Emergency Contact:   Extended Emergency Contact Information  Primary Emergency Contact: [facility]  Home Phone: [phone]  Relation: Brother/Sister  Secondary Emergency Contact: [facility] Phone: [phone]  Relation: Brother/Sister    Past Surgical History:  Past Surgical History:   Procedure Laterality Date    CHOLECYSTECTOMY         Immunization History: There is no immunization history on file for this patient.     Active Problems:  Patient Active Problem List   Diagnosis Code    Neck mass R22.1    TIA (transient ischemic attack) G45.9    Type 2 diabetes mellitus with neurologic complication, with long-term current use of insulin (HCC) E11.49, Z79.4    HTN (hypertension) I10    Dyslipidemia E78.5    Weakness R53.1    Dizziness R42    UTI (urinary tract infection) N39.0    Hypokalemia E87.6    Hypomagnesemia E83.42       Isolation/Infection:   Isolation            No Isolation          Patient Infection Status       None to display            Nurse Assessment:  Last Vital Signs: /78   Pulse 90   Temp 98 °F (36.7 °C) (Oral)   Resp 16   Ht 5' 5\" (1.651 m)   Wt 173 lb 8 oz (78.7 kg)   SpO2 100%   BMI 28.87 kg/m²     Last documented pain score (0-10 scale):    Last Weight:   Wt Readings from Last 1 Encounters:   22 173 lb 8 oz (78.7 kg)     Mental Status:  oriented, alert, coherent, logical, thought processes intact, and able to concentrate and follow conversation    IV Access:  - None    Nursing Mobility/ADLs:  Walking   Assisted  Transfer  Assisted  Bathing Assisted  Dressing  Assisted  Toileting  Assisted  Feeding  Independent  Med Admin  Assisted  Med Delivery   whole    Wound Care Documentation and Therapy:        Elimination:  Continence: Bowel: Yes  Bladder: Yes  Urinary Catheter: None   Colostomy/Ileostomy/Ileal Conduit: No       Date of Last BM: 08/22/2022    Intake/Output Summary (Last 24 hours) at 8/22/2022 1116  Last data filed at 8/22/2022 0833  Gross per 24 hour   Intake 650 ml   Output 1200 ml   Net -550 ml     I/O last 3 completed shifts: In: 250 [P.O.:250]  Out: 66 91 21 [Urine:1650]    Safety Concerns: At Risk for Falls    Impairments/Disabilities:      Unsteady gait and dizziness    Nutrition Therapy:  Current Nutrition Therapy:   - Oral Diet:  Carb Control 4 carbs/meal (1800kcals/day)    Routes of Feeding: Oral  Liquids: Thin Liquids  Daily Fluid Restriction: no  Last Modified Barium Swallow with Video (Video Swallowing Test): not done    Treatments at the Time of Hospital Discharge:   Respiratory Treatments: none    Oxygen Therapy:  is not on home oxygen therapy.   Ventilator:    - No ventilator support    Rehab Therapies: Physical Therapy, Occupational Therapy, Orthostatic pressures,   Weight Bearing Status/Restrictions: No weight bearing restrictions  Other Medical Equipment (for information only, NOT a DME order):  wheelchair and walker- patient currently uses and has at home  Other Treatments: blood pressure and blood sugar control    Patient's personal belongings (please select all that are sent with patient):  None    RN SIGNATURE:  Electronically signed by Lashawn Rico RN on 8/22/22 at 3:20 PM EDT    CASE MANAGEMENT/SOCIAL WORK SECTION    Inpatient Status Date: 08/21/2022 - 08/25/2022    Readmission Risk Assessment Score:  Readmission Risk              Risk of Unplanned Readmission:  14           Discharging to Facility/ [facility]  Phone: [phone]  Fax: [phone]       / signature: Electronically signed by John Fountain RN on 8/22/22 at 1:35 PM EDT    PHYSICIAN SECTION    Prognosis: Fair    Condition at Discharge: Stable    Rehab Potential (if transferring to Rehab): Fair    Recommended Labs or Other Treatments After Discharge: Outpatient vestibular therapy. Repeat potassium level in 1 week. Physician Certification: I certify the above information and transfer of Lacy Knight Saturday  is necessary for the continuing treatment of the diagnosis listed and that she requires [address] for greater 30 days.      Update Admission H&P: No change in H&P    PHYSICIAN SIGNATURE:  Electronically signed by Get [place] MD on 8/22/22 at 11:17 AM EDT

## 2022-08-22 NOTE — CARE COORDINATION
CM phoned [facility] to see if they have a female skilled bed available and at this time they have a waiting list for skilled beds.

## 2022-08-22 NOTE — DISCHARGE INSTRUCTIONS
Please monitor blood pressure and blood glucose at home on reduced doses of insulin and lisinopril. Follow-up with PCP within 1 week after discharge. Follow-up outpatient with PT for vestibular therapy. Outpatient follow-up with neurologist if dizziness persists. Follow-up with PCP for monitoring of potassium and magnesium level.

## 2022-08-22 NOTE — CARE COORDINATION
Per patient, she has initiated the process to appeal her discharge. IMM & Detailed Notice of Discharge were given to patient.

## 2022-08-22 NOTE — PROGRESS NOTES
Discharge order received. Discharge BRANDEN and AVS complete. Upon entering patient's room to remove her peripheral IV, patient states that \"I don't feel well enough to leave the hospital. I feel to weak and want to appeal my discharge\". Vital signs obtained and are WNL. Patient is currently sitting up in chair. Patient has information from case management and states that she is going to call the number on her paperwork to appeal. MD is aware, CM is aware.

## 2022-08-22 NOTE — CARE COORDINATION
08/22/22 1711   Documentation for Discharge Appeal   Discharge Appealed by Patient   Detailed Notice of Discharge given to: Patient   Date Notice of Discharge given: 08/22/22   Time Notice of Discharge given: [phone] [id number]

## 2022-08-22 NOTE — CARE COORDINATION
Discharge Planning Assessment    RN/SW discharge planner met with patient/ (and family member) to discuss reason for admission, current living situation, and potential needs at the time of discharge    Demographics/Insurance verified Yes Medicare    Current type of dwelling: ranch style home    Patient from ECF/SW confirmed with: has been to [facility] in the past    Living arrangements: with son    Level of function/Support: family    PCP: Irwin Frank    Last Visit to PCP:    DME: rolling walker, rollator, wheelchair    Active with any community resources/agencies/skilled home care: active   with the [facility] program in [facility]. Care manager: Radha Adams [phone]. Patient states she gets meals on wheels. Unable to reach care manager for further information on services. Medication compliance issues: none mentioned    Financial issues that could impact healthcare: none mentioned    Tentative discharge plan:  Discussed and provided facilities of choice if transition to a skilled nursing facility is required at the time of discharge  Spoke to patient and she states that she lives with a son who receives dialysis. She was also interested in going to ARU. Informed patient that she may not qualify for the ARU program. At this time, the patient may consider [facility] or home with home care if she can not go to ARU. Discussed with patient and/or family that on the day of discharge home tentative time of discharge will be between 10 AM and noon.     Transportation at the time of discharge: family

## 2022-08-22 NOTE — CARE COORDINATION
CM received a call from [facility] beds at the [facility] ([phone])[id number] they were getting ready to start services with the patient before she admitted to the hospital, skilled nursing, pt/ot.  Orders can be faxed to [phone]

## 2022-08-22 NOTE — PLAN OF CARE
Problem: Safety - Adult  Goal: Free from fall injury  8/22/2022 1042 by Mague Ribeiro RN  Outcome: Progressing  8/22/2022 0203 by Linda Jones RN  Outcome: Progressing     Problem: Skin/Tissue Integrity  Goal: Absence of new skin breakdown  Description: 1. Monitor for areas of redness and/or skin breakdown  2. Assess vascular access sites hourly  3. Every 4-6 hours minimum:  Change oxygen saturation probe site  4. Every 4-6 hours:  If on nasal continuous positive airway pressure, respiratory therapy assess nares and determine need for appliance change or resting period.   8/22/2022 1042 by Mague Ribeiro RN  Outcome: Progressing  8/22/2022 0203 by Linda Jones RN  Outcome: Progressing     Problem: ABCDS Injury Assessment  Goal: Absence of physical injury  8/22/2022 0203 by Linda Jones RN  Outcome: Progressing

## 2022-08-23 LAB
ANION GAP SERPL CALCULATED.3IONS-SCNC: 8 MMOL/L (ref 3–16)
BUN BLDV-MCNC: 19 MG/DL (ref 7–20)
CALCIUM SERPL-MCNC: 9.2 MG/DL (ref 8.3–10.6)
CHLORIDE BLD-SCNC: 105 MMOL/L (ref 99–110)
CO2: 25 MMOL/L (ref 21–32)
CREAT SERPL-MCNC: 0.8 MG/DL (ref 0.6–1.2)
GFR AFRICAN AMERICAN: >60
GFR NON-AFRICAN AMERICAN: >60
GLUCOSE BLD-MCNC: 107 MG/DL (ref 70–99)
GLUCOSE BLD-MCNC: 123 MG/DL (ref 70–99)
GLUCOSE BLD-MCNC: 158 MG/DL (ref 70–99)
GLUCOSE BLD-MCNC: 164 MG/DL (ref 70–99)
GLUCOSE BLD-MCNC: 91 MG/DL (ref 70–99)
MAGNESIUM: 2 MG/DL (ref 1.8–2.4)
PERFORMED ON: ABNORMAL
PERFORMED ON: NORMAL
POTASSIUM REFLEX MAGNESIUM: 3.9 MMOL/L (ref 3.5–5.1)
SODIUM BLD-SCNC: 138 MMOL/L (ref 136–145)

## 2022-08-23 PROCEDURE — 80048 BASIC METABOLIC PNL TOTAL CA: CPT

## 2022-08-23 PROCEDURE — 6360000002 HC RX W HCPCS: Performed by: INTERNAL MEDICINE

## 2022-08-23 PROCEDURE — 1200000000 HC SEMI PRIVATE

## 2022-08-23 PROCEDURE — 83735 ASSAY OF MAGNESIUM: CPT

## 2022-08-23 PROCEDURE — 36415 COLL VENOUS BLD VENIPUNCTURE: CPT

## 2022-08-23 PROCEDURE — 6370000000 HC RX 637 (ALT 250 FOR IP): Performed by: INTERNAL MEDICINE

## 2022-08-23 PROCEDURE — 2580000003 HC RX 258: Performed by: INTERNAL MEDICINE

## 2022-08-23 PROCEDURE — 97530 THERAPEUTIC ACTIVITIES: CPT

## 2022-08-23 RX ORDER — FUROSEMIDE 20 MG/1
20 TABLET ORAL DAILY
Status: DISCONTINUED | OUTPATIENT
Start: 2022-08-24 | End: 2022-08-24

## 2022-08-23 RX ORDER — LISINOPRIL 10 MG/1
10 TABLET ORAL DAILY
Qty: 30 TABLET | Refills: 3 | Status: ON HOLD | OUTPATIENT
Start: 2022-08-24

## 2022-08-23 RX ORDER — CEFDINIR 300 MG/1
300 CAPSULE ORAL EVERY 12 HOURS SCHEDULED
Qty: 4 CAPSULE | Refills: 0 | Status: SHIPPED | OUTPATIENT
Start: 2022-08-23 | End: 2022-08-24 | Stop reason: HOSPADM

## 2022-08-23 RX ADMIN — MECLIZINE 25 MG: 12.5 TABLET ORAL at 21:18

## 2022-08-23 RX ADMIN — FUROSEMIDE 20 MG: 20 TABLET ORAL at 09:50

## 2022-08-23 RX ADMIN — Medication 10 ML: at 21:22

## 2022-08-23 RX ADMIN — ATORVASTATIN CALCIUM 40 MG: 40 TABLET, FILM COATED ORAL at 21:18

## 2022-08-23 RX ADMIN — CEFDINIR 300 MG: 300 CAPSULE ORAL at 22:29

## 2022-08-23 RX ADMIN — INSULIN GLARGINE 30 UNITS: 100 INJECTION, SOLUTION SUBCUTANEOUS at 21:22

## 2022-08-23 RX ADMIN — ENOXAPARIN SODIUM 40 MG: 100 INJECTION SUBCUTANEOUS at 21:17

## 2022-08-23 RX ADMIN — MECLIZINE 25 MG: 12.5 TABLET ORAL at 09:49

## 2022-08-23 RX ADMIN — Medication 10 ML: at 09:52

## 2022-08-23 RX ADMIN — GABAPENTIN 300 MG: 300 CAPSULE ORAL at 21:18

## 2022-08-23 RX ADMIN — AMLODIPINE BESYLATE 10 MG: 5 TABLET ORAL at 09:50

## 2022-08-23 RX ADMIN — LISINOPRIL 10 MG: 10 TABLET ORAL at 09:50

## 2022-08-23 RX ADMIN — BUSPIRONE HYDROCHLORIDE 7.5 MG: 5 TABLET ORAL at 21:18

## 2022-08-23 RX ADMIN — CEFDINIR 300 MG: 300 CAPSULE ORAL at 09:49

## 2022-08-23 RX ADMIN — GABAPENTIN 300 MG: 300 CAPSULE ORAL at 09:49

## 2022-08-23 RX ADMIN — BUSPIRONE HYDROCHLORIDE 7.5 MG: 5 TABLET ORAL at 09:49

## 2022-08-23 RX ADMIN — MECLIZINE 25 MG: 12.5 TABLET ORAL at 14:19

## 2022-08-23 RX ADMIN — CLOPIDOGREL BISULFATE 75 MG: 75 TABLET ORAL at 09:50

## 2022-08-23 ASSESSMENT — PAIN SCALES - GENERAL
PAINLEVEL_OUTOF10: 0
PAINLEVEL_OUTOF10: 0

## 2022-08-23 NOTE — PROGRESS NOTES
[facility]   Phone: [phone]    Physical Therapy    [] Initial Evaluation            [x] Daily Treatment Note         [] Discharge Summary      Patient: Madison Sanchez Saturday   : 1945   MRN: [id number]   Date of Service:  2022  Admitting Diagnosis: Dizziness  Current Admission Summary: Madison Sanchez magy is a 65 y.o. female who presents to the emergency department complaining of dizziness and headache on and off for 1 month. Patient was seen at [facility] and admitted. She had a CT scan of the head, CTA head and neck and MRI. There was no acute infarction noted. Patient had numerous remote microhemorrhages associated with chronic hypertension. The patient states that she was transferred to [facility] for physical therapy. She states that she has chronic diabetic neuropathy which does affect her walking as well. She feels as though her dizziness was not fully addressed or managed well at the physical therapy therefore requested to go home. She has been home since Saturday. She states that she is still feeling persistently dizzy with head movement. When she keeps her head still she denies any dizziness. She does report a mild generalized headache. Past Medical History:  has a past medical history of Hypertension and Stroke (cerebrum) ([address] Past Surgical History:  has a past surgical history that includes Cholecystectomy. Discharge Recommendations: Madison Sanchez Saturday scored a 15/24 on the AM-PAC short mobility form. Current research shows that an AM-PAC score of 17 or less is typically not associated with a discharge to the patient's home setting. Based on the patient's AM-PAC score and their current functional mobility deficits, it is recommended that the patient have 3-5 sessions per week of Physical Therapy at d/c to increase the patient's independence.   Please see assessment section for further patient specific details. If patient discharges prior to next session this note will serve as a discharge summary. Please see below for the latest assessment towards goals. DME Required For Discharge: patient has all required DME for discharge  Precautions/Restrictions: high fall risk  Weight Bearing Restrictions: no restrictions  [] Right Upper Extremity  [] Left Upper Extremity [] Right Lower Extremity  [] Left Lower Extremity     Required Braces/Orthotics: no braces required   [] Right  [] Left  Positional Restrictions:no positional restrictions    Pre-Admission Information   Lives With: son, grandchildren    Type of Home: house  Home Layout: one level, laundry in basement  Home Access: ramped entry  Bathroom Layout: walker accessible, walk in shower  Bathroom Equipment: . Comment: none pt does sponge baths  Toilet Height: standard height  Home Equipment: rolling walker, rollator - 4 wheeled walker, manual wheelchair  Transfer Assistance: modified independent with use of 4ww  Ambulation Assistance:modified independent with use of 4ww  ADL Assistance: independent with all ADL's  IADL Assistance: requires assistance with laundry  Active :        [] Yes  [x] No  Hand Dominance: [x] Left  [] Right  Current Employment: retired. Occupation: worked for [employer]:   Recent Falls: Pt has had no falls in last 6 months per her report      Subjective  General: Pt seated in chair at arrival. Reporting some dizziness at rest after taking all her medications. Mostly concerned about her feet neuropathy. Pain: 0/10  Pain Interventions: not applicable       Functional Mobility  Bed Mobility  Bed mobility not completed on this date. Comments:   Transfers  Sit to stand transfer: moderate assistance  Stand to sit transfer: minimal assistance  Comments: Cues for anterior trunk lean, pt performed a total of 7 during session. Pt gets stuck mid-way during stance.      Ambulation  Surface:level surface  Assistive Device: rolling walker  Assistance: minimal assistance  Distance: 30 ft x 2  Gait Mechanics: slow jessie, decreased step height and length, ELLIS wide, unsteady, decreased foot proprioception secondary to neuropathy. Comments:    Stair Mobility  Stair mobility not completed on this date. Comments:  Wheelchair Mobility:  No w/c mobility completed on this date. Comments:  Balance  Static Sitting Balance: good: independent with functional balance in unsupported position  Dynamic Sitting Balance: good: independent with functional balance in unsupported position  Static Standing Balance: fair (-): maintains balance at min A with use of UE support  Dynamic Standing Balance: fair (-): maintains balance at min A with use of UE support  Comments:    Other Therapeutic Interventions  B LAQ x 20 plus 30 second hold. B marching x 20. B heel raises x 20. Eye tracking without increased dizziness but tracking was difficult with med/lateral movements. Vitals stable. Functional Outcomes  AM-PAC Inpatient Mobility Raw Score : 15              Cognition  WFL  Orientation:    alert and oriented x 4  Command Following:   [facility]    Education  Barriers To Learning: none  Patient Education: patient educated on goals, PT role and benefits, plan of care, general safety, discharge recommendations  Learning Assessment:  patient verbalizes understanding, would benefit from continued reinforcement    Assessment  Activity Tolerance: fair, fatigues quickly  Impairments Requiring Therapeutic Intervention: decreased functional mobility, decreased ADL status, decreased safety awareness, decreased endurance, decreased balance, vestibular impairment, decreased posture  Prognosis: good  Clinical Assessment: Pt presents with deficits in her mobility that are below her baseline level. Pt needing moderate assistance to stand and having difficulty walking hospital room secondary to neuropathy in feet. Minor dizzy reports.  Unsafe to discharge home though pt does not

## 2022-08-23 NOTE — PLAN OF CARE
Problem: Safety - Adult  Goal: Free from fall injury  Outcome: Progressing     Problem: Chronic Conditions and Co-morbidities  Goal: Patient's chronic conditions and co-morbidity symptoms are monitored and maintained or improved  Outcome: Progressing     Problem: Skin/Tissue Integrity  Goal: Absence of new skin breakdown  Description: 1. Monitor for areas of redness and/or skin breakdown  2. Assess vascular access sites hourly  3. Every 4-6 hours minimum:  Change oxygen saturation probe site  4. Every 4-6 hours:  If on nasal continuous positive airway pressure, respiratory therapy assess nares and determine need for appliance change or resting period.   Outcome: Progressing     Problem: ABCDS Injury Assessment  Goal: Absence of physical injury  Outcome: Progressing

## 2022-08-23 NOTE — PROGRESS NOTES
Hospitalist Progress Note      PCP: STACEY Collins CNP    Date of Admission: 8/19/2022    Chief Complaint: Dizziness    Hospital Course: Madison Sanchez Saturday is a 65 y.o. 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 She was recently admitted to [facility] on 7/16/2022 and treated for TIA and UTI and discharged on 7/28/2022 to [facility]. She had presented at that time with generalized weakness and inability to hold her head up. Work-up done at that time including MRI brain showed no acute infarction but multiple remote infarcts diffuse cerebral atrophy, micro hemorrhages likely associated with chronic hypertension. Patient unable to fully describe her dizziness; denies any vertiginous or lightheadedness, symptoms worse with head movement with difficulty ambulating, denied any headaches, focal weakness, visual disturbances, loss of consciousness, hearing impairment/aural fullness. She had requested to be discharged home from rehab since she could not fully participate and dizziness was not being addressed. Subjective: Patient seen and examined. No interval events. Still with dizziness and difficulty ambulating and refusing discharge. No pain, fever or chills.          Medications:  Reviewed    Infusion Medications    sodium chloride Stopped (08/22/22 1500)    dextrose       Scheduled Medications    lisinopril  10 mg Oral Daily    sodium chloride flush  5-40 mL IntraVENous 2 times per day    enoxaparin  40 mg SubCUTAneous Nightly    insulin lispro  0-4 Units SubCUTAneous TID     insulin lispro  0-4 Units SubCUTAneous Nightly    insulin glargine  30 Units SubCUTAneous Nightly    cefdinir  300 mg Oral 2 times per day    amLODIPine  10 mg Oral Daily    atorvastatin  40 mg Oral Nightly    busPIRone  7.5 mg Oral BID    clopidogrel 75 mg Oral Daily    furosemide  20 mg Oral BID    gabapentin  300 mg Oral BID    meclizine  25 mg Oral TID     PRN Meds: sodium chloride flush, sodium chloride, ondansetron **OR** ondansetron, polyethylene glycol, acetaminophen **OR** acetaminophen, dextrose bolus **OR** dextrose bolus, glucagon (rDNA), dextrose    No intake or output data in the 24 hours ending 08/23/22 1547      Physical Exam Performed:    /77   Pulse 87   Temp 97.4 °F (36.3 °C) (Oral)   Resp 18   Ht 5' 5\" (1.651 m)   Wt 173 lb 8 oz (78.7 kg)   SpO2 96%   BMI 28.87 kg/m²     General appearance: No apparent distress, appears stated age and cooperative. HEENT: Pupils equal, round, and reactive to light. Conjunctivae/corneas clear. Neck: Supple, with full range of motion. No jugular venous distention. Trachea midline. Respiratory:  Normal respiratory effort. Clear to auscultation, bilaterally without Rales/Wheezes/Rhonchi. Cardiovascular: Regular rate and rhythm with normal S1/S2 without murmurs, rubs or gallops. Abdomen: Soft, non-tender, non-distended with normal bowel sounds. Musculoskeletal: No clubbing, cyanosis or edema bilaterally. Full range of motion without deformity. Skin: Skin color, texture, turgor normal.  No rashes or lesions. Neurologic:  Neurovascularly intact without any focal sensory/motor deficits. Cranial nerves: II-XII intact, grossly non-focal. Gait not assessed.   Psychiatric: Alert and oriented, thought content appropriate, normal insight  Capillary Refill: Brisk,3 seconds, normal   Peripheral Pulses: +2 palpable, equal bilaterally       Labs:   Recent Labs     08/21/22  0734 08/22/22  0643   WBC 6.2 6.8   HGB 12.1 11.9*   HCT 37.7 37.3    282     Recent Labs     08/21/22  0724 08/22/22  0642 08/22/22  1753 08/23/22  0614    144  --  138   K 3.5 3.1* 4.2 3.9    106  --  105   CO2 25 29  --  25   BUN 22* 18  --  19   CREATININE 0.9 0.8  --  0.8   CALCIUM 9.3 9.2  --  9.2   PHOS 3.8 3.6 --   --      Recent Labs     08/21/22  0724 08/22/22  0642   AST 18 14*   ALT 13 13   BILITOT 0.5 0.4   ALKPHOS 87 88     No results for input(s): INR in the last 72 hours. No results for input(s): Mirna Kaska in the last 72 hours. Urinalysis:      Lab Results   Component Value Date/Time    NITRU POSITIVE 08/19/2022 11:24 AM    WBCUA 24 08/19/2022 11:24 AM    BACTERIA 4+ 08/19/2022 11:24 AM    RBCUA 3 08/19/2022 11:24 AM    BLOODU TRACE 08/19/2022 11:24 AM    SPECGRAV 1.010 08/19/2022 11:24 AM    GLUCOSEU Negative 08/19/2022 11:24 AM       Radiology:  CT HEAD WO CONTRAST   Final Result   No acute intracranial abnormality. Diffuse atrophic changes with findings suggesting chronic microvascular   ischemia         XR CHEST PORTABLE   Final Result   No significant findings in the chest.                 Assessment/Plan:    Active Hospital Problems    Diagnosis     UTI (urinary tract infection) [N39.0]      Priority: Medium    Hypokalemia [E87.6]      Priority: Medium    Hypomagnesemia [E83.42]      Priority: Medium    Dizziness [R42]      Priority: Medium    HTN (hypertension) [I10]     Type 2 diabetes mellitus with neurologic complication, with long-term current use of insulin (HCC) [E11.49, Z79.4]      Dizziness with gait instability:  Patient with history of CVA/TIAs. Recent left hemispheric CVA. Recently worked up at [facility] with MRI head, CT head, CT angio head and neck. Repeat head CT without acute intracranial abnormality. Neurology consult appreciated. No further work up recommended. No further imaging recommended at this time. Continued prn Meclizine and vestibular vertigo. PT OT recommended SNF but patient declined. No events on TELE. On statin and Plavix. TSH, B 12 and folate wnl. Hypertension: Discharged home on Lisinopril (dose decreased due to episode of hypotension) and Amlodipine. T2DM on long-term insulin with Neuropathy:  A1c 7.6%.  Resumed Basal Insulin and Metformin on discharge. Hypokalemia and magnesium deficiency:  Replaced. Will need outpatient monitoring. UTI: Complete course of cefdinir. DVT Prophylaxis: Lovenox  Diet: ADULT DIET; Regular; 4 carb choices (60 gm/meal); Low Sodium (2 gm)  Code Status: DNR-CCA    PT/OT Eval Status: SNF recommended.     Dispo - once medically stable    Vickie Acosta MD

## 2022-08-24 LAB
GLUCOSE BLD-MCNC: 107 MG/DL (ref 70–99)
GLUCOSE BLD-MCNC: 128 MG/DL (ref 70–99)
GLUCOSE BLD-MCNC: 128 MG/DL (ref 70–99)
GLUCOSE BLD-MCNC: 205 MG/DL (ref 70–99)
PERFORMED ON: ABNORMAL

## 2022-08-24 PROCEDURE — 2580000003 HC RX 258: Performed by: INTERNAL MEDICINE

## 2022-08-24 PROCEDURE — 97530 THERAPEUTIC ACTIVITIES: CPT

## 2022-08-24 PROCEDURE — 97110 THERAPEUTIC EXERCISES: CPT

## 2022-08-24 PROCEDURE — 97116 GAIT TRAINING THERAPY: CPT

## 2022-08-24 PROCEDURE — 1200000000 HC SEMI PRIVATE

## 2022-08-24 PROCEDURE — 97535 SELF CARE MNGMENT TRAINING: CPT

## 2022-08-24 PROCEDURE — 6370000000 HC RX 637 (ALT 250 FOR IP): Performed by: INTERNAL MEDICINE

## 2022-08-24 PROCEDURE — 6360000002 HC RX W HCPCS: Performed by: INTERNAL MEDICINE

## 2022-08-24 RX ORDER — FUROSEMIDE 20 MG/1
20 TABLET ORAL DAILY PRN
Qty: 60 TABLET | Refills: 0 | Status: ON HOLD
Start: 2022-08-24

## 2022-08-24 RX ADMIN — ATORVASTATIN CALCIUM 40 MG: 40 TABLET, FILM COATED ORAL at 21:19

## 2022-08-24 RX ADMIN — LISINOPRIL 10 MG: 10 TABLET ORAL at 09:31

## 2022-08-24 RX ADMIN — ENOXAPARIN SODIUM 40 MG: 100 INJECTION SUBCUTANEOUS at 21:14

## 2022-08-24 RX ADMIN — GABAPENTIN 300 MG: 300 CAPSULE ORAL at 21:14

## 2022-08-24 RX ADMIN — CLOPIDOGREL BISULFATE 75 MG: 75 TABLET ORAL at 09:32

## 2022-08-24 RX ADMIN — MECLIZINE 25 MG: 12.5 TABLET ORAL at 21:14

## 2022-08-24 RX ADMIN — INSULIN GLARGINE 30 UNITS: 100 INJECTION, SOLUTION SUBCUTANEOUS at 21:14

## 2022-08-24 RX ADMIN — MECLIZINE 25 MG: 12.5 TABLET ORAL at 15:55

## 2022-08-24 RX ADMIN — CEFDINIR 300 MG: 300 CAPSULE ORAL at 15:55

## 2022-08-24 RX ADMIN — Medication 10 ML: at 21:14

## 2022-08-24 RX ADMIN — MECLIZINE 25 MG: 12.5 TABLET ORAL at 09:31

## 2022-08-24 RX ADMIN — Medication 10 ML: at 09:31

## 2022-08-24 RX ADMIN — BUSPIRONE HYDROCHLORIDE 7.5 MG: 5 TABLET ORAL at 21:19

## 2022-08-24 RX ADMIN — GABAPENTIN 300 MG: 300 CAPSULE ORAL at 09:31

## 2022-08-24 RX ADMIN — BUSPIRONE HYDROCHLORIDE 7.5 MG: 5 TABLET ORAL at 09:31

## 2022-08-24 NOTE — PROGRESS NOTES
Hospitalist Progress Note      PCP: STACEY Castillo - CNP    Date of Admission: 8/19/2022    Chief Complaint: Dizziness    Hospital Course: Naty Giles Saturday is a 65 y.o. 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 She was recently admitted to [facility] on 7/16/2022 and treated for TIA and UTI and discharged on 7/28/2022 to [facility]. She had presented at that time with generalized weakness and inability to hold her head up. Work-up done at that time including MRI brain showed no acute infarction but multiple remote infarcts diffuse cerebral atrophy, micro hemorrhages likely associated with chronic hypertension. Patient unable to fully describe her dizziness; denies any vertiginous or lightheadedness, symptoms worse with head movement with difficulty ambulating, denied any headaches, focal weakness, visual disturbances, loss of consciousness, hearing impairment/aural fullness. She had requested to be discharged home from rehab since she could not fully participate and dizziness was not being addressed. Subjective: Patient seen and examined. No interval events. Planing of persistent dizziness and difficulty ambulating and refusing discharge. No pain, fever or chills. Appeal decision still pending.       Medications:  Reviewed    Infusion Medications    sodium chloride Stopped (08/22/22 1034)    dextrose       Scheduled Medications    lisinopril  10 mg Oral Daily    sodium chloride flush  5-40 mL IntraVENous 2 times per day    enoxaparin  40 mg SubCUTAneous Nightly    insulin lispro  0-4 Units SubCUTAneous TID     insulin lispro  0-4 Units SubCUTAneous Nightly    insulin glargine  30 Units SubCUTAneous Nightly    cefdinir  300 mg Oral 2 times per day    [Held by provider] amLODIPine  10 mg Oral Daily    atorvastatin  40 mg Oral Nightly    busPIRone  7.5 mg Oral BID    clopidogrel  75 mg Oral Daily    gabapentin  300 mg Oral BID    meclizine  25 mg Oral TID     PRN Meds: sodium chloride flush, sodium chloride, ondansetron **OR** ondansetron, polyethylene glycol, acetaminophen **OR** acetaminophen, dextrose bolus **OR** dextrose bolus, glucagon (rDNA), dextrose      Intake/Output Summary (Last 24 hours) at 8/24/2022 1309  Last data filed at 8/23/2022 1733  Gross per 24 hour   Intake 0.6 ml   Output --   Net 0.6 ml         Physical Exam Performed:    /78   Pulse 87   Temp 97.6 °F (36.4 °C) (Oral)   Resp 18   Ht 5' 5\" (1.651 m)   Wt 173 lb 8 oz (78.7 kg)   SpO2 97%   BMI 28.87 kg/m²     General appearance: No apparent distress, appears stated age and cooperative. HEENT: Pupils equal, round, and reactive to light. Conjunctivae/corneas clear. Neck: Supple, with full range of motion. No jugular venous distention. Trachea midline. Respiratory:  Normal respiratory effort. Clear to auscultation, bilaterally without Rales/Wheezes/Rhonchi. Cardiovascular: Regular rate and rhythm with normal S1/S2 without murmurs, rubs or gallops. Abdomen: Soft, non-tender, non-distended with normal bowel sounds. Musculoskeletal: No clubbing, cyanosis or edema bilaterally. Full range of motion without deformity. Skin: Skin color, texture, turgor normal.  No rashes or lesions. Neurologic:  Neurovascularly intact without any focal sensory/motor deficits. Cranial nerves: II-XII intact, grossly non-focal. Gait not assessed.   Psychiatric: Alert and oriented, thought content appropriate, normal insight  Capillary Refill: Brisk,3 seconds, normal   Peripheral Pulses: +2 palpable, equal bilaterally       Labs:   Recent Labs     08/22/22  0643   WBC 6.8   HGB 11.9*   HCT 37.3        Recent Labs     08/22/22  0642 08/22/22  1753 08/23/22  0614     --  138   K 3.1* 4.2 3.9     --  105   CO2 29  --  25   BUN 18  --  19   CREATININE 0.8 --  0.8   CALCIUM 9.2  --  9.2   PHOS 3.6  --   --      Recent Labs     08/22/22  0642   AST 14*   ALT 13   BILITOT 0.4   ALKPHOS 88     No results for input(s): INR in the last 72 hours. No results for input(s): Wayna Khat in the last 72 hours. Urinalysis:      Lab Results   Component Value Date/Time    NITRU POSITIVE 08/19/2022 11:24 AM    WBCUA 24 08/19/2022 11:24 AM    BACTERIA 4+ 08/19/2022 11:24 AM    RBCUA 3 08/19/2022 11:24 AM    BLOODU TRACE 08/19/2022 11:24 AM    SPECGRAV 1.010 08/19/2022 11:24 AM    GLUCOSEU Negative 08/19/2022 11:24 AM       Radiology:  CT HEAD WO CONTRAST   Final Result   No acute intracranial abnormality. Diffuse atrophic changes with findings suggesting chronic microvascular   ischemia         XR CHEST PORTABLE   Final Result   No significant findings in the chest.                 Assessment/Plan:    Active Hospital Problems    Diagnosis     UTI (urinary tract infection) [N39.0]      Priority: Medium    Hypokalemia [E87.6]      Priority: Medium    Hypomagnesemia [E83.42]      Priority: Medium    Dizziness [R42]      Priority: Medium    HTN (hypertension) [I10]     Type 2 diabetes mellitus with neurologic complication, with long-term current use of insulin (HCC) [E11.49, Z79.4]      Dizziness with gait instability:  Patient with history of CVA/TIAs. Recent left hemispheric CVA. Recently worked up at [facility] with MRI head, CT head, CT angio head and neck. Repeat head CT without acute intracranial abnormality. Neurology consult appreciated. No further work up recommended. No further imaging recommended at this time. Continued prn Meclizine and vestibular vertigo. PT OT recommended SNF but patient declined. No events on TELE. On statin and Plavix. TSH, B 12 and folate wnl. Hypertension:   Monitor BP on Lasix, amlodipine and lisinopril. T2DM on long-term insulin with Neuropathy:  A1c 7.6% on this admission.    Resumed Basal Insulin and Metformin on discharge. Continue basal with correctional dose insulin as inpatient. Hypokalemia and magnesium deficiency: Replaced. UTI: Completed course of cefdinir. DVT Prophylaxis: Lovenox  Diet: ADULT DIET; Regular; 4 carb choices (60 gm/meal); Low Sodium (2 gm)  Code Status: DNR-CCA    PT/OT Eval Status: SNF recommended patient refused. Dispo -Home with outpatient services. Patient appealing discharge.       Catia Peraza MD

## 2022-08-24 NOTE — PROGRESS NOTES
[address] [facility],[address] Msb Department   Phone: [phone]    Occupational Therapy    [] Initial Evaluation            [x] Daily Treatment Note         [] Discharge Summary      Patient: Marie Gary Saturday   : 1945   MRN: [id number]   Date of Service:  2022    Admitting Diagnosis:  Dizziness  1. Dizziness    2. Acute cystitis without hematuria    3. Unable to ambulate      Current Admission Summary:  Marie Gary magy is a 65 y.o. 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 She was recently admitted to [facility] on 2022 and treated for TIA and UTI and discharged on 2022 to [facility]. She had presented at that time with generalized weakness and inability to hold her head up. Work-up done at that time including MRI brain showed no acute infarction but multiple remote infarcts diffuse cerebral atrophy, micro hemorrhages likely associated with chronic hypertension. Past Medical History:  has a past medical history of Hypertension and Stroke (cerebrum) ([facility] [address] Past Surgical History:  has a past surgical history that includes Cholecystectomy. Discharge Recommendations: Marie Gary Saturday scored a 18/24 on the AM-PAC ADL Inpatient form. Current research shows that an AM-PAC score of 17 or less is typically not associated with a discharge to the patient's home setting. Based on the patient's AM-PAC score and their current ADL deficits, it is recommended that the patient have 3-5 sessions per week of Occupational Therapy at d/c to increase the patient's independence. Please see assessment section for further patient specific details. Patient reports she will not have assist during the day because her son and her grandsons are out of the house.   Patient feels like in order to be safe she should return to skilled in patient rehabilitation for safety. If patient discharges prior to next session this note will serve as a discharge summary. Please see below for the latest assessment towards goals. DME Required For Discharge: standard walker  Patient demonstrated improved balance and safety with use of sw due to impaired lower body strength     Precautions/Restrictions: high fall risk  Weight Bearing Restrictions: weight bearing as tolerated  [] Right Upper Extremity  [] Left Upper Extremity [] Right Lower Extremity  [] Left Lower Extremity     Required Braces/Orthotics: no braces required   [] Right  [] Left  Positional Restrictions:no positional restrictions      Pre-Admission Information Lives With: Family (2 sons and grand son   Type of Home: House  Home Layout: One level, Laundry in basement  Home Access:  ramp to enter  Entrance Stairs - 0  Bathroom Shower/Tub: Walk-in shower, Shower chair without back patient reports she washes a sponge bath, was at [facility] 3 weeks, patient has been home from [employer] one week. Bathroom Toilet: Standard  Bathroom Accessibility: Not accessible  Home Equipment: rollater, patient reports she was mostly rolling it with her feet.    Receives Help From: Family assisted with self care, she reports she was able to scoot around on rollater and complete dishes   ADL Assistance:  family reports she can complete a bath , used rollator and was able to get into bathroom and complete toile ting and clothing management   Homemaking Assistance: Needs assistance (son helps with laundry, cooking, cleaning)  Ambulation Assistance: Independent (uses RW as needed)Rolator   Transfer Assistance: Independent  Active : No (pt reports no driving in the last year)  Additional Comments: pt reports no falls in the last 6 months          Examination     Vision:   Vision Gross Assessment: WFL  Hearing:   WFL  Perception:   Overall Perceptual Status: Impaired Bilaterally due to neuropathy  Observation:   Edema: Edema located in (B) LE. Edema Level: 1+: trace with minimal depression  Posture:   Slight flexion during ambulation   Sensation:   reports numbness and tingling in all extremities and reduced stereognosis to all extremities  Proprioception:    diminished proprioception in all extremities  Tone:   Normotonic  Coordination Testing:   WFL    ROM:   (B) UE AROM WFL  Strength:   (B) UE strength grossly WFL    Decision Making: medium complexity  Clinical Presentation: stable      Subjective    General: Patient just finished lunch and agreeable to ADL / transfer training with OT  Pain: 0/10  Pain Interventions: not applicable           Activities of Daily Living    Basic Activities of Daily Living  Grooming: setup assistance supervision  Upper Extremity Bathing: setup assistance stand by assistance contact guard assistance  Upper Extremity Dressing: setup assistance  Lower Extremity Dressing: minimal assistance  Pt completed UB and LB ADLs seated in bedside chair, set up supervision washing face and UB. Min assist LB dress, and set up with UB dress. Instrumental Activities of Daily Living  No IADL completed on this date. Functional Mobility    Bed Mobility  Bed mobility not completed on this date. Comments:  Transfers  Sit to stand transfer:contact guard assistance  Stand to sit transfer: contact guard assistance  Comments: Pt sit to stand, stand to sit transfers x 4 trials with cg, using r.walker and gait belt for safety  Functional Mobility:  Standing Balance: contact guard assistance.   Static standing balance cg with R. walker    Other Therapeutic Interventions      Functional Outcomes  AM-PAC Inpatient Daily Activity Raw Score: 18      Cognition  WFL  Orientation:    alert and oriented x 4  Command Following:   [facility]     Education    Barriers To Learning: none  Patient Education: patient educated on goals, OT role and benefits, plan of care, discharge recommendations  Learning Assessment:  patient verbalizes and demonstrates understanding    Assessment    Activity Tolerance: good  Impairments Requiring Therapeutic Intervention: decreased functional mobility, decreased ADL status, decreased endurance, decreased sensation, decreased balance, decreased IADL  Prognosis: good  Clinical Assessment:  Patient demonstrates impaired balance and activity tolerance in order to complete self care and IADL independently   Safety Interventions: patient left in chair, chair alarm in place, call light within reach, patient at risk for falls, and nurse notified       Plan    Frequency: 3-5 x/per week  Current Treatment Recommendations: balance training, functional mobility training, transfer training, neuromuscular re-education, ADL/self-care training, IADL training, and home management training    Goals    Patient Goals: Patient goal is to return home after short rehab stay     Short Term Goals:  Time Frame: discharge  Patient will complete upper body ADL at stand by assistance   Patient will complete lower body ADL at stand by assistance   Patient will complete toileting at stand by assistance   Patient will complete self-feeding at modified independent   Patient will complete grooming at modified independent   Patient will complete functional transfers at stand by assistance   Patient will complete functional mobility at stand by assistance   Patient will complete bed mobility at stand by assistance   Patient to gather and transport IADL items at stand by assistance   Continue goals 8/24     Therapy Session Time     Individual Group Co-treatment   Time In 1340      Time Out 1407       Minutes 27           Timed Code Treatment Minutes:  27  Total Treatment Minutes:  27    Electronically Signed By: NAJMA Vang/JONO [id number]

## 2022-08-24 NOTE — CARE COORDINATION
08/24/22 1622   Documentation for Discharge Appeal   Date Notified of Outcome 08/24/22   Time Notified of Outcome 1224   Outcome of appeal Agree with planned discharge       [facility] has notified patient of agreement with current DC plan. Hospital liability starts 08/25/2022 at noon.

## 2022-08-24 NOTE — PROGRESS NOTES
Shift assessment completed. Routine vitals obtained. Scheduled medications given. Patient does not appear to be in distress, resting comfortably at this time. Pt is currently sitting up in chair, watching television. Call light within reach. Fall precautions in place. No further needs expressed.

## 2022-08-24 NOTE — PROGRESS NOTES
Occupational Therapy  Namoie Saturday   Attempted pt for OT treatment, pt just received lunch and requested OT to return later. Will follow up if time permits.    Agatha Wen, OTR/L [id number]

## 2022-08-24 NOTE — CARE COORDINATION
Patient appealed discharge and was denied. CM had conversation with patient to inform her of the time frame that she will need to discharge from the hospital tomorrow 8/25. Patient states understanding and that she will have her daughter pick her up before noon. Patient agreeable and signed Noncovered Continued Stay form . CM updated charge nurse on conversation. CM contacted [address] to inform them of patient intended discharge tomorrow before noon. Representative from [facility] states that they will contact patient tomorrow.      [address]:  [phone]  Orders may be faxed to:  [phone]    Electronically signed by Chuckie Solomon RN on 8/24/2022 at 2:11 PM

## 2022-08-24 NOTE — PROGRESS NOTES
[facility]   Phone: [phone]    Physical Therapy    [] Initial Evaluation            [x] Daily Treatment Note         [] Discharge Summary      Patient: Katy Neff Saturday   : 1945   MRN: [id number]   Date of Service:  2022  Admitting Diagnosis: Dizziness  Current Admission Summary: Katy Vasquez is a 65 y.o. female who presents to the emergency department complaining of dizziness and headache on and off for 1 month. Patient was seen at [facility] and admitted. She had a CT scan of the head, CTA head and neck and MRI. There was no acute infarction noted. Patient had numerous remote microhemorrhages associated with chronic hypertension. The patient states that she was transferred to [facility] for physical therapy. She states that she has chronic diabetic neuropathy which does affect her walking as well. She feels as though her dizziness was not fully addressed or managed well at the physical therapy therefore requested to go home. She has been home since Saturday. She states that she is still feeling persistently dizzy with head movement. When she keeps her head still she denies any dizziness. She does report a mild generalized headache. Past Medical History:  has a past medical history of Hypertension and Stroke (cerebrum) ([address] Past Surgical History:  has a past surgical history that includes Cholecystectomy. Discharge Recommendations: Katy Neff Saturday scored a 15/24 on the AM-PAC short mobility form. Current research shows that an AM-PAC score of 17 or less is typically not associated with a discharge to the patient's home setting. Based on the patient's AM-PAC score and their current functional mobility deficits, it is recommended that the patient have 3-5 sessions per week of Physical Therapy at d/c to increase the patient's independence.   Please see assessment section for further patient specific details. If patient discharges prior to next session this note will serve as a discharge summary. Please see below for the latest assessment towards goals. DME Required For Discharge: patient has all required DME for discharge  Precautions/Restrictions: high fall risk  Weight Bearing Restrictions: no restrictions  [] Right Upper Extremity  [] Left Upper Extremity [] Right Lower Extremity  [] Left Lower Extremity     Required Braces/Orthotics: no braces required   [] Right  [] Left  Positional Restrictions:no positional restrictions    Pre-Admission Information   Lives With: son, grandchildren    Type of Home: house  Home Layout: one level, laundry in basement  Home Access: ramped entry  Bathroom Layout: walker accessible, walk in shower  Bathroom Equipment: . Comment: none pt does sponge baths  Toilet Height: standard height  Home Equipment: rolling walker, rollator - 4 wheeled walker, manual wheelchair  Transfer Assistance: modified independent with use of 4ww  Ambulation Assistance:modified independent with use of 4ww  ADL Assistance: independent with all ADL's  IADL Assistance: requires assistance with laundry  Active :        [] Yes  [x] No  Hand Dominance: [x] Left  [] Right  Current Employment: retired. Occupation: worked for [employer]   Recent Falls: Pt has had no falls in last 6 months per her report      Subjective  General: Pt up in room alone upon arrival, seemingly having come from restroom. Upon PT entrance, pt stating \"Where'd that girl go? \" Upon inquiry, pt reporting PCA left to get pt new brief and pt independently attempting to return to recliner. Pt significantly unsteady and PT assisting pt to recliner, PCA then returning with brief. PCA and PT reinforcing need to alert staff if attempting to stand/ambulate. Pain: 4/10. Location: pt reporting a \"catch\" on L side, just below ribs/breast. Pt also reporting pain in L knee that began just today this afternoon.  Pt later reporting that L knee pain started when she woke up this morning. Pain Interventions: not applicable       Functional Mobility  Bed Mobility  Bed mobility not completed on this date. Comments: Pt up in room upon arrival and left in recliner at end of session. Transfers  Sit to stand transfer: moderate assistance  Stand to sit transfer: minimal assistance  Stand step transfer: minimal assistance (stepping transfer completed from   Comments:     Ambulation  Surface:level surface  Assistive Device: rolling walker  Assistance: minimal assistance  Distance: ~25 ft x 2  Gait Mechanics: slow jessie, decreased step height and length, ELLIS wide, unsteady, decreased foot proprioception secondary to neuropathy, antalgic gait on LLE. Comments:  Initial ambulation with hospital socks. Pt ambulating as noted above, reporting increased pain in \"catch\" on L lateral chest/rib. On second bout, PT assisting pt to don shoes to assist with neuropathy. Pt then with increased antalgic gait on L, though reporting due to L knee pain this attempt. Stair Mobility  Stair mobility not completed on this date. Comments:  Wheelchair Mobility:  No w/c mobility completed on this date. Comments:  Balance  Static Sitting Balance: good: independent with functional balance in unsupported position  Dynamic Sitting Balance: good: independent with functional balance in unsupported position  Static Standing Balance: fair (-): maintains balance at min A with use of UE support  Dynamic Standing Balance: fair (-): maintains balance at min A with use of UE support  Comments:    Other Therapeutic Interventions  B LAQ x 10. B marching x 20. B heel raises x 15. Pt assessing L knee pain as pt unclear in description of pain, unclear if more soreness or sharp. Joint approximation with no increase in pain. Pt reporting pain at end range of both flexion/extension, though initial assessment with onset of pain prior to full end range with empty end feel.  No audible crepitus noted, nor increased muscular restriction. Difficult to assess true PROM as pt unable to relax quadriceps fully despite multiple cues. RN notified. Functional Outcomes  AM-PAC Inpatient Mobility Raw Score : 15              Cognition  WFL  Orientation:    alert and oriented x 4  Command Following:   [facility]  Barriers To Learning: none  Patient Education: patient educated on goals, PT role and benefits, plan of care, general safety, discharge recommendations  Learning Assessment:  patient verbalizes understanding, would benefit from continued reinforcement    Assessment  Activity Tolerance: fair, fatigues quickly  Impairments Requiring Therapeutic Intervention: decreased functional mobility, decreased ADL status, decreased safety awareness, decreased endurance, decreased balance, vestibular impairment, decreased posture  Prognosis: good  Clinical Assessment: Pt presents with deficits in her mobility that are below her baseline level. Pt needing moderate assistance to stand and having difficulty walking hospital room secondary to neuropathy in feet. Minor dizzy reports. Unsafe to discharge home though pt does not want to return to SNF. Continued skilled PT to promote improvements in functional mobility.    Safety Interventions: patient left in chair, chair alarm in place, call light within reach, gait belt, patient at risk for falls, and nurse notified    Plan  Frequency: 3-5 x/per week  Current Treatment Recommendations: strengthening, balance training, functional mobility training, transfer training, gait training, stair training, endurance training, neuromuscular re-education, home exercise program, safety education, positioning, and vestibular rehab    Goals  Patient Goals: to go home and get my head straight   Short Term Goals:  Time Frame: upon dc  Patient will complete bed mobility at Independent   Patient will complete transfers at modified independent   Patient will ambulate 100 ft with use of rollator (4WRW) at [facility] independent  Patient to maintain standing at [facility] for 5 minutes and be able to turn head without dizziness    No goals met 8/24    Therapy Session Time      Individual Group Co-treatment   Time In  1512       Time Out  1542       Minutes  30         Timed Code Treatment Minutes:   30  Total Treatment Minutes:  30       Electronically Signed By: Rona Mo, PT  Rona Mo PT, DPT, [id number]

## 2022-08-25 VITALS
OXYGEN SATURATION: 97 % | WEIGHT: 173.5 LBS | SYSTOLIC BLOOD PRESSURE: 144 MMHG | HEART RATE: 87 BPM | BODY MASS INDEX: 28.91 KG/M2 | RESPIRATION RATE: 17 BRPM | TEMPERATURE: 98.4 F | HEIGHT: 65 IN | DIASTOLIC BLOOD PRESSURE: 82 MMHG

## 2022-08-25 LAB
GLUCOSE BLD-MCNC: 64 MG/DL (ref 70–99)
GLUCOSE BLD-MCNC: 65 MG/DL (ref 70–99)
GLUCOSE BLD-MCNC: 92 MG/DL (ref 70–99)
PERFORMED ON: ABNORMAL
PERFORMED ON: ABNORMAL
PERFORMED ON: NORMAL

## 2022-08-25 PROCEDURE — 6370000000 HC RX 637 (ALT 250 FOR IP): Performed by: INTERNAL MEDICINE

## 2022-08-25 PROCEDURE — 2580000003 HC RX 258: Performed by: INTERNAL MEDICINE

## 2022-08-25 RX ORDER — INSULIN GLARGINE 100 [IU]/ML
20 INJECTION, SOLUTION SUBCUTANEOUS NIGHTLY
Status: DISCONTINUED | OUTPATIENT
Start: 2022-08-25 | End: 2022-08-25 | Stop reason: HOSPADM

## 2022-08-25 RX ADMIN — GABAPENTIN 300 MG: 300 CAPSULE ORAL at 08:36

## 2022-08-25 RX ADMIN — MECLIZINE 25 MG: 12.5 TABLET ORAL at 08:36

## 2022-08-25 RX ADMIN — Medication 10 ML: at 08:35

## 2022-08-25 RX ADMIN — BUSPIRONE HYDROCHLORIDE 7.5 MG: 5 TABLET ORAL at 08:36

## 2022-08-25 RX ADMIN — LISINOPRIL 10 MG: 10 TABLET ORAL at 08:36

## 2022-08-25 RX ADMIN — CLOPIDOGREL BISULFATE 75 MG: 75 TABLET ORAL at 08:35

## 2022-08-25 ASSESSMENT — PAIN SCALES - GENERAL
PAINLEVEL_OUTOF10: 0
PAINLEVEL_OUTOF10: 0

## 2022-08-25 NOTE — PROGRESS NOTES
Vital signs stable. Patient denies any needs. Call light is within reach and fall precautions are in place.

## 2022-08-25 NOTE — ACP (ADVANCE CARE PLANNING)
Advanced Care Planning Note. Purpose of Encounter: Advanced care planning in light of history of stroke  Parties In Attendance: Patient  Decisional Capacity: Yes  Subjective: Patient is weak and tired  Objective: FS 92   Goals of Care Determination: Patient wants limited support (No CPR, short vent, no HD, consider surgery, no trach, no PEG)  Plan:  PT/OT. Abx for UTI. Home care  Code Status: DNR CCA   Time spent on Advanced care Plannin minutes  Advanced Care Planning Documents: Completed advanced directives on chart, sister is the POA.     Enedina Jain MD  2022 5:58 PM

## 2022-08-25 NOTE — PLAN OF CARE
Problem: Safety - Adult  Goal: Free from fall injury  Outcome: Adequate for Discharge     Problem: Chronic Conditions and Co-morbidities  Goal: Patient's chronic conditions and co-morbidity symptoms are monitored and maintained or improved  Outcome: Adequate for Discharge     Problem: Skin/Tissue Integrity  Goal: Absence of new skin breakdown  Description: 1. Monitor for areas of redness and/or skin breakdown  2. Assess vascular access sites hourly  3. Every 4-6 hours minimum:  Change oxygen saturation probe site  4. Every 4-6 hours:  If on nasal continuous positive airway pressure, respiratory therapy assess nares and determine need for appliance change or resting period.   Outcome: Adequate for Discharge     Problem: ABCDS Injury Assessment  Goal: Absence of physical injury  Outcome: Adequate for Discharge

## 2022-08-25 NOTE — PROGRESS NOTES
Tele and SL removed. Granddaughter here to  pt. Discharge instructions reviewed with granddaughter, no questions verbalized. Pt escorted via wheelchair by nursing staff to private vehicle.

## 2022-09-21 PROBLEM — N39.0 UTI (URINARY TRACT INFECTION): Status: RESOLVED | Noted: 2022-08-22 | Resolved: 2022-09-21

## 2022-11-02 ENCOUNTER — HOSPITAL ENCOUNTER (INPATIENT)
Age: 77
LOS: 4 days | Discharge: NURSING, REHAB OR LONG-TERM CARE | DRG: 683 | End: 2022-11-06
Attending: EMERGENCY MEDICINE | Admitting: HOSPITALIST
Payer: MEDICARE

## 2022-11-02 DIAGNOSIS — N30.00 ACUTE CYSTITIS WITHOUT HEMATURIA: ICD-10-CM

## 2022-11-02 DIAGNOSIS — N17.9 AKI (ACUTE KIDNEY INJURY) (HCC): ICD-10-CM

## 2022-11-02 DIAGNOSIS — E11.65 HYPERGLYCEMIA DUE TO DIABETES MELLITUS (HCC): Primary | ICD-10-CM

## 2022-11-02 DIAGNOSIS — R42 DIZZINESS: ICD-10-CM

## 2022-11-02 PROBLEM — N39.0 UTI (URINARY TRACT INFECTION): Status: ACTIVE | Noted: 2022-11-02

## 2022-11-02 LAB
A/G RATIO: 0.7 (ref 1.1–2.2)
ALBUMIN SERPL-MCNC: 3.3 G/DL (ref 3.4–5)
ALP BLD-CCNC: 85 U/L (ref 40–129)
ALT SERPL-CCNC: 10 U/L (ref 10–40)
AMORPHOUS: PRESENT
ANION GAP SERPL CALCULATED.3IONS-SCNC: 12 MMOL/L (ref 3–16)
AST SERPL-CCNC: 10 U/L (ref 15–37)
BACTERIA: ABNORMAL /HPF
BASOPHILS ABSOLUTE: 0.1 K/UL (ref 0–0.2)
BASOPHILS RELATIVE PERCENT: 0.8 %
BILIRUB SERPL-MCNC: 0.3 MG/DL (ref 0–1)
BILIRUBIN URINE: ABNORMAL
BLOOD, URINE: ABNORMAL
BUN BLDV-MCNC: 47 MG/DL (ref 7–20)
CALCIUM SERPL-MCNC: 9.7 MG/DL (ref 8.3–10.6)
CHLORIDE BLD-SCNC: 97 MMOL/L (ref 99–110)
CLARITY: ABNORMAL
CO2: 27 MMOL/L (ref 21–32)
COLOR: ABNORMAL
CREAT SERPL-MCNC: 1.6 MG/DL (ref 0.6–1.2)
EOSINOPHILS ABSOLUTE: 0 K/UL (ref 0–0.6)
EOSINOPHILS RELATIVE PERCENT: 0.5 %
EPITHELIAL CELLS, UA: 9 /HPF (ref 0–5)
GFR SERPL CREATININE-BSD FRML MDRD: 33 ML/MIN/{1.73_M2}
GLUCOSE BLD-MCNC: 177 MG/DL (ref 70–99)
GLUCOSE BLD-MCNC: 222 MG/DL (ref 70–99)
GLUCOSE BLD-MCNC: 231 MG/DL
GLUCOSE BLD-MCNC: 231 MG/DL (ref 70–99)
GLUCOSE BLD-MCNC: 247 MG/DL (ref 70–99)
GLUCOSE URINE: NEGATIVE MG/DL
HCT VFR BLD CALC: 32.2 % (ref 36–48)
HEMOGLOBIN: 10.5 G/DL (ref 12–16)
HYALINE CASTS: 54 /LPF (ref 0–8)
KETONES, URINE: ABNORMAL MG/DL
LEUKOCYTE ESTERASE, URINE: ABNORMAL
LYMPHOCYTES ABSOLUTE: 2.4 K/UL (ref 1–5.1)
LYMPHOCYTES RELATIVE PERCENT: 26.6 %
MCH RBC QN AUTO: 25.8 PG (ref 26–34)
MCHC RBC AUTO-ENTMCNC: 32.7 G/DL (ref 31–36)
MCV RBC AUTO: 79 FL (ref 80–100)
MICROSCOPIC EXAMINATION: YES
MONOCYTES ABSOLUTE: 0.5 K/UL (ref 0–1.3)
MONOCYTES RELATIVE PERCENT: 6 %
NEUTROPHILS ABSOLUTE: 5.9 K/UL (ref 1.7–7.7)
NEUTROPHILS RELATIVE PERCENT: 66.1 %
NITRITE, URINE: NEGATIVE
PDW BLD-RTO: 13.8 % (ref 12.4–15.4)
PERFORMED ON: ABNORMAL
PH UA: 5 (ref 5–8)
PLATELET # BLD: 367 K/UL (ref 135–450)
PMV BLD AUTO: 7.5 FL (ref 5–10.5)
POTASSIUM REFLEX MAGNESIUM: 3.8 MMOL/L (ref 3.5–5.1)
PROTEIN UA: 100 MG/DL
RBC # BLD: 4.08 M/UL (ref 4–5.2)
RBC UA: 2 /HPF (ref 0–4)
SODIUM BLD-SCNC: 136 MMOL/L (ref 136–145)
SPECIFIC GRAVITY UA: 1.02 (ref 1–1.03)
TOTAL PROTEIN: 8.2 G/DL (ref 6.4–8.2)
URINE REFLEX TO CULTURE: YES
URINE TYPE: ABNORMAL
UROBILINOGEN, URINE: 1 E.U./DL
WBC # BLD: 9 K/UL (ref 4–11)
WBC UA: 162 /HPF (ref 0–5)

## 2022-11-02 PROCEDURE — 85025 COMPLETE CBC W/AUTO DIFF WBC: CPT

## 2022-11-02 PROCEDURE — 6360000002 HC RX W HCPCS: Performed by: EMERGENCY MEDICINE

## 2022-11-02 PROCEDURE — 2580000003 HC RX 258: Performed by: PHYSICIAN ASSISTANT

## 2022-11-02 PROCEDURE — 87077 CULTURE AEROBIC IDENTIFY: CPT

## 2022-11-02 PROCEDURE — 2580000003 HC RX 258: Performed by: EMERGENCY MEDICINE

## 2022-11-02 PROCEDURE — 99285 EMERGENCY DEPT VISIT HI MDM: CPT

## 2022-11-02 PROCEDURE — 83036 HEMOGLOBIN GLYCOSYLATED A1C: CPT

## 2022-11-02 PROCEDURE — 87086 URINE CULTURE/COLONY COUNT: CPT

## 2022-11-02 PROCEDURE — 87040 BLOOD CULTURE FOR BACTERIA: CPT

## 2022-11-02 PROCEDURE — 87186 SC STD MICRODIL/AGAR DIL: CPT

## 2022-11-02 PROCEDURE — 6360000002 HC RX W HCPCS: Performed by: HOSPITALIST

## 2022-11-02 PROCEDURE — 2580000003 HC RX 258: Performed by: HOSPITALIST

## 2022-11-02 PROCEDURE — 87150 DNA/RNA AMPLIFIED PROBE: CPT

## 2022-11-02 PROCEDURE — 6370000000 HC RX 637 (ALT 250 FOR IP): Performed by: EMERGENCY MEDICINE

## 2022-11-02 PROCEDURE — 80053 COMPREHEN METABOLIC PANEL: CPT

## 2022-11-02 PROCEDURE — 36415 COLL VENOUS BLD VENIPUNCTURE: CPT

## 2022-11-02 PROCEDURE — 96374 THER/PROPH/DIAG INJ IV PUSH: CPT

## 2022-11-02 PROCEDURE — 2580000003 HC RX 258: Performed by: INTERNAL MEDICINE

## 2022-11-02 PROCEDURE — 81001 URINALYSIS AUTO W/SCOPE: CPT

## 2022-11-02 PROCEDURE — 99223 1ST HOSP IP/OBS HIGH 75: CPT | Performed by: INTERNAL MEDICINE

## 2022-11-02 PROCEDURE — 6370000000 HC RX 637 (ALT 250 FOR IP): Performed by: HOSPITALIST

## 2022-11-02 PROCEDURE — 96361 HYDRATE IV INFUSION ADD-ON: CPT

## 2022-11-02 PROCEDURE — 1200000000 HC SEMI PRIVATE

## 2022-11-02 RX ORDER — INSULIN LISPRO 100 [IU]/ML
0-8 INJECTION, SOLUTION INTRAVENOUS; SUBCUTANEOUS
Status: DISCONTINUED | OUTPATIENT
Start: 2022-11-02 | End: 2022-11-06 | Stop reason: HOSPADM

## 2022-11-02 RX ORDER — DULOXETIN HYDROCHLORIDE 20 MG/1
20 CAPSULE, DELAYED RELEASE ORAL DAILY
COMMUNITY

## 2022-11-02 RX ORDER — POLYETHYLENE GLYCOL 3350 17 G/17G
17 POWDER, FOR SOLUTION ORAL DAILY PRN
Status: DISCONTINUED | OUTPATIENT
Start: 2022-11-02 | End: 2022-11-06 | Stop reason: HOSPADM

## 2022-11-02 RX ORDER — SODIUM CHLORIDE 9 MG/ML
INJECTION, SOLUTION INTRAVENOUS PRN
Status: DISCONTINUED | OUTPATIENT
Start: 2022-11-02 | End: 2022-11-06 | Stop reason: HOSPADM

## 2022-11-02 RX ORDER — ATORVASTATIN CALCIUM 40 MG/1
40 TABLET, FILM COATED ORAL NIGHTLY
Status: DISCONTINUED | OUTPATIENT
Start: 2022-11-02 | End: 2022-11-06 | Stop reason: HOSPADM

## 2022-11-02 RX ORDER — GABAPENTIN 300 MG/1
300 CAPSULE ORAL 2 TIMES DAILY
Status: DISCONTINUED | OUTPATIENT
Start: 2022-11-02 | End: 2022-11-06 | Stop reason: HOSPADM

## 2022-11-02 RX ORDER — SODIUM CHLORIDE 9 MG/ML
INJECTION, SOLUTION INTRAVENOUS CONTINUOUS
Status: DISCONTINUED | OUTPATIENT
Start: 2022-11-02 | End: 2022-11-02

## 2022-11-02 RX ORDER — ACETAMINOPHEN 325 MG/1
650 TABLET ORAL EVERY 6 HOURS PRN
Status: DISCONTINUED | OUTPATIENT
Start: 2022-11-02 | End: 2022-11-06 | Stop reason: HOSPADM

## 2022-11-02 RX ORDER — INSULIN GLARGINE 100 [IU]/ML
20 INJECTION, SOLUTION SUBCUTANEOUS NIGHTLY
Status: DISCONTINUED | OUTPATIENT
Start: 2022-11-02 | End: 2022-11-06 | Stop reason: HOSPADM

## 2022-11-02 RX ORDER — CLOPIDOGREL BISULFATE 75 MG/1
75 TABLET ORAL DAILY
Status: DISCONTINUED | OUTPATIENT
Start: 2022-11-03 | End: 2022-11-06 | Stop reason: HOSPADM

## 2022-11-02 RX ORDER — ACETAMINOPHEN 650 MG/1
650 SUPPOSITORY RECTAL EVERY 6 HOURS PRN
Status: DISCONTINUED | OUTPATIENT
Start: 2022-11-02 | End: 2022-11-06 | Stop reason: HOSPADM

## 2022-11-02 RX ORDER — SODIUM CHLORIDE 0.9 % (FLUSH) 0.9 %
5-40 SYRINGE (ML) INJECTION PRN
Status: DISCONTINUED | OUTPATIENT
Start: 2022-11-02 | End: 2022-11-06 | Stop reason: HOSPADM

## 2022-11-02 RX ORDER — DEXTROSE MONOHYDRATE 100 MG/ML
INJECTION, SOLUTION INTRAVENOUS CONTINUOUS PRN
Status: DISCONTINUED | OUTPATIENT
Start: 2022-11-02 | End: 2022-11-06 | Stop reason: HOSPADM

## 2022-11-02 RX ORDER — ONDANSETRON 2 MG/ML
4 INJECTION INTRAMUSCULAR; INTRAVENOUS EVERY 6 HOURS PRN
Status: DISCONTINUED | OUTPATIENT
Start: 2022-11-02 | End: 2022-11-06 | Stop reason: HOSPADM

## 2022-11-02 RX ORDER — MECLIZINE HCL 12.5 MG/1
25 TABLET ORAL ONCE
Status: COMPLETED | OUTPATIENT
Start: 2022-11-02 | End: 2022-11-02

## 2022-11-02 RX ORDER — SODIUM CHLORIDE 9 MG/ML
INJECTION, SOLUTION INTRAVENOUS CONTINUOUS
Status: DISCONTINUED | OUTPATIENT
Start: 2022-11-02 | End: 2022-11-03

## 2022-11-02 RX ORDER — BUSPIRONE HYDROCHLORIDE 7.5 MG/1
1 TABLET ORAL 2 TIMES DAILY
Status: DISCONTINUED | OUTPATIENT
Start: 2022-11-02 | End: 2022-11-02 | Stop reason: CLARIF

## 2022-11-02 RX ORDER — 0.9 % SODIUM CHLORIDE 0.9 %
1000 INTRAVENOUS SOLUTION INTRAVENOUS ONCE
Status: COMPLETED | OUTPATIENT
Start: 2022-11-02 | End: 2022-11-02

## 2022-11-02 RX ORDER — AMLODIPINE BESYLATE 5 MG/1
10 TABLET ORAL DAILY
Status: DISCONTINUED | OUTPATIENT
Start: 2022-11-02 | End: 2022-11-06 | Stop reason: HOSPADM

## 2022-11-02 RX ORDER — SODIUM CHLORIDE 0.9 % (FLUSH) 0.9 %
5-40 SYRINGE (ML) INJECTION EVERY 12 HOURS SCHEDULED
Status: DISCONTINUED | OUTPATIENT
Start: 2022-11-02 | End: 2022-11-06 | Stop reason: HOSPADM

## 2022-11-02 RX ORDER — INSULIN LISPRO 100 [IU]/ML
0-4 INJECTION, SOLUTION INTRAVENOUS; SUBCUTANEOUS NIGHTLY
Status: DISCONTINUED | OUTPATIENT
Start: 2022-11-02 | End: 2022-11-06 | Stop reason: HOSPADM

## 2022-11-02 RX ORDER — ONDANSETRON 4 MG/1
4 TABLET, ORALLY DISINTEGRATING ORAL EVERY 8 HOURS PRN
Status: DISCONTINUED | OUTPATIENT
Start: 2022-11-02 | End: 2022-11-06 | Stop reason: HOSPADM

## 2022-11-02 RX ORDER — ENOXAPARIN SODIUM 100 MG/ML
30 INJECTION SUBCUTANEOUS DAILY
Status: DISCONTINUED | OUTPATIENT
Start: 2022-11-02 | End: 2022-11-03

## 2022-11-02 RX ADMIN — GABAPENTIN 300 MG: 300 CAPSULE ORAL at 22:48

## 2022-11-02 RX ADMIN — AMLODIPINE BESYLATE 10 MG: 5 TABLET ORAL at 18:38

## 2022-11-02 RX ADMIN — MECLIZINE 25 MG: 12.5 TABLET ORAL at 16:06

## 2022-11-02 RX ADMIN — INSULIN LISPRO 2 UNITS: 100 INJECTION, SOLUTION INTRAVENOUS; SUBCUTANEOUS at 18:38

## 2022-11-02 RX ADMIN — Medication 10 ML: at 22:49

## 2022-11-02 RX ADMIN — ATORVASTATIN CALCIUM 40 MG: 40 TABLET, FILM COATED ORAL at 22:48

## 2022-11-02 RX ADMIN — CEFTRIAXONE SODIUM 1000 MG: 1 INJECTION, POWDER, FOR SOLUTION INTRAMUSCULAR; INTRAVENOUS at 16:08

## 2022-11-02 RX ADMIN — ENOXAPARIN SODIUM 30 MG: 100 INJECTION SUBCUTANEOUS at 18:38

## 2022-11-02 RX ADMIN — SODIUM CHLORIDE: 9 INJECTION, SOLUTION INTRAVENOUS at 16:07

## 2022-11-02 RX ADMIN — SODIUM CHLORIDE 1000 ML: 9 INJECTION, SOLUTION INTRAVENOUS at 13:08

## 2022-11-02 RX ADMIN — SODIUM CHLORIDE: 9 INJECTION, SOLUTION INTRAVENOUS at 23:01

## 2022-11-02 RX ADMIN — INSULIN GLARGINE 20 UNITS: 100 INJECTION, SOLUTION SUBCUTANEOUS at 22:48

## 2022-11-02 ASSESSMENT — ENCOUNTER SYMPTOMS
COUGH: 0
VOMITING: 0
SORE THROAT: 0
BACK PAIN: 0
CONSTIPATION: 0
DIARRHEA: 0
EYE PAIN: 0
ABDOMINAL PAIN: 0
SHORTNESS OF BREATH: 0
RHINORRHEA: 0
NAUSEA: 0

## 2022-11-02 ASSESSMENT — LIFESTYLE VARIABLES
HOW MANY STANDARD DRINKS CONTAINING ALCOHOL DO YOU HAVE ON A TYPICAL DAY: PATIENT DOES NOT DRINK
HOW OFTEN DO YOU HAVE A DRINK CONTAINING ALCOHOL: NEVER

## 2022-11-02 NOTE — ED PROVIDER NOTES
[facility]        Pt Name: Alcides Tineo Saturday  MRN: [id number]  [place] 1945  Date of evaluation: 11/2/2022  Provider: WILLIAM Cook  PCP: STACEY Major CNP  Note Started: 12:46 PM EDT       DANELLE. I have evaluated this patient. My supervising physician was available for consultation. CHIEF COMPLAINT       Chief Complaint   Patient presents with    Hyperglycemia     Pt reports blood sugar in 300. Pt does not take insulin but does take metformin. Pt is from home. Pt denies n/v. Pt reports neuropathy. HISTORY OF PRESENT ILLNESS   (Location, Timing/Onset, Context/Setting, Quality, Duration, Modifying Factors, Severity, Associated Signs and Symptoms)  Note limiting factors. Chief Complaint: Hyperglycemia    Naomie Saturday is a 65 y.o. female who presents to the emergency department due to elevated blood sugar noted this morning. Patient states that she does not take insulin but does take metformin and states that she supposed take it twice and wants to know why. And states that she took her first dose this morning but has not taken her second dose yet. Patient states that this morning she checked her blood sugar and it was around 200. She took it a few moments later and it was elevated at 300. Patient states that she has noticed more dry mouth and thirst recently. Patient states that she was recently seen in the hospital at [facility] for dizziness where she had a CT scan of the head neck and thorax which did not show any acute abnormalities. Patient states that she still has dizziness at times and states that it is related to movement of her head. Patient denies any fevers, chills, nausea, vomiting, abdominal pain, dysuria or hematuria. She does state that about a month ago she did have a urinary tract infection that was treated with antibiotics.   Patient states that she is concerned that her blood sugar keeps going up and down and wants to know why that is. Nursing Notes were all reviewed and agreed with or any disagreements were addressed in the HPI. REVIEW OF SYSTEMS    (2-9 systems for level 4, 10 or more for level 5)     Review of Systems   Constitutional:  Negative for chills, diaphoresis and fever. HENT:  Negative for congestion, rhinorrhea and sore throat. Eyes:  Negative for pain and visual disturbance. Respiratory:  Negative for cough and shortness of breath. Cardiovascular:  Negative for chest pain and leg swelling. Gastrointestinal:  Negative for abdominal pain, constipation, diarrhea, nausea and vomiting. Endocrine: Positive for polydipsia. Genitourinary:  Negative for difficulty urinating, dysuria and frequency. Musculoskeletal:  Negative for back pain and neck pain. Skin:  Negative for rash and wound. Neurological:  Positive for dizziness. Negative for light-headedness. Positives and Pertinent negatives as per HPI. Except as noted above in the ROS, all other systems were reviewed and negative. PAST MEDICAL HISTORY     Past Medical History:   Diagnosis Date    Hypertension     Stroke (cerebrum) ([facility] [address] 2001         SURGICAL HISTORY     Past Surgical History:   Procedure Laterality Date    CHOLECYSTECTOMY           CURRENTMEDICATIONS       Previous Medications    AMLODIPINE (NORVASC) 10 MG TABLET    Take 10 mg by mouth daily     ATORVASTATIN (LIPITOR) 40 MG TABLET    Take 1 tablet by mouth nightly    BUSPIRONE (BUSPAR) 7.5 MG TABLET    TAKE 1 TABLET BY MOUTH TWICE A DAY    CLOPIDOGREL (PLAVIX) 75 MG TABLET    Take 1 tablet by mouth daily    FUROSEMIDE (LASIX) 20 MG TABLET    Take 1 tablet by mouth daily as needed (For shortness of breath or leg swelling)    GABAPENTIN (NEURONTIN) 300 MG CAPSULE    Take 300 mg by mouth 2 times daily.      INSULIN GLARGINE (LANTUS;BASAGLAR) 100 UNIT/ML INJECTION PEN    Inject 20 Units into the skin nightly    LISINOPRIL (PRINIVIL;ZESTRIL) 10 MG TABLET    Take 1 tablet by mouth daily    METFORMIN (GLUCOPHAGE) 500 MG TABLET    Take 500 mg by mouth 2 times daily (with meals)     ONDANSETRON (ZOFRAN ODT) 4 MG DISINTEGRATING TABLET    Take 1 tablet by mouth every 8 hours as needed for Nausea    POTASSIUM CHLORIDE (KLOR-CON M) 10 MEQ EXTENDED RELEASE TABLET    Take 1 tablet by mouth 2 times daily         ALLERGIES     Patient has no known allergies. FAMILYHISTORY     History reviewed. No pertinent family history. SOCIAL HISTORY       Social History     Tobacco Use    Smoking status: Never    Smokeless tobacco: Never   Substance Use Topics    Alcohol use: No    Drug use: No       SCREENINGS    Hinkley Coma Scale  Eye Opening: Spontaneous  Best Verbal Response: Oriented  Best Motor Response: Obeys commands  Hinkley Coma Scale Score: 15        PHYSICAL EXAM    (up to 7 for level 4, 8 or more for level 5)     ED Triage Vitals [11/02/22 1230]   BP Temp Temp Source Heart Rate Resp SpO2 Height Weight   124/71 97.8 °F (36.6 °C) Oral 88 18 99 % -- 175 lb 11.2 oz (79.7 kg)       Physical Exam  Vitals and nursing note reviewed. Constitutional:       General: She is not in acute distress. Appearance: She is normal weight. She is not ill-appearing. HENT:      Head: Normocephalic. Mouth/Throat:      Mouth: Mucous membranes are dry. Pharynx: Oropharynx is clear. No oropharyngeal exudate or posterior oropharyngeal erythema. Eyes:      Extraocular Movements: Extraocular movements intact. Conjunctiva/sclera: Conjunctivae normal.      Pupils: Pupils are equal, round, and reactive to light. Cardiovascular:      Rate and Rhythm: Normal rate and regular rhythm. Pulses: Normal pulses. Heart sounds: Normal heart sounds. Pulmonary:      Effort: Pulmonary effort is normal. No respiratory distress. Breath sounds: Normal breath sounds. No stridor. No wheezing.    Abdominal:      General: Bowel sounds are normal. There is no distension. Palpations: There is no mass. Tenderness: There is no abdominal tenderness. Musculoskeletal:      Cervical back: Normal range of motion and neck supple. Right lower leg: No edema. Left lower leg: No edema. Neurological:      Mental Status: She is alert and oriented to person, place, and time. Psychiatric:         Mood and Affect: Mood normal.         Behavior: Behavior normal.       DIAGNOSTIC RESULTS   LABS:    Labs Reviewed   CBC WITH AUTO DIFFERENTIAL - Abnormal; Notable for the following components:       Result Value    Hemoglobin 10.5 (*)     Hematocrit 32.2 (*)     MCV 79.0 (*)     MCH 25.8 (*)     All other components within normal limits   COMPREHENSIVE METABOLIC PANEL W/ REFLEX TO MG FOR LOW K - Abnormal; Notable for the following components:    Chloride 97 (*)     Glucose 247 (*)     BUN 47 (*)     Creatinine 1.6 (*)     Est, Glom Filt Rate 33 (*)     Albumin 3.3 (*)     Albumin/Globulin Ratio 0.7 (*)     AST 10 (*)     All other components within normal limits   URINALYSIS WITH REFLEX TO CULTURE - Abnormal; Notable for the following components:    Color, UA DARK YELLOW (*)     Clarity, UA CLOUDY (*)     Bilirubin Urine SMALL (*)     Ketones, Urine TRACE (*)     Blood, Urine SMALL (*)     Protein,  (*)     Leukocyte Esterase, Urine LARGE (*)     All other components within normal limits   MICROSCOPIC URINALYSIS - Abnormal; Notable for the following components:    Hyaline Casts, UA 54 (*)     WBC,  (*)     Epithelial Cells, UA 9 (*)     Amorphous, UA Present (*)     All other components within normal limits   POCT GLUCOSE - Abnormal; Notable for the following components:    POC Glucose 231 (*)     All other components within normal limits   POCT GLUCOSE - Normal   CULTURE, URINE   CULTURE, BLOOD 1   CULTURE, BLOOD 2       When ordered only abnormal lab results are displayed.  All other labs were within normal range or not returned as of this dictation. EKG: When ordered, EKG's are interpreted by the Emergency Department Physician in the absence of a cardiologist.  Please see their note for interpretation of EKG. RADIOLOGY:   Non-plain film images such as CT, Ultrasound and MRI are read by the radiologist. Plain radiographic images are visualized and preliminarily interpreted by the ED Provider with the below findings:        Interpretation per the Radiologist below, if available at the time of this note:    No orders to display     No results found. PROCEDURES   Unless otherwise noted below, none     Procedures    CRITICAL CARE TIME       CONSULTS:  None      EMERGENCY DEPARTMENT COURSE and DIFFERENTIAL DIAGNOSIS/MDM:   Vitals:    Vitals:    11/02/22 1230 11/02/22 1400   BP: 124/71 (!) 144/75   Pulse: 88 78   Resp: 18 18   Temp: 97.8 °F (36.6 °C)    TempSrc: Oral    SpO2: 99% 97%   Weight: 175 lb 11.2 oz (79.7 kg)        Patient was given the following medications:  Medications   cefTRIAXone (ROCEPHIN) 1,000 mg in dextrose 5 % 50 mL IVPB mini-bag (has no administration in time range)   0.9 % sodium chloride infusion (has no administration in time range)   meclizine (ANTIVERT) tablet 25 mg (has no administration in time range)   0.9 % sodium chloride bolus (0 mLs IntraVENous Stopped 11/2/22 1538)         Is this patient to be included in the SEP-1 Core Measure due to severe sepsis or septic shock? No   Exclusion criteria - the patient is NOT to be included for SEP-1 Core Measure due to:  2+ SIRS criteria are not met    This is a 74-year-old female presents emergency department due to elevated blood sugar at home being around 300 this morning. Patient is is also noting to feeling dizzy and is noted to have urinary tract infection on urinalysis today. Patient's creatinine level was 0.8 at baseline and noted to be 1.6 today. Sugar on arrival was 231. Patient was not having chest pain or shortness of breath.  Patient has a reassuring neuro exam. Patient was recently seen in the hospital yesterday at [facility] with normal CT scan of the head C-spine and T-spine. Patient was given IV fluids here as well as antibiotics. Patient will be admitted to the hospital for further evaluation due to ANABEL, hyperglycemia, UTI, dizziness    FINAL IMPRESSION      1. Hyperglycemia due to diabetes mellitus ([facility] [address]    2. ANABEL (acute kidney injury) ([facility] [address]    3. Acute cystitis without hematuria    4. Dizziness          DISPOSITION/PLAN   DISPOSITION Decision To Admit 11/02/2022 03:53:43 PM      PATIENT REFERRED TO:  No follow-up provider specified.     DISCHARGE MEDICATIONS:  New Prescriptions    No medications on file       DISCONTINUED MEDICATIONS:  Discontinued Medications    No medications on file              (Please note that portions of this note were completed with a voice recognition program.  Efforts were made to edit the dictations but occasionally words are mis-transcribed.)    WILLIAM Lockett (electronically signed)            WILLIAM Lockett  11/02/22 4633

## 2022-11-02 NOTE — PROGRESS NOTES
Pt transported to floor from ED by this RN. Report received at bedside from [facility] and Andrew S Shilpa Pierre. Patient oriented to room. Head to toe assessment complete. Patient assessed. Blood sugar checked. Dinner ordered. VSS. Will report off to nightshift.

## 2022-11-02 NOTE — CONSULTS
Office : [phone]     Fax :[phone]       Nephrology Consult Note      Patient's Name: Harini Engel Saturday  5:55 PM  2022    Reason for Consult: ANABEL      Requesting Physician:  STACEY Luong CNP      Chief Complaint:    Chief Complaint   Patient presents with    Hyperglycemia     Pt reports blood sugar in 300. Pt does not take insulin but does take metformin. Pt is from home. Pt denies n/v. Pt reports neuropathy. History of Present Ilness:    Harini Engel Saturday is a 65 y.o. female with prior history of HTN, DM 2, CVA who was admitted for hyperglycemia and ANABEL. She presented with complaints of elevated blood sugar. Patient said she did not take any insulin and metformin due to not feeling well. Patient said this morning she checked her glucose it was around 200 so this afternoon rechecked again it was up to 300. Patient also complaining of dry mouth and feeling excessive thirst.  Patient was recently seen mental health for dizziness which had negative CT scan. With no acute finding. Patient says still having intermittent dizziness related to head movement. Patient denies any fevers chills no nausea vomiting does have some dysuria. Nothing that makes it better or worse. Initial blood work shows elevated creat of 1.6 . Baseline is 0.8         Prior to Admission medications    Medication Sig Start Date End Date Taking? Authorizing Provider   DULoxetine (CYMBALTA) 20 MG extended release capsule Take 20 mg by mouth daily   Yes Historical Provider, MD   empagliflozin (JARDIANCE) 10 MG tablet Take 10 mg by mouth daily Order sent to [facility] in May, but not picked up by patient.   Patient not taking: Reported on 2022   Yes Historical Provider, MD insulin glargine (LANTUS;BASAGLAR) 100 UNIT/ML injection pen Inject 20 Units into the skin nightly  Patient not taking: Reported on 11/2/2022 8/25/22   Vickie Christensen MD   furosemide (LASIX) 20 MG tablet Take 1 tablet by mouth daily as needed (For shortness of breath or leg swelling)  Patient not taking: Reported on 11/2/2022 8/24/22   Moriah Meyers MD   lisinopril (PRINIVIL;ZESTRIL) 10 MG tablet Take 1 tablet by mouth daily 8/24/22   Moriah Meyers MD   potassium chloride (KLOR-CON M) 10 MEQ extended release tablet Take 1 tablet by mouth 2 times daily  Patient not taking: Reported on 11/2/2022 5/3/54   Kacy Cheek MD   ondansetron (ZOFRAN ODT) 4 MG disintegrating tablet Take 1 tablet by mouth every 8 hours as needed for Nausea  Patient not taking: Reported on 11/2/2022 1/1/21   Beatrice Ramos PA-C   atorvastatin (LIPITOR) 40 MG tablet Take 1 tablet by mouth nightly 6/9/18   Eliu Grady MD   clopidogrel (PLAVIX) 75 MG tablet Take 1 tablet by mouth daily  Patient not taking: Reported on 11/2/2022 6/10/18   Eliu Grady MD   busPIRone (BUSPAR) 7.5 MG tablet TAKE 1 TABLET BY MOUTH TWICE A DAY  Patient not taking: Reported on 11/2/2022 10/20/17   Historical Provider, MD   metFORMIN (GLUCOPHAGE) 500 MG tablet Take 500 mg by mouth 2 times daily (with meals)     Historical Provider, MD   gabapentin (NEURONTIN) 300 MG capsule Take 300 mg by mouth 2 times daily. Historical Provider, MD   amLODIPine (NORVASC) 10 MG tablet Take 10 mg by mouth daily  4/10/14   Historical Provider, MD             No intake/output data recorded. Past Medical History:   Diagnosis Date    Diabetes mellitus ([facility] [address]     Hypertension     Hypertension     Stroke (cerebrum) ([facility] [address] 2001       Past Surgical History:   Procedure Laterality Date    CHOLECYSTECTOMY         History reviewed. No pertinent family history. reports that she has never smoked.  She has never used smokeless tobacco. She reports that she does not drink alcohol and does not use drugs. Allergies:  Patient has no known allergies. Current Medications:    0.9 % sodium chloride infusion, Continuous        Review of Systems:   14 point ROS obtained but were negative except mentioned in HPI      Physical exam:     Vitals:  /79   Pulse 88   Temp 97.8 °F (36.6 °C) (Oral)   Resp 18   Wt 175 lb 11.2 oz (79.7 kg)   SpO2 98%   BMI 29.24 kg/m²   Constitutional:  OAA X3 NAD  Skin: no rash, turgor wnl  Heent:  eomi, mmm  Neck: no bruits or jvd noted  Cardiovascular:  S1, S2 without m/r/g  Respiratory: CTA B without w/r/r  Abdomen:  +bs, soft, nt, nd  Ext: no  lower extremity edema  Psychiatric: mood and affect appropriate  Musculoskeletal:  Rom, muscular strength intact    Labs:  CBC:   Recent Labs     11/02/22  1300   WBC 9.0   HGB 10.5*        BMP:    Recent Labs     11/02/22  1259 11/02/22  1300   NA  --  136   K  --  3.8   CL  --  97*   CO2  --  27   BUN  --  47*   CREATININE  --  1.6*   GLUCOSE 231 247*     Ca/Mg/Phos:   Recent Labs     11/02/22  1300   CALCIUM 9.7     Hepatic:   Recent Labs     11/02/22  1300   AST 10*   ALT 10   BILITOT 0.3   ALKPHOS 85     Troponin: No results for input(s): TROPONINI in the last 72 hours. BNP: No results for input(s): BNP in the last 72 hours. Lipids: No results for input(s): CHOL, TRIG, HDL, LDLCALC, LABVLDL in the last 72 hours. ABGs: No results for input(s): PHART, PO2ART, WTT6BTN in the last 72 hours. INR: No results for input(s): INR in the last 72 hours.   UA:  Recent Labs     11/02/22  1418   COLORU DARK YELLOW*   CLARITYU CLOUDY*   GLUCOSEU Negative   BILIRUBINUR SMALL*   KETUA TRACE*   SPECGRAV 1.025   BLOODU SMALL*   PHUR 5.0   PROTEINU 100*   UROBILINOGEN 1.0   NITRU Negative   LEUKOCYTESUR LARGE*   LABMICR YES   URINETYPE NotGiven      Urine Microscopic:   Recent Labs     11/02/22  1418   BACTERIA None Seen   HYALCAST 54*   WBCUA 162*   RBCUA 2   EPIU 9*     Urine Culture: No results for input(s): LABURIN in the last 72 hours. Urine Chemistry: No results for input(s): Aly Leola, PROTEINUR, NAUR in the last 72 hours. IMAGING:  No orders to display                       Assessment/Plan :      1. ANABEL   Likely pre renal   Hold lasix   Hold jardiance   Iv fluids     Recommend to dose adjust all medications  based on renal functions  Maintain SBP> 90 mmHg   Daily weights   AVOID NSAIDs  Avoid Nephrotoxins  Monitor Intake/Output  Call if significant decrease in urine output      2. UTI.  Abx    Check BMP in am         D/w primary team      Thank you for allowing us to participate in care of [facility] Saturday         Electronically signed by: Imtiaz Arias MD, 11/2/2022, 5:55 PM      [facility] [address]  Office : [phone]  Fax :[phone]

## 2022-11-02 NOTE — H&P
HOSPITALISTS HISTORY AND PHYSICAL    11/2/2022 4:06 PM    Patient Information:  Sasha Ivan is a 65 y.o. female [id number]  PCP:  STACEY Wolff CNP (Tel: [phone] )    Chief complaint:    Chief Complaint   Patient presents with    Hyperglycemia     Pt reports blood sugar in 300. Pt does not take insulin but does take metformin. Pt is from home. Pt denies n/v. Pt reports neuropathy. History of Present Illness:  August Outlaw Saturday is a 65 y.o. female who presented with complaints of elevated blood sugar. Patient said she did not take any insulin and metformin due to not feeling well. Patient said this morning she checked her glucose it was around 200 so this afternoon rechecked again it was up to 300. Patient also complaining of dry mouth and feeling excessive thirst.  Patient was recently seen mental health for dizziness which had negative CT scan. With no acute finding. Patient says still having intermittent dizziness related to head movement. Patient denies any fevers chills no nausea vomiting does have some dysuria. Nothing that makes it better or worse. No reported fever history of UTI was recently treated about a month ago  REVIEW OF SYSTEMS:   Constitutional: Negative for fever,chills or night sweats  ENT: Negative for rhinorrhea, epistaxis, hoarseness, sore throat. Respiratory: Negative for shortness of breath,wheezing  Cardiovascular: Negative for chest pain, palpitations   Gastrointestinal: Negative for nausea, vomiting, diarrhea  Genitourinary: Negative for polyuria, dysuria   Hematologic/Lymphatic: Negative for bleeding tendency, easy bruising  Musculoskeletal: Negative for myalgias and arthralgias  Neurologic: Negative for confusion,dysarthria. Skin: Negative for itching,rash, good capillary refill. Psychiatric: Negative for depression,anxiety, agitation.   Endocrine: Negative for polydipsia,polyuria,heat [place] intolerance. Past Medical History:   has a past medical history of Hypertension and Stroke (cerebrum) ([facility] [address] Past Surgical History:   has a past surgical history that includes Cholecystectomy. Medications:  No current facility-administered medications on file prior to encounter. Current Outpatient Medications on File Prior to Encounter   Medication Sig Dispense Refill    insulin glargine (LANTUS;BASAGLAR) 100 UNIT/ML injection pen Inject 20 Units into the skin nightly 5 pen 3    furosemide (LASIX) 20 MG tablet Take 1 tablet by mouth daily as needed (For shortness of breath or leg swelling) 60 tablet 0    lisinopril (PRINIVIL;ZESTRIL) 10 MG tablet Take 1 tablet by mouth daily 30 tablet 3    potassium chloride (KLOR-CON M) 10 MEQ extended release tablet Take 1 tablet by mouth 2 times daily 180 tablet 1    ondansetron (ZOFRAN ODT) 4 MG disintegrating tablet Take 1 tablet by mouth every 8 hours as needed for Nausea 20 tablet 0    atorvastatin (LIPITOR) 40 MG tablet Take 1 tablet by mouth nightly 30 tablet 3    clopidogrel (PLAVIX) 75 MG tablet Take 1 tablet by mouth daily 30 tablet 3    busPIRone (BUSPAR) 7.5 MG tablet TAKE 1 TABLET BY MOUTH TWICE A DAY  5    metFORMIN (GLUCOPHAGE) 500 MG tablet Take 500 mg by mouth 2 times daily (with meals)       gabapentin (NEURONTIN) 300 MG capsule Take 300 mg by mouth 2 times daily. amLODIPine (NORVASC) 10 MG tablet Take 10 mg by mouth daily          Allergies:  No Known Allergies     Social History:   reports that she has never smoked. She has never used smokeless tobacco. She reports that she does not drink alcohol and does not use drugs. Family History:  family history is not on file. ,     Physical Exam:  BP (!) 144/75   Pulse 78   Temp 97.8 °F (36.6 °C) (Oral)   Resp 18   Wt 175 lb 11.2 oz (79.7 kg)   SpO2 97%   BMI 29.24 kg/m²     General appearance:  Appears comfortable.  Well nourished  Eyes: Sclera clear, pupils equal  ENT: Moist mucus membranes, no thrush. Trachea midline. Cardiovascular: Regular rhythm, normal S1, S2. No murmur, gallop, rub. No edema in lower extremities  Respiratory: Clear to auscultation bilaterally, no wheeze, good inspiratory effort  Gastrointestinal: Abdomen soft, non-tender, not distended, normal bowel sounds  Musculoskeletal: No cyanosis in digits, neck supple  Neurology: Cranial nerves grossly intact. Alert and oriented in time, place and person. No speech or motor deficits  Psychiatry: Appropriate affect. Not agitated  Skin: Warm, dry, normal turgor, no rash    Labs:  CBC:   Lab Results   Component Value Date/Time    WBC 9.0 11/02/2022 01:00 PM    RBC 4.08 11/02/2022 01:00 PM    HGB 10.5 11/02/2022 01:00 PM    HCT 32.2 11/02/2022 01:00 PM    MCV 79.0 11/02/2022 01:00 PM    MCH 25.8 11/02/2022 01:00 PM    MCHC 32.7 11/02/2022 01:00 PM    RDW 13.8 11/02/2022 01:00 PM     11/02/2022 01:00 PM    MPV 7.5 11/02/2022 01:00 PM     BMP:    Lab Results   Component Value Date/Time     11/02/2022 01:00 PM    K 3.8 11/02/2022 01:00 PM    CL 97 11/02/2022 01:00 PM    CO2 27 11/02/2022 01:00 PM    BUN 47 11/02/2022 01:00 PM    CREATININE 1.6 11/02/2022 01:00 PM    CALCIUM 9.7 11/02/2022 01:00 PM    GFRAA >60 08/23/2022 06:14 AM    LABGLOM 33 11/02/2022 01:00 PM    GLUCOSE 247 11/02/2022 01:00 PM       Chest Xray:   EKG:    I visualized CXR images and EKG strips     Discussed with     Problem List  Principal Problem:    UTI (urinary tract infection)  Resolved Problems:    * No resolved hospital problems.  *        Assessment/Plan:   Acute kidney injury  -Secondary to dehydration continue IV fluid  -Lasix which I will hold for now    UTI  -Explain patient's symptoms of weakness and dizziness  -Treated with Rocephin Minnette Sandifer, MD    11/2/2022 4:06 PM

## 2022-11-02 NOTE — PROGRESS NOTES
Patient seen in ED, room 24. Admission completed except for: 4 Eyes Assessment, Rights and Responsibilities, orientation to room, Plan of Care, education, white board, height and weight, pain assessment and head to toe assessment. Patient is alert and oriented X 4. Patient lives in a ranch style house with her son and his child and is being admitted for UTI. Home Medication Reconciliation has been verbally reviewed with patient and updated as appropriate. It continues to be marked as \"In process\" as the verification with pharmacy has not been completed. [facility] indicated that she has at least 2 prescriptions which have not been picked up and some changes of which the patient is not aware. New RX for Cymbalta 20 mg PO daily and Jardiance 10 mg PO daily. These have not been picked up and she states she is unaware of any change to Furosemide (she does not take this) or Lisinopril 40 mg tab take 1/2 tab every day. She has home services from [facility] (unsure of accurate name/spelling). All questions answered.

## 2022-11-03 ENCOUNTER — APPOINTMENT (OUTPATIENT)
Dept: MRI IMAGING | Age: 77
DRG: 683 | End: 2022-11-03
Payer: MEDICARE

## 2022-11-03 PROBLEM — B96.20 E. COLI UTI: Status: ACTIVE | Noted: 2022-11-02

## 2022-11-03 PROBLEM — N30.00 ACUTE CYSTITIS WITHOUT HEMATURIA: Status: ACTIVE | Noted: 2022-11-03

## 2022-11-03 PROBLEM — Z86.73 HISTORY OF STROKE: Status: ACTIVE | Noted: 2022-11-03

## 2022-11-03 PROBLEM — N17.9 AKI (ACUTE KIDNEY INJURY) (HCC): Status: ACTIVE | Noted: 2022-11-03

## 2022-11-03 PROBLEM — R78.81 POSITIVE BLOOD CULTURES: Status: ACTIVE | Noted: 2022-11-03

## 2022-11-03 PROBLEM — E11.65 HYPERGLYCEMIA DUE TO DIABETES MELLITUS (HCC): Status: ACTIVE | Noted: 2022-11-03

## 2022-11-03 PROBLEM — E66.3 OVERWEIGHT (BMI 25.0-29.9): Status: ACTIVE | Noted: 2022-11-03

## 2022-11-03 LAB
ANION GAP SERPL CALCULATED.3IONS-SCNC: 9 MMOL/L (ref 3–16)
BUN BLDV-MCNC: 30 MG/DL (ref 7–20)
CALCIUM SERPL-MCNC: 9.1 MG/DL (ref 8.3–10.6)
CHLORIDE BLD-SCNC: 105 MMOL/L (ref 99–110)
CO2: 26 MMOL/L (ref 21–32)
CREAT SERPL-MCNC: 0.8 MG/DL (ref 0.6–1.2)
ESTIMATED AVERAGE GLUCOSE: 226 MG/DL
GFR SERPL CREATININE-BSD FRML MDRD: >60 ML/MIN/{1.73_M2}
GLUCOSE BLD-MCNC: 121 MG/DL (ref 70–99)
GLUCOSE BLD-MCNC: 122 MG/DL (ref 70–99)
GLUCOSE BLD-MCNC: 129 MG/DL (ref 70–99)
GLUCOSE BLD-MCNC: 167 MG/DL (ref 70–99)
GLUCOSE BLD-MCNC: 178 MG/DL (ref 70–99)
GLUCOSE BLD-MCNC: 99 MG/DL (ref 70–99)
HBA1C MFR BLD: 9.5 %
HCT VFR BLD CALC: 29.1 % (ref 36–48)
HEMOGLOBIN: 9.6 G/DL (ref 12–16)
MCH RBC QN AUTO: 26 PG (ref 26–34)
MCHC RBC AUTO-ENTMCNC: 32.8 G/DL (ref 31–36)
MCV RBC AUTO: 79.3 FL (ref 80–100)
PDW BLD-RTO: 13.5 % (ref 12.4–15.4)
PERFORMED ON: ABNORMAL
PERFORMED ON: NORMAL
PLATELET # BLD: 329 K/UL (ref 135–450)
PMV BLD AUTO: 7.7 FL (ref 5–10.5)
POTASSIUM SERPL-SCNC: 3.6 MMOL/L (ref 3.5–5.1)
RBC # BLD: 3.67 M/UL (ref 4–5.2)
REPORT: NORMAL
SODIUM BLD-SCNC: 140 MMOL/L (ref 136–145)
WBC # BLD: 6.8 K/UL (ref 4–11)

## 2022-11-03 PROCEDURE — 97530 THERAPEUTIC ACTIVITIES: CPT

## 2022-11-03 PROCEDURE — 6360000002 HC RX W HCPCS: Performed by: HOSPITALIST

## 2022-11-03 PROCEDURE — 80048 BASIC METABOLIC PNL TOTAL CA: CPT

## 2022-11-03 PROCEDURE — 97162 PT EVAL MOD COMPLEX 30 MIN: CPT

## 2022-11-03 PROCEDURE — 97535 SELF CARE MNGMENT TRAINING: CPT

## 2022-11-03 PROCEDURE — 99223 1ST HOSP IP/OBS HIGH 75: CPT | Performed by: INTERNAL MEDICINE

## 2022-11-03 PROCEDURE — 1200000000 HC SEMI PRIVATE

## 2022-11-03 PROCEDURE — 6370000000 HC RX 637 (ALT 250 FOR IP): Performed by: HOSPITALIST

## 2022-11-03 PROCEDURE — 99233 SBSQ HOSP IP/OBS HIGH 50: CPT | Performed by: INTERNAL MEDICINE

## 2022-11-03 PROCEDURE — 2580000003 HC RX 258: Performed by: HOSPITALIST

## 2022-11-03 PROCEDURE — 85027 COMPLETE CBC AUTOMATED: CPT

## 2022-11-03 PROCEDURE — 70551 MRI BRAIN STEM W/O DYE: CPT

## 2022-11-03 PROCEDURE — 94760 N-INVAS EAR/PLS OXIMETRY 1: CPT

## 2022-11-03 PROCEDURE — 97166 OT EVAL MOD COMPLEX 45 MIN: CPT

## 2022-11-03 RX ORDER — ENOXAPARIN SODIUM 100 MG/ML
40 INJECTION SUBCUTANEOUS DAILY
Status: DISCONTINUED | OUTPATIENT
Start: 2022-11-04 | End: 2022-11-06 | Stop reason: HOSPADM

## 2022-11-03 RX ORDER — MECLIZINE HCL 12.5 MG/1
12.5 TABLET ORAL 3 TIMES DAILY PRN
Status: DISCONTINUED | OUTPATIENT
Start: 2022-11-03 | End: 2022-11-06 | Stop reason: HOSPADM

## 2022-11-03 RX ADMIN — GABAPENTIN 300 MG: 300 CAPSULE ORAL at 21:03

## 2022-11-03 RX ADMIN — ATORVASTATIN CALCIUM 40 MG: 40 TABLET, FILM COATED ORAL at 21:03

## 2022-11-03 RX ADMIN — INSULIN GLARGINE 20 UNITS: 100 INJECTION, SOLUTION SUBCUTANEOUS at 21:06

## 2022-11-03 RX ADMIN — CEFTRIAXONE SODIUM 1000 MG: 1 INJECTION, POWDER, FOR SOLUTION INTRAMUSCULAR; INTRAVENOUS at 16:44

## 2022-11-03 RX ADMIN — ENOXAPARIN SODIUM 30 MG: 100 INJECTION SUBCUTANEOUS at 08:32

## 2022-11-03 RX ADMIN — CLOPIDOGREL BISULFATE 75 MG: 75 TABLET ORAL at 08:32

## 2022-11-03 RX ADMIN — ACETAMINOPHEN 650 MG: 325 TABLET ORAL at 14:58

## 2022-11-03 RX ADMIN — AMLODIPINE BESYLATE 10 MG: 5 TABLET ORAL at 08:32

## 2022-11-03 RX ADMIN — MECLIZINE 12.5 MG: 12.5 TABLET ORAL at 09:50

## 2022-11-03 RX ADMIN — GABAPENTIN 300 MG: 300 CAPSULE ORAL at 08:31

## 2022-11-03 RX ADMIN — Medication 10 ML: at 21:05

## 2022-11-03 ASSESSMENT — ENCOUNTER SYMPTOMS
SINUS PRESSURE: 0
BACK PAIN: 0
CONSTIPATION: 0
DIARRHEA: 0
SINUS PAIN: 0
EYE REDNESS: 0
EYE DISCHARGE: 0
COUGH: 0
SHORTNESS OF BREATH: 0
ABDOMINAL PAIN: 0
WHEEZING: 0
SORE THROAT: 0
NAUSEA: 0
RHINORRHEA: 0

## 2022-11-03 ASSESSMENT — PAIN SCALES - GENERAL
PAINLEVEL_OUTOF10: 0
PAINLEVEL_OUTOF10: 6
PAINLEVEL_OUTOF10: 0

## 2022-11-03 ASSESSMENT — PAIN DESCRIPTION - LOCATION: LOCATION: BACK

## 2022-11-03 ASSESSMENT — PAIN DESCRIPTION - ORIENTATION: ORIENTATION: MID

## 2022-11-03 ASSESSMENT — PAIN DESCRIPTION - DESCRIPTORS: DESCRIPTORS: ACHING

## 2022-11-03 NOTE — PROGRESS NOTES
Pt awake in chair. VSS and pt set up to eat dinner. Denies any other needs. Call light in reach and chair alarm engaged.

## 2022-11-03 NOTE — CONSULTS
Infectious Diseases   Consult Note        Admission Date: 11/2/2022  Hospital Day: Hospital Day: 2   Attending: Minnette Sandifer, MD  Date of service: 11/3/22     Reason for admission: Dizziness [R42]  UTI (urinary tract infection) [N39.0]  ANABEL (acute kidney injury) ([facility] [N17.9]  Acute cystitis without hematuria [N30.00]  Hyperglycemia due to diabetes mellitus ([facility] [E11.65]    Chief complaint/ Reason for consult: Positive blood culture, complicated UTI    Microbiology:        I have reviewed allavailable micro lab data and cultures    Blood culture (2/2) - collected on 11/2/2022: Coagulase-negative Staphylococcus  Urine culture  - collected on 11/2/2022: Greater than 10,000 CFU per mL of E. coli      Antibiotics and immunizations:       Current antibiotics: All antibiotics and their doses were reviewed by me    Recent Abx Admin        No antibiotic orders with administrations found. Immunization History: All immunization history was reviewed by me today. There is no immunization history on file for this patient. Known drug allergies: All allergies were reviewed and updated    No Known Allergies    Social history:     Social History:  All social andepidemiologic history was reviewed and updated by me today as needed. Tobacco use:   reports that she has never smoked. She has never used smokeless tobacco.  Alcohol use:   reports no history of alcohol use. Currently lives in: [address] [place] [address]   reports no history of drug use. COVID VACCINATION AND LAB RESULT RECORDS:     Internal Administration   First Dose      Second Dose           Last COVID Lab No results found for: SARS-COV-2, SARS-COV-2 RNA, SARS-COV-2, SARS-COV-2, SARS-COV-2 BY PCR, SARS-COV-2, SARS-COV-2, SARS-COV-2         Assessment:     The patient is a 65 y.o. old female who  has a past medical history of Diabetes mellitus ([facility] [facility] Hypertension, Hypertension, and Stroke (cerebrum) ([facility] [facility] (2001).  with following problems:    Dizziness episodes  E. coli urinary tract infection  Positive blood cultures for coagulase-negative spinal coccus, likely contaminant from the skin  Chronic cerebrovascular ischemic disease with history of strokes  Type 2 diabetes mellitus  Essential hypertension  Hyperlipidemia  Overweight due to excess calorie intake : Body mass index is 29.24 kg/m². Discussion:      The patient was admitted with episodes of dizziness. Urinalysis on admission showed large leukocyte esterase and 162 white cells. Urine culture is growing greater than 100,000 CFU per mL of E. coli. Sensitivities are awaited. She had 2 sets of blood cultures done on admission. 2 sets of blood cultures seem to be drawn on the same date at the same time. When one of the blood culture was drawn from the right antecubital fossa, site of the blood draw is unfortunately not been mentioned on the second specimen. If it was drawn from the same site as a full specimen that it would technically be 1 set and not 2 different sets    I have a strong suspicion that positive blood cultures likely from contamination from the skin    I reviewed the MRI of her brain that was done earlier today. Did not show any acute abnormalities. Old strokes noted. Last 2D echo was in June 2018      Plan:     Diagnostic Workup:    Plan to repeat 2 sets of blood cultures tomorrow  Follow-up especially if E. coli isolated from the urine culture  Continue to follow fever curve, WBC count and blood cultures  Follow up on liverand renal functions closely  Will order 2D echo to assess her cardiac function    Antimicrobials: The patient is on IV ceftriaxone 1 g every 24 hour. I will leave her on IV ceftriaxone for now  Will hold off on initiating any new antibiotics  We will follow up on the culture results and clinical progress and will make further recommendations accordingly. Continue close vitals monitoring. Maintain good glycemic control.   Fall precautions. Aspiration precautions. Continue to watch for new fever or diarrhea. DVT prophylaxis. Discussed all above with patient and RN. Drug Monitoring:    Continue serial monitoring for antibiotic toxicity as follows: CBC, CMP  Continue to watch for following: new or worsening fever, hypotension, hives, lip swelling and redness or purulence at vascular access sites. I/v access Management:    Continue to monitor i.v access sites for erythema, induration, discharge or tenderness. As always, continue efforts to minimizetubes/lines/drains as clinically appropriate to reduce chances of line associated infections. Current isolation precautions: There are no current isolations documented for this patient. Level of complexity of visit and medical decision making: High     Risk of Complications/Morbidity: High     Illness(es)/ Infection present that pose threat to life/bodily function. There is potential for severe exacerbation of infection/side effects of treatment. Therapy requires intensive monitoring for antimicrobial agent toxicity. Thank you for involving me in the care of your patient. I will continue to follow. If you have any additional questions, please do not hesitate to contact me. Subjective:     Presenting complaint in ER:     Chief Complaint   Patient presents with    Hyperglycemia     Pt reports blood sugar in 300. Pt does not take insulin but does take metformin. Pt is from home. Pt denies n/v. Pt reports neuropathy. HPI: Alejandra Mireles Saturday is a 65 y.o. female patient, who was seen at the request of Dr. Gabbi Valdovinos MD.    History was obtained from chart review and the patient. The patient was admitted on 11/2/2022. I have been consulted to see the patient for above mentioned reason(s). The patient has multiple medical comorbidities, and presented to the ER for high blood glucose.     The patient has longstanding type 2 diabetes mellitus and peripheral neuropathy. She takes metformin. The patient checked her blood sugar at home and it was around 200 and then few minutes later is just found that it had read up to 300. She was having dry mouth and was feeling more thirsty. The patient had recently gone to [facility] where she had a CT scan of her head neck and thorax which was unremarkable. Her dizziness spells have continued. The patient got concerned and came to the ER with worsening dizziness symptoms and was hospitalized. Blood and urine cultures were sent on admission. Her white cell count was 6800. He was afebrile. She was started on empiric IV ceftriaxone    I have been asked for my opinion for management for this patient. Past Medical History: All past medical history reviewed today. Past Medical History:   Diagnosis Date    Diabetes mellitus ([facility]     Hypertension     Hypertension     Stroke (cerebrum) ([facility] 2001         Past Surgical History: All pastsurgical history was reviewed today. Past Surgical History:   Procedure Laterality Date    CHOLECYSTECTOMY           Family History: All family history was reviewed today. History reviewed. No pertinent family history. Medications: All current and past medications were reviewed. Medications Prior to Admission: DULoxetine (CYMBALTA) 20 MG extended release capsule, Take 20 mg by mouth daily  empagliflozin (JARDIANCE) 10 MG tablet, Take 10 mg by mouth daily Order sent to [facility] in May, but not picked up by patient.  (Patient not taking: Reported on 11/2/2022)  insulin glargine (LANTUS;BASAGLAR) 100 UNIT/ML injection pen, Inject 20 Units into the skin nightly (Patient not taking: Reported on 11/2/2022)  furosemide (LASIX) 20 MG tablet, Take 1 tablet by mouth daily as needed (For shortness of breath or leg swelling) (Patient not taking: Reported on 11/2/2022)  lisinopril (PRINIVIL;ZESTRIL) 10 MG tablet, Take 1 tablet by mouth daily  potassium chloride (KLOR-CON M) 10 MEQ extended release tablet, Take 1 tablet by mouth 2 times daily (Patient not taking: Reported on 11/2/2022)  ondansetron (ZOFRAN ODT) 4 MG disintegrating tablet, Take 1 tablet by mouth every 8 hours as needed for Nausea (Patient not taking: Reported on 11/2/2022)  atorvastatin (LIPITOR) 40 MG tablet, Take 1 tablet by mouth nightly  clopidogrel (PLAVIX) 75 MG tablet, Take 1 tablet by mouth daily (Patient not taking: Reported on 11/2/2022)  busPIRone (BUSPAR) 7.5 MG tablet, TAKE 1 TABLET BY MOUTH TWICE A DAY (Patient not taking: Reported on 11/2/2022)  metFORMIN (GLUCOPHAGE) 500 MG tablet, Take 500 mg by mouth 2 times daily (with meals)   gabapentin (NEURONTIN) 300 MG capsule, Take 300 mg by mouth 2 times daily. amLODIPine (NORVASC) 10 MG tablet, Take 10 mg by mouth daily      [START ON 11/4/2022] enoxaparin  40 mg SubCUTAneous Daily    amLODIPine  10 mg Oral Daily    atorvastatin  40 mg Oral Nightly    clopidogrel  75 mg Oral Daily    gabapentin  300 mg Oral BID    insulin glargine  20 Units SubCUTAneous Nightly    cefTRIAXone (ROCEPHIN) IV  1,000 mg IntraVENous Q24H    sodium chloride flush  5-40 mL IntraVENous 2 times per day    insulin lispro  0-8 Units SubCUTAneous TID WC    insulin lispro  0-4 Units SubCUTAneous Nightly          REVIEW OF SYSTEMS:       Review of Systems   Constitutional:  Positive for fatigue. Negative for chills, diaphoresis and fever. HENT:  Negative for ear discharge, ear pain, postnasal drip, rhinorrhea, sinus pressure, sinus pain and sore throat. Eyes:  Negative for discharge and redness. Respiratory:  Negative for cough, shortness of breath and wheezing. Cardiovascular:  Negative for chest pain and leg swelling. Gastrointestinal:  Negative for abdominal pain, constipation, diarrhea and nausea. Endocrine: Negative for cold intolerance, heat intolerance and polydipsia. Genitourinary:  Positive for dysuria. Negative for flank pain, frequency, hematuria and urgency. Musculoskeletal:  Negative for back pain and myalgias. Skin:  Negative for rash. Allergic/Immunologic: Negative for immunocompromised state. Neurological:  Positive for dizziness. Negative for seizures and headaches. Hematological:  Does not bruise/bleed easily. Psychiatric/Behavioral:  Negative for agitation, hallucinations and suicidal ideas. The patient is not nervous/anxious. All other systems reviewed and are negative. Objective:       PHYSICAL EXAM:      Vitals:   Vitals:    11/03/22 0048 11/03/22 0443 11/03/22 0820 11/03/22 0849   BP: (!) 162/85 (!) 149/79 (!) 161/79    Pulse: 82 82 80    Resp: 16 16 16 16   Temp: 97.3 °F (36.3 °C) 97.8 °F (36.6 °C) 97.6 °F (36.4 °C)    TempSrc: Oral Oral Axillary    SpO2: 100% 100% 97% 97%   Weight:           Physical Exam  Vitals and nursing note reviewed. Constitutional:       Appearance: She is well-developed. She is not diaphoretic. Comments: The patient was seen earlier today. HENT:      Head: Normocephalic and atraumatic. Right Ear: External ear normal. There is no impacted cerumen. Left Ear: External ear normal. There is no impacted cerumen. Nose: Nose normal.      Mouth/Throat:      Mouth: Mucous membranes are moist.      Pharynx: Oropharynx is clear. No oropharyngeal exudate. Eyes:      General: No scleral icterus. Right eye: No discharge. Left eye: No discharge. Conjunctiva/sclera: Conjunctivae normal.      Pupils: Pupils are equal, round, and reactive to light. Neck:      Thyroid: No thyromegaly. Cardiovascular:      Rate and Rhythm: Normal rate and regular rhythm. Heart sounds: Normal heart sounds. No murmur heard. No friction rub. Pulmonary:      Effort: No respiratory distress. Breath sounds: No stridor. No wheezing or rales. Abdominal:      General: Bowel sounds are normal.      Palpations: Abdomen is soft. Tenderness: There is no abdominal tenderness.  There is no guarding or rebound. Musculoskeletal:         General: No swelling, tenderness or deformity. Normal range of motion. Cervical back: Normal range of motion and neck supple. Right lower leg: No edema. Left lower leg: No edema. Lymphadenopathy:      Cervical: No cervical adenopathy. Skin:     General: Skin is warm and dry. Coloration: Skin is not jaundiced. Findings: No bruising, erythema or rash. Neurological:      General: No focal deficit present. Mental Status: She is alert and oriented to person, place, and time. Mental status is at baseline. Motor: No abnormal muscle tone. Psychiatric:         Mood and Affect: Mood normal.         Behavior: Behavior normal.         Lines and drains: All vascular access sites are healthy with no local erythema, discharge or tenderness. Intake and output:     I/O last 3 completed shifts: In: 120 [P.O.:120]  Out: 600 [Urine:600]    Lab Data:   All available labs were reviewed by me today. CBC:   Recent Labs     11/02/22  1300 11/03/22  0423   WBC 9.0 6.8   RBC 4.08 3.67*   HGB 10.5* 9.6*   HCT 32.2* 29.1*    329   MCV 79.0* 79.3*   MCH 25.8* 26.0   MCHC 32.7 32.8   RDW 13.8 13.5        BMP:  Recent Labs     11/02/22  1259 11/02/22  1300 11/03/22  0423   NA  --  136 140   K  --  3.8 3.6   CL  --  97* 105   CO2  --  27 26   BUN  --  47* 30*   CREATININE  --  1.6* 0.8   CALCIUM  --  9.7 9.1   GLUCOSE 231 247* 121*        Hepatic FunctionPanel:   Lab Results   Component Value Date/Time    ALKPHOS 85 11/02/2022 01:00 PM    ALT 10 11/02/2022 01:00 PM    AST 10 11/02/2022 01:00 PM    PROT 8.2 11/02/2022 01:00 PM    BILITOT 0.3 11/02/2022 01:00 PM    LABALBU 3.3 11/02/2022 01:00 PM       CPK:   Lab Results   Component Value Date    CKTOTAL 86 09/16/2021     ESR: No results found for: SEDRATE  CRP: No results found for: CRP      Imaging:     All pertinent images and reports for the current visit were reviewed by me during this visit.  I reviewed the chest x-ray/CT scan/MRI images and independently interpreted the findings and results today. MRI BRAIN WO CONTRAST   Final Result   1. No acute intracranial abnormality. No acute infarct. 2. Mild global parenchymal volume loss with moderate chronic microvascular   ischemic changes. 3. Small chronic infarct involving the left posterior frontal/parietal lobe. 4. Chronic lacunar infarcts involving the right thalamus and right lexi. Outside records:    Labs, Microbiology, Radiology and pertinent results from Care everywhere, if available, were reviewed as a part ofthe consultation. Problem list:       Patient Active Problem List   Diagnosis Code    Neck mass R22.1    TIA (transient ischemic attack) G45.9    Type 2 diabetes mellitus with neurologic complication, with long-term current use of insulin (HCC) E11.49, Z79.4    HTN (hypertension) I10    Dyslipidemia E78.5    Weakness R53.1    Dizziness R42    Hypokalemia E87.6    Hypomagnesemia E83.42    UTI (urinary tract infection) N39.0         Please note that this chart was generated using Dragon dictation software. Although every effort was made to ensure the accuracy of this automated transcription, some errors in transcription may have occurred inadvertently. If you may need any clarification, please do not hesitate to contact me through EPIC or at the phone number provided below with my electronic signature. Any pictures or media included in this note were obtained after taking informed verbal consent from the patient and with their approval to include those in the patient's medical record. Theresa Lizama MD, MPH, [address]  11/3/2022, 4:30 PM  [facility] Infectious Disease   [address] [place] [address]  Office: [phone]  Fax: [phone]  In-person Clinic days:  Tuesday & Thursday a.m. Virtual clinic days: Monday, Wednesday & Friday a.m.

## 2022-11-03 NOTE — ED PROVIDER NOTES
In addition to the advanced practice provider, I personally saw [facility] Saturday and performed a substantive portion of the visit including all aspects of the medical decision making. Briefly, this is a 65 y.o. female here for high blood glucose. Patient reports blood glucose has been in the 200's at home despite taking her medications as prescribed. Associated with mild dizziness, worsened with turning her head. On exam, patient afebrile and nontoxic. No distress. Oral mucosa tacky. Heart RRR. Lungs CTAB. Abdomen soft, nondistended, nontender to palpation in all quadrants. A&Ox4. Speech clear, face symmetric. PERRL, no nystagmus, normal test of skew. CN 2-12 intact. 5/5 motor and sensation grossly intact all extremities. No pronator drift. Normal finger to nose. Gait not tested. Screenings   [place] Coma Scale  Eye Opening: Spontaneous  Best Verbal Response: Oriented  Best Motor Response: Obeys commands  [place] Coma Scale Score: 15      Is this patient to be included in the SEP-1 Core Measure due to severe sepsis or septic shock? No   Exclusion criteria - the patient is NOT to be included for SEP-1 Core Measure due to:  2+ SIRS criteria are not met      MDM    Patient afebrile and nontoxic. No distress. Blood glucose was modestly elevated, however no criteria for DKA/HHS. No critical electrolyte derangement. Neuro exam nonfocal, nothing to suggest CVA/TIA as cause of dizziness. Laboratory work-up does show acute kidney injury, urinalysis also with UTI. Nothing clinically to suggest pyelonephritis. Clinically patient appears volume depleted and I suspect ANABEL is prerenal.  Patient received IV fluids, will also give ceftriaxone for UTI. She is not septic. Given ANABEL in setting of infection, will plan for admission. Patient is agreeable with plan. Case discussed with internal medicine team who will admit. I Dr. Laura Kline am the primary clinician of record.         Patient Referrals:  No follow-up provider specified. Discharge Medications:  Current Discharge Medication List          FINAL IMPRESSION  1. Hyperglycemia due to diabetes mellitus ([facility]    2. ANABEL (acute kidney injury) ([facility] [facility]    3. Acute cystitis without hematuria    4. Dizziness        Blood pressure (!) 152/80, pulse 87, temperature 97.6 °F (36.4 °C), temperature source Oral, resp. rate 18, weight 175 lb 11.2 oz (79.7 kg), SpO2 99 %. For further details of [facility] emergency department encounter, please see documentation by advanced practice provider, WILLIAM Schmitt.     Andrew Hoover DO (electronically signed)  Attending Emergency Physician       Andrew Hoover DO  11/02/22 5224

## 2022-11-03 NOTE — CARE COORDINATION
Discharge Planning Assessment  Readmission Score of 20%    RN/SW discharge planner met with patient/ (and family member) to discuss reason for admission, current living situation, and potential needs at the time of discharge    Demographics/Insurance verified Yes    Current type of dwelling: [facility]. [facility] from [facility]/ confirmed with: n/a    Living arrangements: The patient states her son and grandson lives with her. Level of function/Support: The patient reports she is unable to walk with out support. The patient states that her niece comes to help with cooking and cleaning everyday. PCP: STACEY Su CNP    Last Visit to PCP: The patient states her PCP comes to her home for visits. DME: Diane Sal, Wheelchair, Shower chair, Ramp. Active with any community resources/agencies/skilled home care: The patient states she is active with [facility] and is receiving meals on wheels      Medication compliance issues: none reported. Financial issues that could impact healthcare: none reported. Tentative discharge plan: PT/OT pending. The patient states she would like to return home at discharge and has the support from her family. Discussed and provided facilities of choice if transition to a skilled nursing facility is required at the time of discharge      Discussed with patient and/or family that on the day of discharge home tentative time of discharge will be between 10 AM and noon. Transportation at the time of discharge:  TBD.      Electronically signed by Laurelyn Oppenheim, MSW on 11/3/2022 at 9:19 AM

## 2022-11-03 NOTE — PROGRESS NOTES
[facility] Department   Phone: [phone]    Physical Therapy    [x] Initial Evaluation            [] Daily Treatment Note         [] Discharge Summary      Patient: Jesse Hall Saturday   : 1945   MRN: [id number]   Date of Service:  11/3/2022  Admitting Diagnosis: UTI (urinary tract infection)  Current Admission Summary: Jesse Hall Saturday is a 65 y.o. female who presents to the emergency department due to elevated blood sugar noted this morning. Patient states that she does not take insulin but does take metformin and states that she supposed take it twice and wants to know why. And states that she took her first dose this morning but has not taken her second dose yet. Patient states that this morning she checked her blood sugar and it was around 200. She took it a few moments later and it was elevated at 300. Patient states that she has noticed more dry mouth and thirst recently. Patient states that she was recently seen in the hospital at [facility] for dizziness where she had a CT scan of the head neck and thorax which did not show any acute abnormalities. Patient states that she still has dizziness at times and states that it is related to movement of her head. Patient denies any fevers, chills, nausea, vomiting, abdominal pain, dysuria or hematuria. She does state that about a month ago she did have a urinary tract infection that was treated with antibiotics. Patient states that she is concerned that her blood sugar keeps going up and down and wants to know why that is. Past Medical History:  has a past medical history of Diabetes mellitus ([address] Hypertension, Hypertension, and Stroke (cerebrum) ([address] Past Surgical History:  has a past surgical history that includes Cholecystectomy. Discharge Recommendations: Jesse Hall Saturday scored a 9/24 on the AM-PAC short mobility form.  Current research shows that an AM-PAC score of 17 or less is typically not associated with a discharge to the patient's home setting. Based on the patient's AM-PAC score and their current functional mobility deficits, it is recommended that the patient have 3-5 sessions per week of Physical Therapy at d/c to increase the patient's independence. Please see assessment section for further patient specific details. If patient discharges prior to next session this note will serve as a discharge summary. Please see below for the latest assessment towards goals. DME Required For Discharge: DME to be determined at next level of care  Precautions/Restrictions: high fall risk  Weight Bearing Restrictions: no restrictions  [] Right Upper Extremity  [] Left Upper Extremity [] Right Lower Extremity  [] Left Lower Extremity     Required Braces/Orthotics: no braces required   [] Right  [] Left  Positional Restrictions:no positional restrictions    Pre-Admission Information   Lives With: son, grandchildren-grandson                   Type of Home: house  Home Layout: one level  Home Access: ramped entry  Bathroom Layout: walk in shower  Bathroom Equipment: grab bars in shower, shower chair  Toilet Height: standard height  Home Equipment: rollator - 4 wheeled walker, single point cane  Transfer Assistance: modified independent with use of 4WW  Ambulation Assistance:modified independent with use of 4WW  ADL Assistance: independent with all ADL's  IADL Assistance: independent with homemaking tasks  Active :        [] Yes                 [x] No-- Son drives  Hand Dominance: [] Left                 [] Right  Current Employment: retired. Occupation: [employer]  Hobbies:   Recent Falls: None reported    Examination   Vision:   Vision Gross Assessment: Impaired and Vision Corrective Device: wears glasses for reading  Hearing:   WFL  Posture:    Forward head, rounded shoulders   Sensation:   Reports neuropathy in B UE and B LE   ROM:   (B) LE AROM WFL  Strength:   Functional strength assessed via completion of STS with min A   Decision Making: medium complexity  Clinical Presentation: evolving      Subjective  General: Pt supine in bed upon arrival. Pt agreeable to PT/OT eval. Pt reporting feeling dizzy when she turns her head side to side   Pain: 8/10. Location: back   Pain Interventions: RN notified of patient request for pain medication and biofreeze applied       Functional Mobility  Bed Mobility  Supine to Sit: minimal assistance  Scooting: minimal assistance  Comments: HOB elevated, pulls up on therapist   Transfers  Sit to stand transfer: minimal assistance  Stand to sit transfer: minimal assistance  Comments: cues for safe hand placement   Ambulation  Surface:level surface  Assistive Device: rolling walker  Assistance: minimal assistance  Distance: 5ft   Gait Mechanics: forward flexed posture, decreased step length, decreased step height, decreased jessie, fatigues quickly, cues to keep hands on RW   Comments:  Pt ambulates 5ft from EOB>sink>chair with RW min A. Assist with RW navigation   Stair Mobility  Stair mobility not completed on this date. Comments:  Wheelchair Mobility:  No w/c mobility completed on this date. Comments:  Balance  Static Sitting Balance: fair (+): maintains balance at SBA/supervision without use of UE support  Dynamic Sitting Balance: fair (+): maintains balance at SBA/supervision without use of UE support  Static Standing Balance: poor (+): requires min (A) to maintain balance  Dynamic Standing Balance: poor (+): requires min (A) to maintain balance  Comments: Pt sits EOB ~10 mins with SBA while completing ADLs with OT (see OT notes).  Pt stands at [employer] with min A~4 mins while completing pericare bathing and attempting to don brief     Other Therapeutic Interventions    Functional Outcomes  AM-PAC Inpatient Mobility Raw Score : 9              Cognition  Overall Cognitive Status: Impaired  Safety Judgement: decreased awareness of need for assistance  Problem Solving: assistance required to generate solutions, assistance required to identify errors made  Initiation: requires cues for some  Sequencing: requires cues for some  Orientation:    alert and oriented x 4  Command Following:   [facility]    Education  Barriers To Learning: cognition  Patient Education: patient educated on goals, PT role and benefits, plan of care, discharge recommendations  Learning Assessment:  patient verbalizes and demonstrates understanding    Assessment  Activity Tolerance: Pt fatigues quickly with functional mobility. Pt reports back pain and dizziness throughout session. Impairments Requiring Therapeutic Intervention: decreased functional mobility, decreased strength, decreased safety awareness, decreased cognition, decreased endurance, decreased sensation, decreased balance, vestibular impairment, increased pain  Prognosis: good  Clinical Assessment: Pt presents to hospital with dizziness and UTI. At this time, pt requires min A to complete bed mobility and functional transfers with RW and can only tolerate ambulating 5ft with min A RW. Pt would continue to benefit from skilled PT to address above deficits and safely return to PLOF.    Safety Interventions: patient left in chair, chair alarm in place, call light within reach, gait belt, patient at risk for falls, nurse notified, and family/caregiver present    Plan  Frequency: 3-5 x/per week  Current Treatment Recommendations: strengthening, balance training, functional mobility training, transfer training, gait training, endurance training, and pain management    Goals  Patient Goals: none stated    Short Term Goals:  Time Frame: upon discharge   Patient will complete bed mobility at [facility]   Patient will complete transfers at [facility]   Patient will ambulate 50 ft with use of LRAD at [facility]    Therapy Session Time      Individual Group Co-treatment   Time In     1421   Time Out     6021 Minutes     42     Timed Code Treatment Minutes:   27  Total Treatment Minutes:  42       Electronically Signed By: Ilya Sheridan Do [address], DPT [id number]

## 2022-11-03 NOTE — PROGRESS NOTES
With permission from patient, I updated beau Perezda via phone. Questions asked and answered. Verbalized understanding. Will continue to monitor.

## 2022-11-03 NOTE — PROGRESS NOTES
[facility] PROGRESS NOTE    11/3/2022 10:43 AM        Name: Harini Engel Saturday . Admitted: 2022  Primary Care Provider: STACEY Luong CNP (Tel: [phone])                        Subjective:  . No acute events overnight. Resting well. Pain control. Diet ok. Labs reviewed  Denies any chest pain sob.      Reviewed interval ancillary notes    Current Medications  meclizine (ANTIVERT) tablet 12.5 mg, TID PRN  amLODIPine (NORVASC) tablet 10 mg, Daily  atorvastatin (LIPITOR) tablet 40 mg, Nightly  clopidogrel (PLAVIX) tablet 75 mg, Daily  gabapentin (NEURONTIN) capsule 300 mg, BID  insulin glargine (LANTUS) injection vial 20 Units, Nightly  cefTRIAXone (ROCEPHIN) 1,000 mg in dextrose 5 % 50 mL IVPB mini-bag, Q24H  dextrose bolus 10% 125 mL, PRN   Or  dextrose bolus 10% 250 mL, PRN  glucagon (rDNA) injection 1 mg, PRN  dextrose 10 % infusion, Continuous PRN  sodium chloride flush 0.9 % injection 5-40 mL, 2 times per day  sodium chloride flush 0.9 % injection 5-40 mL, PRN  0.9 % sodium chloride infusion, PRN  enoxaparin Sodium (LOVENOX) injection 30 mg, Daily  ondansetron (ZOFRAN-ODT) disintegrating tablet 4 mg, Q8H PRN   Or  ondansetron (ZOFRAN) injection 4 mg, Q6H PRN  polyethylene glycol (GLYCOLAX) packet 17 g, Daily PRN  acetaminophen (TYLENOL) tablet 650 mg, Q6H PRN   Or  acetaminophen (TYLENOL) suppository 650 mg, Q6H PRN  insulin lispro (HUMALOG) injection vial 0-8 Units, TID WC  insulin lispro (HUMALOG) injection vial 0-4 Units, Nightly        Objective:  BP (!) 161/79   Pulse 80   Temp 97.6 °F (36.4 °C) (Axillary)   Resp 16   Wt 175 lb 11.2 oz (79.7 kg)   SpO2 97%   BMI 29.24 kg/m²     Intake/Output Summary (Last 24 hours) at 11/3/2022 1043  Last data filed at 11/3/2022 0830  Gross per 24 hour   Intake 300 ml   Output 600 ml   Net -300 ml      Wt Readings from Last 3 Encounters:   11/02/22 175 lb 11.2 oz (79.7 kg)   08/20/22 173 lb 8 oz (78.7 kg)   04/05/22 156 lb (70.8 kg)       General appearance:  Appears comfortable  Eyes: Sclera clear. Pupils equal.  ENT: Moist oral mucosa. Trachea midline, no adenopathy. Cardiovascular: Regular rhythm, normal S1, S2. No murmur. No edema in lower extremities  Respiratory: Not using accessory muscles. Good inspiratory effort. Clear to auscultation bilaterally, no wheeze or crackles. GI: Abdomen soft, no tenderness, not distended, normal bowel sounds  Musculoskeletal: No cyanosis in digits, neck supple  Neurology: CN 2-12 grossly intact. No speech or motor deficits  Psych: Normal affect. Alert and oriented in time, place and person  Skin: Warm, dry, normal turgor    Labs and Tests:  CBC:   Recent Labs     11/02/22  1300 11/03/22  0423   WBC 9.0 6.8   HGB 10.5* 9.6*    329     BMP:    Recent Labs     11/02/22  1259 11/02/22  1300 11/03/22  0423   NA  --  136 140   K  --  3.8 3.6   CL  --  97* 105   CO2  --  27 26   BUN  --  47* 30*   CREATININE  --  1.6* 0.8   GLUCOSE 231 247* 121*     Hepatic:   Recent Labs     11/02/22  1300   AST 10*   ALT 10   BILITOT 0.3   ALKPHOS 85       Discussed care with patient             Problem List  Principal Problem:    UTI (urinary tract infection)  Resolved Problems:    * No resolved hospital problems. *       Assessment & Plan:   Dizziness  -Patient still with persistent dizziness  -MRI to rule out stroke meclizine as needed doses previous history of CVA and also dizziness    UTI  -Continue IV antibiotic    Acute kidney injury improving fluid hold      Diet: ADULT DIET;  Regular; 5 carb choices (75 gm/meal)  Code:Full Code  DVT PPX lovenox       Germaine Go MD   11/3/2022 10:43 AM

## 2022-11-03 NOTE — PROGRESS NOTES
Office : [phone]     Fax :[phone]       Nephrology progress  Note      Patient's Name: Marylouise Soulier Saturday  8:33 AM  11/3/2022    Reason for Consult: ANABEL      Requesting Physician:  STACEY Fry - RADHA      Chief Complaint:    Chief Complaint   Patient presents with    Hyperglycemia     Pt reports blood sugar in 300. Pt does not take insulin but does take metformin. Pt is from home. Pt denies n/v. Pt reports neuropathy. History of Present Ilness:    Marylouise Soulier Saturday is a 65 y.o. female with prior history of HTN, DM 2, CVA who was admitted for hyperglycemia and ANABEL. She presented with complaints of elevated blood sugar. Patient said she did not take any insulin and metformin due to not feeling well. Patient said this morning she checked her glucose it was around 200 so this afternoon rechecked again it was up to 300. Patient also complaining of dry mouth and feeling excessive thirst.  Patient was recently seen mental health for dizziness which had negative CT scan. With no acute finding. Patient says still having intermittent dizziness related to head movement. Patient denies any fevers chills no nausea vomiting does have some dysuria. Nothing that makes it better or worse. Initial blood work shows elevated creat of 1.6 . Baseline is 0.8       Interval hx :    C/o headache   No SOB             Prior to Admission medications    Medication Sig Start Date End Date Taking? Authorizing Provider   DULoxetine (CYMBALTA) 20 MG extended release capsule Take 20 mg by mouth daily   Yes Historical Provider, MD   empagliflozin (JARDIANCE) 10 MG tablet Take 10 mg by mouth daily Order sent to [facility] in May, but not picked up by patient.   Patient not taking: Reported on 2022   Yes Historical Provider, MD   insulin glargine (LANTUS;BASAGLAR) 100 UNIT/ML injection pen Inject 20 Units into the skin nightly  Patient not taking: Reported on 2022   Sb Hallman MD   furosemide (LASIX) 20 MG tablet Take 1 tablet by mouth daily as needed (For shortness of breath or leg swelling)  Patient not taking: Reported on 2022   Yamilex Reyes MD   lisinopril (PRINIVIL;ZESTRIL) 10 MG tablet Take 1 tablet by mouth daily 22   Yamilex Reyes MD   potassium chloride (KLOR-CON M) 10 MEQ extended release tablet Take 1 tablet by mouth 2 times daily  Patient not taking: Reported on 2022   Jason De Oliveira MD   ondansetron (ZOFRAN ODT) 4 MG disintegrating tablet Take 1 tablet by mouth every 8 hours as needed for Nausea  Patient not taking: Reported on 2022   David Zuñiga PA-C   atorvastatin (LIPITOR) 40 MG tablet Take 1 tablet by mouth nightly 18   Eliu Wadsworth MD   clopidogrel (PLAVIX) 75 MG tablet Take 1 tablet by mouth daily  Patient not taking: Reported on 2022 6/10/18   Eliu Wadsworth MD   busPIRone (BUSPAR) 7.5 MG tablet TAKE 1 TABLET BY MOUTH TWICE A DAY  Patient not taking: Reported on 2022 10/20/17   Historical Provider, MD   metFORMIN (GLUCOPHAGE) 500 MG tablet Take 500 mg by mouth 2 times daily (with meals)     Historical Provider, MD   gabapentin (NEURONTIN) 300 MG capsule Take 300 mg by mouth 2 times daily. Historical Provider, MD   amLODIPine (NORVASC) 10 MG tablet Take 10 mg by mouth daily  4/10/14   Historical Provider, MD             I/O last 3 completed shifts: In: 80 [P.O.:120]  Out: 0 [Urine:600]    Past Medical History:   Diagnosis Date    Diabetes mellitus ([facility]     Hypertension     Hypertension     Stroke (cerebrum) ([facility]        Past Surgical History:   Procedure Laterality Date    CHOLECYSTECTOMY         History reviewed. No pertinent family history.      reports that she has never smoked. She has never used smokeless tobacco. She reports that she does not drink alcohol and does not use drugs. Allergies:  Patient has no known allergies.     Current Medications:    amLODIPine (NORVASC) tablet 10 mg, Daily  atorvastatin (LIPITOR) tablet 40 mg, Nightly  clopidogrel (PLAVIX) tablet 75 mg, Daily  gabapentin (NEURONTIN) capsule 300 mg, BID  insulin glargine (LANTUS) injection vial 20 Units, Nightly  cefTRIAXone (ROCEPHIN) 1,000 mg in dextrose 5 % 50 mL IVPB mini-bag, Q24H  0.9 % sodium chloride infusion, Continuous  dextrose bolus 10% 125 mL, PRN   Or  dextrose bolus 10% 250 mL, PRN  glucagon (rDNA) injection 1 mg, PRN  dextrose 10 % infusion, Continuous PRN  sodium chloride flush 0.9 % injection 5-40 mL, 2 times per day  sodium chloride flush 0.9 % injection 5-40 mL, PRN  0.9 % sodium chloride infusion, PRN  enoxaparin Sodium (LOVENOX) injection 30 mg, Daily  ondansetron (ZOFRAN-ODT) disintegrating tablet 4 mg, Q8H PRN   Or  ondansetron (ZOFRAN) injection 4 mg, Q6H PRN  polyethylene glycol (GLYCOLAX) packet 17 g, Daily PRN  acetaminophen (TYLENOL) tablet 650 mg, Q6H PRN   Or  acetaminophen (TYLENOL) suppository 650 mg, Q6H PRN  insulin lispro (HUMALOG) injection vial 0-8 Units, TID WC  insulin lispro (HUMALOG) injection vial 0-4 Units, Nightly        Physical exam:     Vitals:  BP (!) 161/79   Pulse 80   Temp 97.6 °F (36.4 °C) (Axillary)   Resp 16   Wt 175 lb 11.2 oz (79.7 kg)   SpO2 97%   BMI 29.24 kg/m²   Constitutional:  OAA X3 NAD  Skin: no rash, turgor wnl  Heent:  eomi, mmm  Neck: no bruits or jvd noted  Cardiovascular:  S1, S2 without m/r/g  Respiratory: CTA B without w/r/r  Abdomen:  +bs, soft, nt, nd  Ext: no  lower extremity edema  Psychiatric: mood and affect appropriate  Musculoskeletal:  Rom, muscular strength intact    Labs:  CBC:   Recent Labs     11/02/22  1300 11/03/22  0423   WBC 9.0 6.8   HGB 10.5* 9.6*    329     BMP:    Recent Labs 11/02/22  1259 11/02/22  1300 11/03/22  0423   NA  --  136 140   K  --  3.8 3.6   CL  --  97* 105   CO2  --  27 26   BUN  --  47* 30*   CREATININE  --  1.6* 0.8   GLUCOSE 231 247* 121*     Ca/Mg/Phos:   Recent Labs     11/02/22  1300 11/03/22  0423   CALCIUM 9.7 9.1     Hepatic:   Recent Labs     11/02/22  1300   AST 10*   ALT 10   BILITOT 0.3   ALKPHOS 85     Troponin: No results for input(s): TROPONINI in the last 72 hours. BNP: No results for input(s): BNP in the last 72 hours. Lipids: No results for input(s): CHOL, TRIG, HDL, LDLCALC, LABVLDL in the last 72 hours. ABGs: No results for input(s): PHART, PO2ART, XBX0XUR in the last 72 hours. INR: No results for input(s): INR in the last 72 hours. UA:  Recent Labs     11/02/22  1418   COLORU DARK YELLOW*   CLARITYU CLOUDY*   GLUCOSEU Negative   BILIRUBINUR SMALL*   KETUA TRACE*   SPECGRAV 1.025   BLOODU SMALL*   PHUR 5.0   PROTEINU 100*   UROBILINOGEN 1.0   NITRU Negative   LEUKOCYTESUR LARGE*   LABMICR YES   URINETYPE NotGiven      Urine Microscopic:   Recent Labs     11/02/22  1418   BACTERIA None Seen   HYALCAST 54*   WBCUA 162*   RBCUA 2   EPIU 9*     Urine Culture: No results for input(s): LABURIN in the last 72 hours. Urine Chemistry: No results for input(s): Bobbette Gaudier, PROTEINUR, NAUR in the last 72 hours. IMAGING:  No orders to display                       Assessment/Plan :      1. ANABEL   Likely pre renal   Hold lasix   Hold jardiance       DC iv fluids       Recommend to dose adjust all medications  based on renal functions  Maintain SBP> 90 mmHg   Daily weights   AVOID NSAIDs  Avoid Nephrotoxins  Monitor Intake/Output  Call if significant decrease in urine output      2. UTI. Abx    3. HTn. Resume lisinopril.        D/w primary team      Thank you for allowing us to participate in care of [facility] Saturday         Electronically signed by: Arsenio Rosales MD, 11/3/2022, 8:33 AM      [facility] [address]  Office : [phone]  Fax :[phone]

## 2022-11-03 NOTE — PROGRESS NOTES
Infectious disease consult received. The patient was seen and examined at bedside. Orders placed in the chart. Full ID consult note to follow. Thank you for involving me in the care of your patient. I will continue to follow. Gloria Chung MD, MPH, Harinder Samaniego, NOEL  11/3/2022, 4:46 PM  [facility] Infectious Disease   [address] [place] [address]  Office: [phone]  Fax: [phone]  In-person Clinic days:  Tuesday & Thursday a.m. Virtual clinic days: Monday, Wednesday & Friday a.m.

## 2022-11-03 NOTE — PROGRESS NOTES
Assessment completed and documented. Denies pain/SOB. Fine crackles bilat mid to base. Denies needs. Will continue to monitor.

## 2022-11-03 NOTE — PROGRESS NOTES
11/03/22 1655   Encounter Summary   Encounter Overview/Reason  Advance Care Planning   Service Provided For:   (Spoke w/pt's nurse.)   Referral/Consult From: Nurse   Last Encounter  11/03/22  (Per nurse clarification, pt does not appear to be appropriate for AD discussion at this time. GB 11/3)   Complexity of Encounter Low   Begin Time 1600   End Time  1605   Total Time Calculated 5 min   Advance Care Planning   Type   (Pt does not appear appropriate for AD discussion at this time, per nurse's clarification.  GB 11/3)     Electronically signed by Dylon Vinson on 11/3/2022 at 4:57 PM

## 2022-11-03 NOTE — PLAN OF CARE
Problem: Discharge Planning  Goal: Discharge to home or other facility with appropriate resources  Outcome: Progressing  Flowsheets (Taken 11/2/2022 2015)  Discharge to home or other facility with appropriate resources: Identify barriers to discharge with patient and caregiver     Problem: Safety - Adult  Goal: Free from fall injury  Outcome: Progressing

## 2022-11-03 NOTE — PROGRESS NOTES
[facility]   Phone: [phone]    Occupational Therapy    [x] Initial Evaluation            [] Daily Treatment Note         [] Discharge Summary      Patient: Rocio Collado Saturday   : 1945   MRN: [id number]   Date of Service:  11/3/2022    Admitting Diagnosis:  UTI (urinary tract infection)  Current Admission Summary: Rocio Collado Saturday is a 65 y.o. female who presents to the emergency department due to elevated blood sugar noted this morning. Patient states that she does not take insulin but does take metformin and states that she supposed take it twice and wants to know why. And states that she took her first dose this morning but has not taken her second dose yet. Patient states that this morning she checked her blood sugar and it was around 200. She took it a few moments later and it was elevated at 300. Patient states that she has noticed more dry mouth and thirst recently. Patient states that she was recently seen in the hospital at [facility] for dizziness where she had a CT scan of the head neck and thorax which did not show any acute abnormalities. Patient states that she still has dizziness at times and states that it is related to movement of her head. Patient denies any fevers, chills, nausea, vomiting, abdominal pain, dysuria or hematuria. She does state that about a month ago she did have a urinary tract infection that was treated with antibiotics. Patient states that she is concerned that her blood sugar keeps going up and down and wants to know why that is. Past Medical History:  has a past medical history of Diabetes mellitus ([address] Hypertension, Hypertension, and Stroke (cerebrum) ([address] Past Surgical History:  has a past surgical history that includes Cholecystectomy. Discharge Recommendations: Rocio Collado Saturday scored a 15/24 on the AM-PAC ADL Inpatient form.  Current research shows that an AM-PAC score of 17 or less is typically not associated with a discharge to the patient's home setting. Based on the patient's AM-PAC score and their current ADL deficits, it is recommended that the patient have 3-5 sessions per week of Occupational Therapy at d/c to increase the patient's independence. Please see assessment section for further patient specific details. If patient discharges prior to next session this note will serve as a discharge summary. Please see below for the latest assessment towards goals. DME Required For Discharge: DME to be determined at next level of care    Precautions/Restrictions: high fall risk  Weight Bearing Restrictions: no restrictions  [] Right Upper Extremity  [] Left Upper Extremity [] Right Lower Extremity  [] Left Lower Extremity     Required Braces/Orthotics: no braces required   [] Right  [] Left  Positional Restrictions:no positional restrictions    Pre-Admission Information   Lives With: son, grandchildren-grandson    Type of Home: house  Home Layout: one level  Home Access: ramped entry  Bathroom Layout: walk in shower  Bathroom Equipment: grab bars in shower, shower chair  Toilet Height: standard height  Home Equipment: rollator - 4 wheeled walker, single point cane  Transfer Assistance: modified independent with use of 4WW  Ambulation Assistance:modified independent with use of 4WW  ADL Assistance: independent with all ADL's  IADL Assistance: independent with homemaking tasks  Active :        [] Yes  [x] No-- Son drives  Hand Dominance: [] Left  [] Right  Current Employment: retired.   Occupation: [employer]  Hobbies:   Recent Falls: None reported    Examination   Vision:   Vision Gross Assessment: Impaired and Vision Corrective Device: wears glasses for reading  Hearing:   Appia Warwick  Sensation:   reports numbness and tingling in all extremities   ROM:   (B) UE AROM WFL  Strength:   (B) UE strength grossly WFL    Decision Making: medium complexity  Clinical Presentation: evolving      Subjective  General: Pt supine in bed upon arrival. Agreeable to therapy. Pain: 8/10. Location: back  Pain Interventions: RN notified, RN notified of patient request for pain medication, and biofreeze applied        Activities of Daily Living  Basic Activities of Daily Living  Upper Extremity Bathing: stand by assistance Comment: seated at EOB using soap water and cloth  Lower Extremity Bathing: contact guard assistance Comment: Pt walked 2 steps to sink to perform activity with UE support of sink   Upper Extremity Dressing: minimal assistance Comment: Braxton winstonwandrew  Lower Extremity Dressing: dependent Comment: Braxton beltrán  Instrumental Activities of Daily Living  No IADL completed on this date. Functional Mobility  Bed Mobility  Supine to Sit: minimal assistance  Scooting: minimal assistance  Comments: HOB elevated  Transfers  Sit to stand transfer:minimal assistance  Stand to sit transfer: minimal assistance  Bed / Chair transfer: minimal assistance. Comments: EOB >< EOB; EOB > recliner  Functional Mobility:  Sitting Balance: stand by assistance. Standing Balance: stand by assistance, w/ RW. Functional Mobility Device Use: rolling walker    Other Therapeutic Interventions    Functional Outcomes  AM-PAC Inpatient Daily Activity Raw Score: 15    Cognition  Safety Judgement: decreased awareness of need for assistance  Problem Solving: assistance required to generate solutions, assistance required to identify errors made  Initiation: requires cues for some  Sequencing: requires cues for some  Orientation:    alert and oriented x 4  Command Following:   [facility]     Education  Barriers To Learning: none  Patient Education: patient educated on goals, OT role and benefits, plan of care, ADL adaptive strategies, IADL safety, transfer training, discharge recommendations  Learning Assessment:  patient verbalizes and demonstrates understanding    Assessment  Activity Tolerance: limited d/t fatigue. Pt verbalized feeling nauseous when changing positions and dizzy when she turns her head. Sp02 >90% and BP WFL throughout entire session  Impairments Requiring Therapeutic Intervention: decreased functional mobility, decreased ADL status, decreased ROM, decreased strength, decreased safety awareness, decreased endurance, decreased IADL, increased pain  Prognosis: good  Clinical Assessment: Pt presents below baseline d/t deficits above. Pt is currently dependent for LB dressing and is min A for bed mobility and transfers. Prior to admission pt was IND in all ADLs and mod I for transfers and AMB. Pt would benefit from continued OT services to facilitate return to PLOF. Safety Interventions: patient left in chair, chair alarm in place, call light within reach, patient at risk for falls, and nurse notified    Plan  Frequency: 3-5 x/per week  Current Treatment Recommendations: strengthening, ROM, balance training, functional mobility training, transfer training, endurance training, patient/caregiver education, ADL/self-care training, IADL training, pain management, safety education, and equipment evaluation/education    Goals  Short Term Goals:  Time Frame: By discharge  Patient will complete lower body ADL at stand by assistance   Patient will complete toileting at stand by assistance   Patient will complete grooming at supervision   Patient will complete functional transfers at stand by assistance   Patient will increase [facility] ADL score = to or > than 17/24    Therapy Session Time     Individual Group Co-treatment   Time In    1421   Time Out    1503   Minutes    42        Timed Code Treatment Minutes:   27  Total Treatment Minutes:  Alfredo S/OT  Electronically Signed By: Tramaine Du OT    OT provided direct supervision to student, facilitated in making skilled judgements throughout duration of session.     ELIO Rodrigez OTR/L [id number]

## 2022-11-04 LAB
ANION GAP SERPL CALCULATED.3IONS-SCNC: 7 MMOL/L (ref 3–16)
BUN BLDV-MCNC: 17 MG/DL (ref 7–20)
CALCIUM SERPL-MCNC: 9.3 MG/DL (ref 8.3–10.6)
CHLORIDE BLD-SCNC: 106 MMOL/L (ref 99–110)
CO2: 27 MMOL/L (ref 21–32)
CREAT SERPL-MCNC: 0.6 MG/DL (ref 0.6–1.2)
GFR SERPL CREATININE-BSD FRML MDRD: >60 ML/MIN/{1.73_M2}
GLUCOSE BLD-MCNC: 102 MG/DL (ref 70–99)
GLUCOSE BLD-MCNC: 171 MG/DL (ref 70–99)
GLUCOSE BLD-MCNC: 176 MG/DL (ref 70–99)
GLUCOSE BLD-MCNC: 96 MG/DL (ref 70–99)
HCT VFR BLD CALC: 32.7 % (ref 36–48)
HEMOGLOBIN: 10.3 G/DL (ref 12–16)
LV EF: 55 %
LVEF MODALITY: NORMAL
MCH RBC QN AUTO: 25.5 PG (ref 26–34)
MCHC RBC AUTO-ENTMCNC: 31.6 G/DL (ref 31–36)
MCV RBC AUTO: 80.7 FL (ref 80–100)
ORGANISM: ABNORMAL
PDW BLD-RTO: 13.7 % (ref 12.4–15.4)
PERFORMED ON: ABNORMAL
PERFORMED ON: ABNORMAL
PERFORMED ON: NORMAL
PLATELET # BLD: 355 K/UL (ref 135–450)
PMV BLD AUTO: 7.3 FL (ref 5–10.5)
POTASSIUM SERPL-SCNC: 3.6 MMOL/L (ref 3.5–5.1)
RBC # BLD: 4.05 M/UL (ref 4–5.2)
SODIUM BLD-SCNC: 140 MMOL/L (ref 136–145)
URINE CULTURE, ROUTINE: ABNORMAL
WBC # BLD: 5.7 K/UL (ref 4–11)

## 2022-11-04 PROCEDURE — 93306 TTE W/DOPPLER COMPLETE: CPT

## 2022-11-04 PROCEDURE — 6370000000 HC RX 637 (ALT 250 FOR IP): Performed by: HOSPITALIST

## 2022-11-04 PROCEDURE — 6370000000 HC RX 637 (ALT 250 FOR IP): Performed by: INTERNAL MEDICINE

## 2022-11-04 PROCEDURE — 2580000003 HC RX 258: Performed by: HOSPITALIST

## 2022-11-04 PROCEDURE — 80048 BASIC METABOLIC PNL TOTAL CA: CPT

## 2022-11-04 PROCEDURE — 87040 BLOOD CULTURE FOR BACTERIA: CPT

## 2022-11-04 PROCEDURE — 6360000002 HC RX W HCPCS: Performed by: HOSPITALIST

## 2022-11-04 PROCEDURE — 99233 SBSQ HOSP IP/OBS HIGH 50: CPT | Performed by: INTERNAL MEDICINE

## 2022-11-04 PROCEDURE — 94760 N-INVAS EAR/PLS OXIMETRY 1: CPT

## 2022-11-04 PROCEDURE — 1200000000 HC SEMI PRIVATE

## 2022-11-04 PROCEDURE — 36415 COLL VENOUS BLD VENIPUNCTURE: CPT

## 2022-11-04 PROCEDURE — 85027 COMPLETE CBC AUTOMATED: CPT

## 2022-11-04 RX ORDER — LISINOPRIL 10 MG/1
10 TABLET ORAL DAILY
Status: DISCONTINUED | OUTPATIENT
Start: 2022-11-04 | End: 2022-11-06 | Stop reason: HOSPADM

## 2022-11-04 RX ORDER — SODIUM CHLORIDE 9 MG/ML
INJECTION, SOLUTION INTRAVENOUS
Status: DISPENSED
Start: 2022-11-04 | End: 2022-11-05

## 2022-11-04 RX ADMIN — MECLIZINE 12.5 MG: 12.5 TABLET ORAL at 00:17

## 2022-11-04 RX ADMIN — LISINOPRIL 10 MG: 10 TABLET ORAL at 10:32

## 2022-11-04 RX ADMIN — GABAPENTIN 300 MG: 300 CAPSULE ORAL at 10:32

## 2022-11-04 RX ADMIN — ATORVASTATIN CALCIUM 40 MG: 40 TABLET, FILM COATED ORAL at 21:18

## 2022-11-04 RX ADMIN — GABAPENTIN 300 MG: 300 CAPSULE ORAL at 21:17

## 2022-11-04 RX ADMIN — CLOPIDOGREL BISULFATE 75 MG: 75 TABLET ORAL at 10:32

## 2022-11-04 RX ADMIN — Medication 10 ML: at 21:19

## 2022-11-04 RX ADMIN — ENOXAPARIN SODIUM 40 MG: 100 INJECTION SUBCUTANEOUS at 10:32

## 2022-11-04 RX ADMIN — INSULIN GLARGINE 20 UNITS: 100 INJECTION, SOLUTION SUBCUTANEOUS at 21:19

## 2022-11-04 RX ADMIN — CEFTRIAXONE SODIUM 1000 MG: 1 INJECTION, POWDER, FOR SOLUTION INTRAMUSCULAR; INTRAVENOUS at 17:16

## 2022-11-04 RX ADMIN — POLYETHYLENE GLYCOL 3350 17 G: 17 POWDER, FOR SOLUTION ORAL at 10:32

## 2022-11-04 RX ADMIN — Medication 10 ML: at 10:33

## 2022-11-04 RX ADMIN — AMLODIPINE BESYLATE 10 MG: 5 TABLET ORAL at 10:32

## 2022-11-04 ASSESSMENT — ENCOUNTER SYMPTOMS
EYE DISCHARGE: 0
EYE REDNESS: 0
NAUSEA: 0
CONSTIPATION: 0
SHORTNESS OF BREATH: 0
DIARRHEA: 0
RHINORRHEA: 0
SINUS PRESSURE: 0
WHEEZING: 0
SORE THROAT: 0
COUGH: 0
SINUS PAIN: 0
ABDOMINAL PAIN: 0
BACK PAIN: 0

## 2022-11-04 ASSESSMENT — PAIN SCALES - GENERAL
PAINLEVEL_OUTOF10: 0

## 2022-11-04 NOTE — PROGRESS NOTES
Office : [phone]     Fax :[phone]       Nephrology progress  Note      Patient's Name: Earnestine Coleman Saturday  9:48 AM  11/4/2022    Reason for Consult: ANABEL      Requesting Physician:  STACEY Robles CNP      Chief Complaint:    Chief Complaint   Patient presents with    Hyperglycemia     Pt reports blood sugar in 300. Pt does not take insulin but does take metformin. Pt is from home. Pt denies n/v. Pt reports neuropathy. History of Present Ilness:    Earnestine Coleman Saturday is a 65 y.o. female with prior history of HTN, DM 2, CVA who was admitted for hyperglycemia and ANABEL. She presented with complaints of elevated blood sugar. Patient said she did not take any insulin and metformin due to not feeling well. Patient said this morning she checked her glucose it was around 200 so this afternoon rechecked again it was up to 300. Patient also complaining of dry mouth and feeling excessive thirst.  Patient was recently seen mental health for dizziness which had negative CT scan. With no acute finding. Patient says still having intermittent dizziness related to head movement. Patient denies any fevers chills no nausea vomiting does have some dysuria. Nothing that makes it better or worse. Initial blood work shows elevated creat of 1.6 . Baseline is 0.8       Interval hx :    Renal function better   BP elevated     No SOB         I/O last 3 completed shifts: In: 300 [P.O.:300]  Out: 200 [Urine:1900]    Past Medical History:   Diagnosis Date    Diabetes mellitus ([address]     Hypertension     Hypertension     Stroke (cerebrum) ([address] 2001       Past Surgical History:   Procedure Laterality Date    CHOLECYSTECTOMY         History reviewed.  No pertinent family history. reports that she has never smoked. She has never used smokeless tobacco. She reports that she does not drink alcohol and does not use drugs. Allergies:  Patient has no known allergies.     Current Medications:    meclizine (ANTIVERT) tablet 12.5 mg, TID PRN  enoxaparin (LOVENOX) injection 40 mg, Daily  amLODIPine (NORVASC) tablet 10 mg, Daily  atorvastatin (LIPITOR) tablet 40 mg, Nightly  clopidogrel (PLAVIX) tablet 75 mg, Daily  gabapentin (NEURONTIN) capsule 300 mg, BID  insulin glargine (LANTUS) injection vial 20 Units, Nightly  cefTRIAXone (ROCEPHIN) 1,000 mg in dextrose 5 % 50 mL IVPB mini-bag, Q24H  dextrose bolus 10% 125 mL, PRN   Or  dextrose bolus 10% 250 mL, PRN  glucagon (rDNA) injection 1 mg, PRN  dextrose 10 % infusion, Continuous PRN  sodium chloride flush 0.9 % injection 5-40 mL, 2 times per day  sodium chloride flush 0.9 % injection 5-40 mL, PRN  0.9 % sodium chloride infusion, PRN  ondansetron (ZOFRAN-ODT) disintegrating tablet 4 mg, Q8H PRN   Or  ondansetron (ZOFRAN) injection 4 mg, Q6H PRN  polyethylene glycol (GLYCOLAX) packet 17 g, Daily PRN  acetaminophen (TYLENOL) tablet 650 mg, Q6H PRN   Or  acetaminophen (TYLENOL) suppository 650 mg, Q6H PRN  insulin lispro (HUMALOG) injection vial 0-8 Units, TID WC  insulin lispro (HUMALOG) injection vial 0-4 Units, Nightly        Physical exam:     Vitals:  BP (!) 162/83   Pulse 76   Temp 98.2 °F (36.8 °C) (Oral)   Resp 16   Ht 5' 5\" (1.651 m)   Wt 178 lb 14.4 oz (81.1 kg)   SpO2 96%   BMI 29.77 kg/m²   Constitutional:  OAA X3 NAD  Skin: no rash, turgor wnl  Heent:  eomi, mmm  Neck: no bruits or jvd noted  Cardiovascular:  S1, S2 without m/r/g  Respiratory: CTA B without w/r/r  Abdomen:  +bs, soft, nt, nd  Ext: no  lower extremity edema  Psychiatric: mood and affect appropriate  Musculoskeletal:  Rom, muscular strength intact    Labs:  CBC:   Recent Labs     11/02/22  1300 11/03/22  0423 11/04/22  0433   WBC 9.0 6.8 5.7   HGB 10.5* 9.6* 10.3*    329 355     BMP:    Recent Labs     11/02/22  1300 11/03/22  0423 11/04/22  0433    140 140   K 3.8 3.6 3.6   CL 97* 105 106   CO2 27 26 27   BUN 47* 30* 17   CREATININE 1.6* 0.8 0.6   GLUCOSE 247* 121* 102*     Ca/Mg/Phos:   Recent Labs     11/02/22  1300 11/03/22  0423 11/04/22  0433   CALCIUM 9.7 9.1 9.3     Hepatic:   Recent Labs     11/02/22  1300   AST 10*   ALT 10   BILITOT 0.3   ALKPHOS 85     Troponin: No results for input(s): TROPONINI in the last 72 hours. BNP: No results for input(s): BNP in the last 72 hours. Lipids: No results for input(s): CHOL, TRIG, HDL, LDLCALC, LABVLDL in the last 72 hours. ABGs: No results for input(s): PHART, PO2ART, IKD9IQY in the last 72 hours. INR: No results for input(s): INR in the last 72 hours. UA:  Recent Labs     11/02/22  1418   COLORU DARK YELLOW*   CLARITYU CLOUDY*   GLUCOSEU Negative   BILIRUBINUR SMALL*   KETUA TRACE*   SPECGRAV 1.025   BLOODU SMALL*   PHUR 5.0   PROTEINU 100*   UROBILINOGEN 1.0   NITRU Negative   LEUKOCYTESUR LARGE*   LABMICR YES   URINETYPE NotGiven      Urine Microscopic:   Recent Labs     11/02/22  1418   BACTERIA None Seen   HYALCAST 54*   WBCUA 162*   RBCUA 2   EPIU 9*     Urine Culture:   Recent Labs     11/02/22  1418   LABURIN >100,000 CFU/ml     Urine Chemistry: No results for input(s): CLUR, LABCREA, PROTEINUR, NAUR in the last 72 hours. IMAGING:  MRI BRAIN WO CONTRAST   Final Result   1. No acute intracranial abnormality. No acute infarct. 2. Mild global parenchymal volume loss with moderate chronic microvascular   ischemic changes. 3. Small chronic infarct involving the left posterior frontal/parietal lobe. 4. Chronic lacunar infarcts involving the right thalamus and right lexi. Assessment/Plan :      1.  ANABEL   Likely pre renal   Hold lasix   Hold jardiance       Recommend to dose adjust all medications  based on renal functions  Maintain SBP> 90 mmHg Daily weights   AVOID NSAIDs  Avoid Nephrotoxins  Monitor Intake/Output  Call if significant decrease in urine output      2. UTI. Abx. 3. HTN. Resume lisinopril. Low sodium diet.      D/w primary team      Thank you for allowing us to participate in care of [facility] Saturday         Electronically signed by: Joselito Mera MD, 11/4/2022, 9:48 AM      [facility] of [address] [place] [address] S  Office : [phone]  Fax :[phone]

## 2022-11-04 NOTE — PROGRESS NOTES
[facility] PROGRESS NOTE    11/4/2022 9:31 AM        Name: Annelise Lord Saturday . Admitted: 11/2/2022  Primary Care Provider: STACEY Epps CNP (Tel: [phone])                        Subjective:  . No acute events overnight. Resting well. Pain control. Diet ok. Labs reviewed  Denies any chest pain sob.      Reviewed interval ancillary notes    Current Medications  meclizine (ANTIVERT) tablet 12.5 mg, TID PRN  enoxaparin (LOVENOX) injection 40 mg, Daily  amLODIPine (NORVASC) tablet 10 mg, Daily  atorvastatin (LIPITOR) tablet 40 mg, Nightly  clopidogrel (PLAVIX) tablet 75 mg, Daily  gabapentin (NEURONTIN) capsule 300 mg, BID  insulin glargine (LANTUS) injection vial 20 Units, Nightly  cefTRIAXone (ROCEPHIN) 1,000 mg in dextrose 5 % 50 mL IVPB mini-bag, Q24H  dextrose bolus 10% 125 mL, PRN   Or  dextrose bolus 10% 250 mL, PRN  glucagon (rDNA) injection 1 mg, PRN  dextrose 10 % infusion, Continuous PRN  sodium chloride flush 0.9 % injection 5-40 mL, 2 times per day  sodium chloride flush 0.9 % injection 5-40 mL, PRN  0.9 % sodium chloride infusion, PRN  ondansetron (ZOFRAN-ODT) disintegrating tablet 4 mg, Q8H PRN   Or  ondansetron (ZOFRAN) injection 4 mg, Q6H PRN  polyethylene glycol (GLYCOLAX) packet 17 g, Daily PRN  acetaminophen (TYLENOL) tablet 650 mg, Q6H PRN   Or  acetaminophen (TYLENOL) suppository 650 mg, Q6H PRN  insulin lispro (HUMALOG) injection vial 0-8 Units, TID WC  insulin lispro (HUMALOG) injection vial 0-4 Units, Nightly      Objective:  BP (!) 162/83   Pulse 76   Temp 98.2 °F (36.8 °C) (Oral)   Resp 16   Ht 5' 5\" (1.651 m)   Wt 178 lb 14.4 oz (81.1 kg)   SpO2 96%   BMI 29.77 kg/m²     Intake/Output Summary (Last 24 hours) at 11/4/2022 0931  Last data filed at 11/4/2022 0517  Gross per 24 hour   Intake --   Output 1300 ml   Net -1300 ml Wt Readings from Last 3 Encounters:   11/04/22 178 lb 14.4 oz (81.1 kg)   08/20/22 173 lb 8 oz (78.7 kg)   04/05/22 156 lb (70.8 kg)       General appearance:  Appears comfortable  Eyes: Sclera clear. Pupils equal.  ENT: Moist oral mucosa. Trachea midline, no adenopathy. Cardiovascular: Regular rhythm, normal S1, S2. No murmur. No edema in lower extremities  Respiratory: Not using accessory muscles. Good inspiratory effort. Clear to auscultation bilaterally, no wheeze or crackles. GI: Abdomen soft, no tenderness, not distended, normal bowel sounds  Musculoskeletal: No cyanosis in digits, neck supple  Neurology: CN 2-12 grossly intact. No speech or motor deficits  Psych: Normal affect. Alert and oriented in time, place and person  Skin: Warm, dry, normal turgor    Labs and Tests:  CBC:   Recent Labs     11/02/22  1300 11/03/22  0423 11/04/22  0433   WBC 9.0 6.8 5.7   HGB 10.5* 9.6* 10.3*    329 355     BMP:    Recent Labs     11/02/22  1300 11/03/22  0423 11/04/22  0433    140 140   K 3.8 3.6 3.6   CL 97* 105 106   CO2 27 26 27   BUN 47* 30* 17   CREATININE 1.6* 0.8 0.6   GLUCOSE 247* 121* 102*     Hepatic:   Recent Labs     11/02/22  1300   AST 10*   ALT 10   BILITOT 0.3   ALKPHOS 85       Discussed care with patient             Problem List  Principal Problem:    Urinary tract infection without hematuria  Active Problems:    Hyperglycemia due to diabetes mellitus ([address]    Overweight (BMI 25.0-29. 9)    History of stroke    Positive blood cultures    ANABEL (acute kidney injury) ([address]    Acute cystitis without hematuria    Type 2 diabetes mellitus with other specified complication ([address]    Essential hypertension    Hyperlipidemia  Resolved Problems:    * No resolved hospital problems.  *       Assessment & Plan:   Dizziness  -Patient still with persistent dizziness  -MRI to rule out stroke meclizine as needed doses previous history of CVA and also dizziness    UTI  -Continue IV antibiotic  -Urine culture growing E. coli first    Bacteremia  -Blood culture growing gram-positive cocci  -Could be contaminant versus true  -Following repeat blood culture ordered we will follow-up further  Cardiogram ordered    Acute kidney injury improving fluid hold      Diet: ADULT DIET;  Regular; 5 carb choices (75 gm/meal)  Code:Full Code  DVT PPX yrn Soares MD   11/4/2022 9:31 AM

## 2022-11-04 NOTE — PLAN OF CARE
Problem: Skin/Tissue Integrity  Goal: Absence of new skin breakdown  Description: 1. Monitor for areas of redness and/or skin breakdown  2. Assess vascular access sites hourly  3. Every 4-6 hours minimum:  Change oxygen saturation probe site  4. Every 4-6 hours:  If on nasal continuous positive airway pressure, respiratory therapy assess nares and determine need for appliance change or resting period.   11/4/2022 1123 by Chris Rivera RN  Outcome: Progressing  11/4/2022 0001 by Steven Washington RN  Outcome: Progressing

## 2022-11-04 NOTE — PROGRESS NOTES
Infectious Diseases   Progress Note      Admission Date: 11/2/2022  Hospital Day: Hospital Day: 3   Attending: Zainab Hilario MD  Date of service: 11/4/2022     Chief complaint/ Reason for consult:       Dizziness episodes  E. coli urinary tract infection  Positive blood cultures for coagulase-negative spinal coccus, likely contaminant from the skin  Chronic cerebrovascular ischemic disease with history of strokes  Type 2 diabetes mellitus    Microbiology:        I have reviewed allavailable micro lab data and cultures    Blood culture (2/2) - collected on 11/2/2022: Coagulase-negative Staphylococcus    Urine culture  - collected on 11/2/2022: Greater than 100, 000 CFU per mL of E. coli    Susceptibility    Escherichia coli (1)    Antibiotic Interpretation Microscan  Method Status    ampicillin Sensitive <=2 mcg/mL BACTERIAL SUSCEPTIBILITY PANEL BY DAKSHA     ampicillin-sulbactam Sensitive <=2 mcg/mL BACTERIAL SUSCEPTIBILITY PANEL BY DAKSHA     ceFAZolin Sensitive <=4 mcg/mL BACTERIAL SUSCEPTIBILITY PANEL BY DAKSHA      NOTE: Cefazolin should only be used for uncomplicated UTI         for E.coli or Klebsiella pneumoniae.         cefepime Sensitive <=0.12 mcg/mL BACTERIAL SUSCEPTIBILITY PANEL BY DAKSHA     cefTRIAXone Sensitive <=0.25 mcg/mL BACTERIAL SUSCEPTIBILITY PANEL BY DAKSHA     ciprofloxacin Resistant >=4 mcg/mL BACTERIAL SUSCEPTIBILITY PANEL BY DAKSHA     ertapenem Sensitive <=0.12 mcg/mL BACTERIAL SUSCEPTIBILITY PANEL BY DAKSHA     gentamicin Sensitive <=1 mcg/mL BACTERIAL SUSCEPTIBILITY PANEL BY DAKSHA     levofloxacin Resistant >=8 mcg/mL BACTERIAL SUSCEPTIBILITY PANEL BY DAKSHA     nitrofurantoin Sensitive <=16 mcg/mL BACTERIAL SUSCEPTIBILITY PANEL BY DAKSHA     piperacillin-tazobactam Sensitive <=4 mcg/mL BACTERIAL SUSCEPTIBILITY PANEL BY DAKSHA     trimethoprim-sulfamethoxazole Sensitive <=20 mcg/mL BACTERIAL SUSCEPTIBILITY PANEL BY DAKSHA        Narrative  Performed by: [facility]  ORDER#: [id number] ORDERED BY: DERECK OMALLEY   SOURCE: Urine Clean Catch                  COLLECTED:  11/02/22 14:18   ANTIBIOTICS AT NATHANIEL.:                      RECEIVED :  11/02/22 21:06     Antibiotics and immunizations:       Current antibiotics: All antibiotics and their doses were reviewed by me    Recent Abx Admin                     cefTRIAXone (ROCEPHIN) 1,000 mg in dextrose 5 % 50 mL IVPB mini-bag (mg) 1,000 mg New Bag 11/03/22 1644                      Immunization History: All immunization history was reviewed by me today. There is no immunization history on file for this patient. Known drug allergies: All allergies were reviewed and updated    No Known Allergies    Social history:     Social History:  All social andepidemiologic history was reviewed and updated by me today as needed. Tobacco use:   reports that she has never smoked. She has never used smokeless tobacco.  Alcohol use:   reports no history of alcohol use. Currently lives in: [address] [place] [address]   reports no history of drug use. COVID VACCINATION AND LAB RESULT RECORDS:     Internal Administration   First Dose      Second Dose           Last COVID Lab No results found for: SARS-COV-2, SARS-COV-2 RNA, SARS-COV-2, SARS-COV-2, SARS-COV-2 BY PCR, SARS-COV-2, SARS-COV-2, SARS-COV-2         Assessment:     The patient is a 65 y.o. old female who  has a past medical history of Diabetes mellitus ([facility] [place] [address] Hypertension, Hypertension, and Stroke (cerebrum) ([facility] [place] [address] (2001). with following problems:    Dizziness episodes - on and off  E. coli urinary tract infection -E. coli sensitivities reviewed  Positive blood cultures for coagulase-negative Staphylococcus likely contaminant from the skin-cannot rule out   Chronic cerebrovascular ischemic disease with history of strokes  Type 2 diabetes mellitus-maintain good glycemic control  Essential hypertension  Hyperlipidemia  Overweight due to excess calorie intake :  Body mass index is 29.24 kg/m²      Discussion:      The patient is on IV ceftriaxone. Sensitivity of E. coli isolated from the urine culture reviewed. It was fluoroquinolone resistant    Blood cultures of 11-2-22 grew coagulase-negative Staphylococcus. It is unclear if the cultures were drawn from the same site on 2 different sites. The date and time of the draw is the same. White cell count is 5700 today. Platelet count 200,361      Plan:     Diagnostic Workup: Will order 2 sets of repeat blood cultures today  Continue to follow  fever curve, WBC count and blood cultures. Continue to monitor blood counts, liver and renal function. Antimicrobials: Will continue IV ceftriaxone 2 g every 24 hours  If the repeat blood cultures are negative, will plan to switch her to oral antibiotic at discharge  We will follow up on the culture results and clinical progress and will make further recommendations accordingly. Continue close vitals monitoring. Maintain good glycemic control. Fall precautions. Aspiration precautions. Continue to watch for new fever or diarrhea. DVT prophylaxis. Discussed all above with patient and RN. Drug Monitoring:    Continue monitoring for antibiotic toxicity as follows: CBC, CMP   Continue to watch for following: new or worsening fever, new hypotension, hives, lip swelling and redness or purulence at vascular access sites. I/v access Management:    Continue to monitor i.v access sites for erythema, induration, discharge or tenderness. As always, continue efforts to minimize tubes/lines/drains as clinically appropriate to reduce chances of line associated infections. Patient education and counseling: The patient was educated in detail about the side-effects of various antibiotics and things to watch for like new rashes, lip swelling, severe reaction, worsening diarrhea, break through fever etc.  Discussed patient's condition and what to expect.  All of the patient's questions were addressed in a satisfactory manner and patient verbalized understanding all instructions. Level of complexity of visit and medical decision making: High     TIME SPENT TODAY:     - Spent over  35 minutes on visit (including interval history, physical exam, review of data including labs, cultures, imaging, development and implementation of treatment plan and coordination of complex care). More than 50 percent of this includes face-to-face time spent with the patient for counseling and coordination of care. Thank you for involving me in the care of your patient. I will continue to follow. If you have anyadditional questions, please do not hesitate to contact me. Subjective: Interval history: Interval history was obtained from chart review and patient/ RN. The patient Feels tired. Dizziness is resolved. She is tolerating antibiotics okay      REVIEW OF SYSTEMS:      Review of Systems   Constitutional:  Positive for fatigue. Negative for chills, diaphoresis and fever. HENT:  Negative for ear discharge, ear pain, postnasal drip, rhinorrhea, sinus pressure, sinus pain and sore throat. Eyes:  Negative for discharge and redness. Respiratory:  Negative for cough, shortness of breath and wheezing. Cardiovascular:  Negative for chest pain and leg swelling. Gastrointestinal:  Negative for abdominal pain, constipation, diarrhea and nausea. Endocrine: Negative for cold intolerance, heat intolerance and polydipsia. Genitourinary:  Negative for dysuria, flank pain, frequency, hematuria and urgency. Musculoskeletal:  Negative for back pain and myalgias. Skin:  Negative for rash. Allergic/Immunologic: Negative for immunocompromised state. Neurological:  Negative for dizziness, seizures and headaches. Hematological:  Does not bruise/bleed easily. Psychiatric/Behavioral:  Negative for agitation, hallucinations and suicidal ideas. The patient is not nervous/anxious.     All other systems reviewed and are negative. Past Medical History: All past medical history reviewed today. Past Medical History:   Diagnosis Date    Diabetes mellitus ([facility] [address]     Hypertension     Hypertension     Stroke (cerebrum) ([facility] [address] 2001       Past Surgical History: All past surgical history was reviewed today. Past Surgical History:   Procedure Laterality Date    CHOLECYSTECTOMY         Family History: All family history was reviewed today. History reviewed. No pertinent family history. Objective:       PHYSICAL EXAM:      Vitals:   Vitals:    11/04/22 0227 11/04/22 0429 11/04/22 0603 11/04/22 0821   BP:  (!) 172/92  (!) 162/83   Pulse: 69 79 78 76   Resp:  16  16   Temp:  98.2 °F (36.8 °C)  98.2 °F (36.8 °C)   TempSrc:  Oral  Oral   SpO2:  97%  96%   Weight:       Height:           Physical Exam  Vitals and nursing note reviewed. Constitutional:       Appearance: She is well-developed. She is not diaphoretic. Comments: The patient was seen earlier today. HENT:      Head: Normocephalic and atraumatic. Right Ear: External ear normal. There is no impacted cerumen. Left Ear: External ear normal. There is no impacted cerumen. Nose: Nose normal.      Mouth/Throat:      Mouth: Mucous membranes are moist.      Pharynx: Oropharynx is clear. No oropharyngeal exudate. Eyes:      General: No scleral icterus. Right eye: No discharge. Left eye: No discharge. Conjunctiva/sclera: Conjunctivae normal.      Pupils: Pupils are equal, round, and reactive to light. Neck:      Thyroid: No thyromegaly. Cardiovascular:      Rate and Rhythm: Normal rate and regular rhythm. Heart sounds: Normal heart sounds. No murmur heard. No friction rub. Pulmonary:      Effort: No respiratory distress. Breath sounds: No stridor. No wheezing or rales. Abdominal:      General: Bowel sounds are normal.      Palpations: Abdomen is soft. Tenderness: There is no abdominal tenderness. There is no guarding or rebound. Musculoskeletal:         General: No swelling, tenderness or deformity. Normal range of motion. Cervical back: Normal range of motion and neck supple. Right lower leg: No edema. Left lower leg: No edema. Lymphadenopathy:      Cervical: No cervical adenopathy. Skin:     General: Skin is warm and dry. Coloration: Skin is not jaundiced. Findings: No bruising, erythema or rash. Neurological:      General: No focal deficit present. Mental Status: She is alert and oriented to person, place, and time. Mental status is at baseline. Motor: No abnormal muscle tone. Psychiatric:         Mood and Affect: Mood normal.         Behavior: Behavior normal.          Lines and drains: All vascular access sites are healthy with no local erythema, discharge or tenderness. Intake and output:    I/O last 3 completed shifts: In: 300 [P.O.:300]  Out: 1900 [Urine:1900]    Lab Data:   All available labs and old records have been reviewed by me. CBC:  Recent Labs     11/02/22  1300 11/03/22  0423 11/04/22 0433   WBC 9.0 6.8 5.7   RBC 4.08 3.67* 4.05   HGB 10.5* 9.6* 10.3*   HCT 32.2* 29.1* 32.7*    329 355   MCV 79.0* 79.3* 80.7   MCH 25.8* 26.0 25.5*   MCHC 32.7 32.8 31.6   RDW 13.8 13.5 13.7        BMP:  Recent Labs     11/02/22  1300 11/03/22  0423 11/04/22 0433    140 140   K 3.8 3.6 3.6   CL 97* 105 106   CO2 27 26 27   BUN 47* 30* 17   CREATININE 1.6* 0.8 0.6   CALCIUM 9.7 9.1 9.3   GLUCOSE 247* 121* 102*        Hepatic Function Panel:   Lab Results   Component Value Date/Time    ALKPHOS 85 11/02/2022 01:00 PM    ALT 10 11/02/2022 01:00 PM    AST 10 11/02/2022 01:00 PM    PROT 8.2 11/02/2022 01:00 PM    BILITOT 0.3 11/02/2022 01:00 PM    LABALBU 3.3 11/02/2022 01:00 PM       CPK:   Lab Results   Component Value Date    CKTOTAL 86 09/16/2021     ESR: No results found for: SEDRATE  CRP: No results found for: CRP        Imaging:     All pertinent images and reports for the current visit were reviewed by me during this visit. I reviewed the chest x-ray/CT scan/MRI images and independently interpreted the findings and results today. MRI BRAIN WO CONTRAST   Final Result   1. No acute intracranial abnormality. No acute infarct. 2. Mild global parenchymal volume loss with moderate chronic microvascular   ischemic changes. 3. Small chronic infarct involving the left posterior frontal/parietal lobe. 4. Chronic lacunar infarcts involving the right thalamus and right lexi. Medications: All current and past medications were reviewed. lisinopril  10 mg Oral Daily    enoxaparin  40 mg SubCUTAneous Daily    amLODIPine  10 mg Oral Daily    atorvastatin  40 mg Oral Nightly    clopidogrel  75 mg Oral Daily    gabapentin  300 mg Oral BID    insulin glargine  20 Units SubCUTAneous Nightly    cefTRIAXone (ROCEPHIN) IV  1,000 mg IntraVENous Q24H    sodium chloride flush  5-40 mL IntraVENous 2 times per day    insulin lispro  0-8 Units SubCUTAneous TID WC    insulin lispro  0-4 Units SubCUTAneous Nightly        dextrose      sodium chloride         meclizine, dextrose bolus **OR** dextrose bolus, glucagon (rDNA), dextrose, sodium chloride flush, sodium chloride, ondansetron **OR** ondansetron, polyethylene glycol, acetaminophen **OR** acetaminophen      Problem list:       Patient Active Problem List   Diagnosis Code    Neck mass R22.1    TIA (transient ischemic attack) G45.9    Type 2 diabetes mellitus with other specified complication (HCC) B93.76    Essential hypertension I10    Hyperlipidemia E78.5    Weakness R53.1    Dizziness R42    Hypokalemia E87.6    Hypomagnesemia E83.42    Urinary tract infection without hematuria N39.0    Hyperglycemia due to diabetes mellitus (HCC) E11.65    Overweight (BMI 25.0-29. 9) E66.3    History of stroke Z86.73    Positive blood cultures R78.81    ANABEL (acute kidney injury) ([facility] [address] N17.9    Acute cystitis without hematuria N30.00       Please note that this chart was generated using Dragon dictation software. Although every effort was made to ensure the accuracy of this automated transcription, some errors in transcription may have occurred inadvertently. If you may need any clarification, please do not hesitate to contact me through EPIC or at the phone number provided below with my electronic signature. Any pictures or media included in this note were obtained after taking informed verbal consent from the patient and with their approval to include those in the patient's medical record. Adrienne Bazan MD, MPH, [address] [place] [address]  11/4/2022, 10:35 AM  [facility]   [address] [place] [address], [address] [place] [address]  Office: [phone]  Fax: [phone]  In-person Clinic days:  Tuesday & Thursday a.m. Virtual clinic days: Monday, Wednesday & Friday a.m.

## 2022-11-05 PROBLEM — Z71.3 WEIGHT LOSS COUNSELING, ENCOUNTER FOR: Status: ACTIVE | Noted: 2022-11-05

## 2022-11-05 PROBLEM — Z71.89 DIABETES EDUCATION, ENCOUNTER FOR: Status: ACTIVE | Noted: 2022-11-05

## 2022-11-05 LAB
ANION GAP SERPL CALCULATED.3IONS-SCNC: 10 MMOL/L (ref 3–16)
BLOOD CULTURE, ROUTINE: ABNORMAL
BUN BLDV-MCNC: 14 MG/DL (ref 7–20)
CALCIUM SERPL-MCNC: 9.3 MG/DL (ref 8.3–10.6)
CHLORIDE BLD-SCNC: 105 MMOL/L (ref 99–110)
CO2: 28 MMOL/L (ref 21–32)
CREAT SERPL-MCNC: 0.7 MG/DL (ref 0.6–1.2)
CULTURE, BLOOD 2: ABNORMAL
CULTURE, BLOOD 2: ABNORMAL
GFR SERPL CREATININE-BSD FRML MDRD: >60 ML/MIN/{1.73_M2}
GLUCOSE BLD-MCNC: 144 MG/DL (ref 70–99)
GLUCOSE BLD-MCNC: 154 MG/DL (ref 70–99)
GLUCOSE BLD-MCNC: 176 MG/DL (ref 70–99)
GLUCOSE BLD-MCNC: 199 MG/DL (ref 70–99)
GLUCOSE BLD-MCNC: 81 MG/DL (ref 70–99)
GLUCOSE BLD-MCNC: 89 MG/DL (ref 70–99)
HCT VFR BLD CALC: 33 % (ref 36–48)
HEMOGLOBIN: 10.7 G/DL (ref 12–16)
MCH RBC QN AUTO: 25.4 PG (ref 26–34)
MCHC RBC AUTO-ENTMCNC: 32.4 G/DL (ref 31–36)
MCV RBC AUTO: 78.4 FL (ref 80–100)
ORGANISM: ABNORMAL
PDW BLD-RTO: 13.4 % (ref 12.4–15.4)
PERFORMED ON: ABNORMAL
PERFORMED ON: NORMAL
PLATELET # BLD: 366 K/UL (ref 135–450)
PMV BLD AUTO: 6.9 FL (ref 5–10.5)
POTASSIUM SERPL-SCNC: 3.6 MMOL/L (ref 3.5–5.1)
RBC # BLD: 4.21 M/UL (ref 4–5.2)
SODIUM BLD-SCNC: 143 MMOL/L (ref 136–145)
WBC # BLD: 5.4 K/UL (ref 4–11)

## 2022-11-05 PROCEDURE — 1200000000 HC SEMI PRIVATE

## 2022-11-05 PROCEDURE — 36415 COLL VENOUS BLD VENIPUNCTURE: CPT

## 2022-11-05 PROCEDURE — 6360000002 HC RX W HCPCS: Performed by: HOSPITALIST

## 2022-11-05 PROCEDURE — 6370000000 HC RX 637 (ALT 250 FOR IP): Performed by: HOSPITALIST

## 2022-11-05 PROCEDURE — 99233 SBSQ HOSP IP/OBS HIGH 50: CPT | Performed by: INTERNAL MEDICINE

## 2022-11-05 PROCEDURE — 6370000000 HC RX 637 (ALT 250 FOR IP): Performed by: INTERNAL MEDICINE

## 2022-11-05 PROCEDURE — 2580000003 HC RX 258: Performed by: HOSPITALIST

## 2022-11-05 PROCEDURE — 80048 BASIC METABOLIC PNL TOTAL CA: CPT

## 2022-11-05 PROCEDURE — 85027 COMPLETE CBC AUTOMATED: CPT

## 2022-11-05 RX ADMIN — AMLODIPINE BESYLATE 10 MG: 5 TABLET ORAL at 07:53

## 2022-11-05 RX ADMIN — Medication 10 ML: at 21:18

## 2022-11-05 RX ADMIN — ENOXAPARIN SODIUM 40 MG: 100 INJECTION SUBCUTANEOUS at 07:54

## 2022-11-05 RX ADMIN — ATORVASTATIN CALCIUM 40 MG: 40 TABLET, FILM COATED ORAL at 21:18

## 2022-11-05 RX ADMIN — GABAPENTIN 300 MG: 300 CAPSULE ORAL at 07:53

## 2022-11-05 RX ADMIN — Medication 10 ML: at 07:52

## 2022-11-05 RX ADMIN — CLOPIDOGREL BISULFATE 75 MG: 75 TABLET ORAL at 07:53

## 2022-11-05 RX ADMIN — GABAPENTIN 300 MG: 300 CAPSULE ORAL at 21:18

## 2022-11-05 RX ADMIN — LISINOPRIL 10 MG: 10 TABLET ORAL at 07:53

## 2022-11-05 RX ADMIN — CEFTRIAXONE SODIUM 1000 MG: 1 INJECTION, POWDER, FOR SOLUTION INTRAMUSCULAR; INTRAVENOUS at 16:59

## 2022-11-05 RX ADMIN — INSULIN GLARGINE 20 UNITS: 100 INJECTION, SOLUTION SUBCUTANEOUS at 21:19

## 2022-11-05 ASSESSMENT — ENCOUNTER SYMPTOMS
SORE THROAT: 0
WHEEZING: 0
EYE REDNESS: 0
NAUSEA: 0
BACK PAIN: 0
EYE DISCHARGE: 0
SHORTNESS OF BREATH: 0
ABDOMINAL PAIN: 0
DIARRHEA: 0
SINUS PAIN: 0
SINUS PRESSURE: 0
RHINORRHEA: 0
COUGH: 0
CONSTIPATION: 0

## 2022-11-05 ASSESSMENT — PAIN DESCRIPTION - ORIENTATION: ORIENTATION: MID

## 2022-11-05 ASSESSMENT — PAIN SCALES - GENERAL
PAINLEVEL_OUTOF10: 0

## 2022-11-05 ASSESSMENT — PAIN DESCRIPTION - DESCRIPTORS: DESCRIPTORS: ACHING

## 2022-11-05 ASSESSMENT — PAIN DESCRIPTION - LOCATION: LOCATION: BACK

## 2022-11-05 NOTE — PROGRESS NOTES
[facility] PROGRESS NOTE    11/5/2022 8:35 AM        Name: Nesha Carpio Saturday . Admitted: 11/2/2022  Primary Care Provider: STACEY Burnett CNP (Tel: [phone])                        Subjective:  . No acute events overnight. Resting well. Pain control. Diet ok. Labs reviewed  Denies any chest pain sob.      Reviewed interval ancillary notes    Current Medications  lisinopril (PRINIVIL;ZESTRIL) tablet 10 mg, Daily  meclizine (ANTIVERT) tablet 12.5 mg, TID PRN  enoxaparin (LOVENOX) injection 40 mg, Daily  amLODIPine (NORVASC) tablet 10 mg, Daily  atorvastatin (LIPITOR) tablet 40 mg, Nightly  clopidogrel (PLAVIX) tablet 75 mg, Daily  gabapentin (NEURONTIN) capsule 300 mg, BID  insulin glargine (LANTUS) injection vial 20 Units, Nightly  cefTRIAXone (ROCEPHIN) 1,000 mg in dextrose 5 % 50 mL IVPB mini-bag, Q24H  dextrose bolus 10% 125 mL, PRN   Or  dextrose bolus 10% 250 mL, PRN  glucagon (rDNA) injection 1 mg, PRN  dextrose 10 % infusion, Continuous PRN  sodium chloride flush 0.9 % injection 5-40 mL, 2 times per day  sodium chloride flush 0.9 % injection 5-40 mL, PRN  0.9 % sodium chloride infusion, PRN  ondansetron (ZOFRAN-ODT) disintegrating tablet 4 mg, Q8H PRN   Or  ondansetron (ZOFRAN) injection 4 mg, Q6H PRN  polyethylene glycol (GLYCOLAX) packet 17 g, Daily PRN  acetaminophen (TYLENOL) tablet 650 mg, Q6H PRN   Or  acetaminophen (TYLENOL) suppository 650 mg, Q6H PRN  insulin lispro (HUMALOG) injection vial 0-8 Units, TID WC  insulin lispro (HUMALOG) injection vial 0-4 Units, Nightly      Objective:  BP (!) 155/91   Pulse 80   Temp 98.3 °F (36.8 °C) (Oral)   Resp 16   Ht 5' 5\" (1.651 m)   Wt 170 lb 12.8 oz (77.5 kg)   SpO2 96%   BMI 28.42 kg/m²     Intake/Output Summary (Last 24 hours) at 11/5/2022 0835  Last data filed at 11/4/2022 2231  Gross per 24 hour   Intake --   Output 1850 ml   Net -1850 ml      Wt Readings from Last 3 Encounters:   11/05/22 170 lb 12.8 oz (77.5 kg)   08/20/22 173 lb 8 oz (78.7 kg)   04/05/22 156 lb (70.8 kg)       General appearance:  Appears comfortable  Eyes: Sclera clear. Pupils equal.  ENT: Moist oral mucosa. Trachea midline, no adenopathy. Cardiovascular: Regular rhythm, normal S1, S2. No murmur. No edema in lower extremities  Respiratory: Not using accessory muscles. Good inspiratory effort. Clear to auscultation bilaterally, no wheeze or crackles. GI: Abdomen soft, no tenderness, not distended, normal bowel sounds  Musculoskeletal: No cyanosis in digits, neck supple  Neurology: CN 2-12 grossly intact. No speech or motor deficits  Psych: Normal affect. Alert and oriented in time, place and person  Skin: Warm, dry, normal turgor    Labs and Tests:  CBC:   Recent Labs     11/03/22 0423 11/04/22  0433 11/05/22  0509   WBC 6.8 5.7 5.4   HGB 9.6* 10.3* 10.7*    355 366     BMP:    Recent Labs     11/03/22 0423 11/04/22  0433 11/05/22  0509    140 143   K 3.6 3.6 3.6    106 105   CO2 26 27 28   BUN 30* 17 14   CREATININE 0.8 0.6 0.7   GLUCOSE 121* 102* 81     Hepatic:   Recent Labs     11/02/22  1300   AST 10*   ALT 10   BILITOT 0.3   ALKPHOS 85       Discussed care with patient             Problem List  Principal Problem:    E. coli UTI  Active Problems:    Hyperglycemia due to diabetes mellitus (HCC)    Overweight (BMI 25.0-29. 9)    History of stroke    Positive blood cultures    ANABEL (acute kidney injury) ([facility] [address]    Acute cystitis without hematuria    Type 2 diabetes mellitus with other specified complication ([facility] [address]    Essential hypertension    Hyperlipidemia  Resolved Problems:    * No resolved hospital problems.  *       Assessment & Plan:   Dizziness  -Patient still with persistent dizziness  -MRI to rule out stroke meclizine as needed doses previous history of CVA and also dizziness.  -    UTI  -Continue IV antibiotic  -Plan is to switch to oral antibiotics    Bacteremia  -Blood culture growing gram-positive cocci  -If repeat blood culture is negative plan for discharge    Acute kidney injury improving fluid hold      Diet: ADULT DIET;  Regular; 5 carb choices (75 gm/meal)  Code:Full Code  DVT PPX lovenox   - ulcers negative today plan for discharge later today if not tomorrow if repeat blood      Jayleen Roland MD   11/5/2022 8:35 AM

## 2022-11-05 NOTE — PROGRESS NOTES
Office : [phone]     Fax :[phone]       Nephrology progress  Note      Patient's Name: Arabella Dykes Saturday  2:05 PM  11/5/2022    Reason for Consult: ANABEL      Requesting Physician:  STACEY Moran CNP      Chief Complaint:    Chief Complaint   Patient presents with    Hyperglycemia     Pt reports blood sugar in 300. Pt does not take insulin but does take metformin. Pt is from home. Pt denies n/v. Pt reports neuropathy. History of Present Ilness:    Arabella Dykes Saturday is a 65 y.o. female with prior history of HTN, DM 2, CVA who was admitted for hyperglycemia and ANABEL. She presented with complaints of elevated blood sugar. Patient said she did not take any insulin and metformin due to not feeling well. Patient said this morning she checked her glucose it was around 200 so this afternoon rechecked again it was up to 300. Patient also complaining of dry mouth and feeling excessive thirst.  Patient was recently seen mental health for dizziness which had negative CT scan. With no acute finding. Patient says still having intermittent dizziness related to head movement. Patient denies any fevers chills no nausea vomiting does have some dysuria. Nothing that makes it better or worse. Initial blood work shows elevated creat of 1.6 . Baseline is 0.8       Interval hx :    Renal function better   BP better     No SOB         I/O last 3 completed shifts:  In: -   Out: 6716 [Urine:3150]    Past Medical History:   Diagnosis Date    Diabetes mellitus ([address]     Hypertension     Hypertension     Stroke (cerebrum) ([facility] [address] 2001       Past Surgical History:   Procedure Laterality Date    CHOLECYSTECTOMY         History reviewed. No pertinent family history.      reports that she has never smoked. She has never used smokeless tobacco. She reports that she does not drink alcohol and does not use drugs. Allergies:  Patient has no known allergies.     Current Medications:    lisinopril (PRINIVIL;ZESTRIL) tablet 10 mg, Daily  meclizine (ANTIVERT) tablet 12.5 mg, TID PRN  enoxaparin (LOVENOX) injection 40 mg, Daily  amLODIPine (NORVASC) tablet 10 mg, Daily  atorvastatin (LIPITOR) tablet 40 mg, Nightly  clopidogrel (PLAVIX) tablet 75 mg, Daily  gabapentin (NEURONTIN) capsule 300 mg, BID  insulin glargine (LANTUS) injection vial 20 Units, Nightly  cefTRIAXone (ROCEPHIN) 1,000 mg in dextrose 5 % 50 mL IVPB mini-bag, Q24H  dextrose bolus 10% 125 mL, PRN   Or  dextrose bolus 10% 250 mL, PRN  glucagon (rDNA) injection 1 mg, PRN  dextrose 10 % infusion, Continuous PRN  sodium chloride flush 0.9 % injection 5-40 mL, 2 times per day  sodium chloride flush 0.9 % injection 5-40 mL, PRN  0.9 % sodium chloride infusion, PRN  ondansetron (ZOFRAN-ODT) disintegrating tablet 4 mg, Q8H PRN   Or  ondansetron (ZOFRAN) injection 4 mg, Q6H PRN  polyethylene glycol (GLYCOLAX) packet 17 g, Daily PRN  acetaminophen (TYLENOL) tablet 650 mg, Q6H PRN   Or  acetaminophen (TYLENOL) suppository 650 mg, Q6H PRN  insulin lispro (HUMALOG) injection vial 0-8 Units, TID WC  insulin lispro (HUMALOG) injection vial 0-4 Units, Nightly        Physical exam:     Vitals:  BP (!) 155/91   Pulse 80   Temp 98.3 °F (36.8 °C) (Oral)   Resp 16   Ht 5' 5\" (1.651 m)   Wt 170 lb 12.8 oz (77.5 kg)   SpO2 96%   BMI 28.42 kg/m²   Constitutional:  OAA X3 NAD  Skin: no rash, turgor wnl  Heent:  eomi, mmm  Neck: no bruits or jvd noted  Cardiovascular:  S1, S2 without m/r/g  Respiratory: CTA B without w/r/r  Abdomen:  +bs, soft, nt, nd  Ext: no  lower extremity edema  Psychiatric: mood and affect appropriate  Musculoskeletal:  Rom, muscular strength intact    Labs:  CBC:   Recent Labs     11/03/22  0423 11/04/22  0439 11/05/22  0509   WBC 6.8 5.7 5.4   HGB 9.6* 10.3* 10.7*    355 366     BMP:    Recent Labs     11/03/22 0423 11/04/22  0433 11/05/22  0509    140 143   K 3.6 3.6 3.6    106 105   CO2 26 27 28   BUN 30* 17 14   CREATININE 0.8 0.6 0.7   GLUCOSE 121* 102* 81     Ca/Mg/Phos:   Recent Labs     11/03/22 0423 11/04/22 0433 11/05/22  0509   CALCIUM 9.1 9.3 9.3     Hepatic:   No results for input(s): AST, ALT, ALB, BILITOT, ALKPHOS in the last 72 hours. Troponin: No results for input(s): TROPONINI in the last 72 hours. BNP: No results for input(s): BNP in the last 72 hours. Lipids: No results for input(s): CHOL, TRIG, HDL, LDLCALC, LABVLDL in the last 72 hours. ABGs: No results for input(s): PHART, PO2ART, RWT3GUG in the last 72 hours. INR: No results for input(s): INR in the last 72 hours. UA:  Recent Labs     11/02/22  1418   COLORU DARK YELLOW*   CLARITYU CLOUDY*   GLUCOSEU Negative   BILIRUBINUR SMALL*   KETUA TRACE*   SPECGRAV 1.025   BLOODU SMALL*   PHUR 5.0   PROTEINU 100*   UROBILINOGEN 1.0   NITRU Negative   LEUKOCYTESUR LARGE*   LABMICR YES   URINETYPE NotGiven      Urine Microscopic:   Recent Labs     11/02/22  1418   BACTERIA None Seen   HYALCAST 54*   WBCUA 162*   RBCUA 2   EPIU 9*     Urine Culture:   Recent Labs     11/02/22  1418   LABURIN >100,000 CFU/ml     Urine Chemistry: No results for input(s): CLUR, LABCREA, PROTEINUR, NAUR in the last 72 hours. IMAGING:  MRI BRAIN WO CONTRAST   Final Result   1. No acute intracranial abnormality. No acute infarct. 2. Mild global parenchymal volume loss with moderate chronic microvascular   ischemic changes. 3. Small chronic infarct involving the left posterior frontal/parietal lobe. 4. Chronic lacunar infarcts involving the right thalamus and right lexi. Assessment/Plan :      1.  ANABEL   Likely pre renal   Hold lasix   Restart jardiance       Recommend to dose adjust all medications  based on renal functions  Maintain SBP> 90 mmHg   Daily weights   AVOID NSAIDs  Avoid Nephrotoxins  Monitor Intake/Output  Call if significant decrease in urine output      2. UTI. Abx. 3. HTN. cont lisinopril. Low sodium diet.            Thank you for allowing us to participate in care of Harini Engel Saturday         Electronically signed by: Trevor Cordero MD, 11/5/2022, 2:05 PM      [facility] of [address] [place] [address]  Office : [phone]  Fax :[phone]

## 2022-11-05 NOTE — PROGRESS NOTES
Infectious Diseases   Progress Note      Admission Date: 11/2/2022  Hospital Day: Hospital Day: 4   Attending: Josephine Davison MD  Date of service: 11/5/2022     Chief complaint/ Reason for consult:       Dizziness episodes  E. coli urinary tract infection  Positive blood cultures for coagulase-negative spinal coccus, likely contaminant from the skin  Chronic cerebrovascular ischemic disease with history of strokes  Type 2 diabetes mellitus    Microbiology:        I have reviewed allavailable micro lab data and cultures    Blood culture (2/2) - collected on 11/2/2022: Coagulase-negative Staphylococcus    Urine culture  - collected on 11/2/2022: Greater than 100, 000 CFU per mL of E. coli    Susceptibility    Escherichia coli (1)    Antibiotic Interpretation Microscan  Method Status    ampicillin Sensitive <=2 mcg/mL BACTERIAL SUSCEPTIBILITY PANEL BY DAKSHA     ampicillin-sulbactam Sensitive <=2 mcg/mL BACTERIAL SUSCEPTIBILITY PANEL BY DAKSHA     ceFAZolin Sensitive <=4 mcg/mL BACTERIAL SUSCEPTIBILITY PANEL BY DAKSHA      NOTE: Cefazolin should only be used for uncomplicated UTI         for E.coli or Klebsiella pneumoniae.         cefepime Sensitive <=0.12 mcg/mL BACTERIAL SUSCEPTIBILITY PANEL BY DAKSHA     cefTRIAXone Sensitive <=0.25 mcg/mL BACTERIAL SUSCEPTIBILITY PANEL BY DAKSHA     ciprofloxacin Resistant >=4 mcg/mL BACTERIAL SUSCEPTIBILITY PANEL BY DAKSHA     ertapenem Sensitive <=0.12 mcg/mL BACTERIAL SUSCEPTIBILITY PANEL BY DAKSHA     gentamicin Sensitive <=1 mcg/mL BACTERIAL SUSCEPTIBILITY PANEL BY DAKSHA     levofloxacin Resistant >=8 mcg/mL BACTERIAL SUSCEPTIBILITY PANEL BY DAKSHA     nitrofurantoin Sensitive <=16 mcg/mL BACTERIAL SUSCEPTIBILITY PANEL BY DAKSHA     piperacillin-tazobactam Sensitive <=4 mcg/mL BACTERIAL SUSCEPTIBILITY PANEL BY DAKSHA     trimethoprim-sulfamethoxazole Sensitive <=20 mcg/mL BACTERIAL SUSCEPTIBILITY PANEL BY DAKSHA        Narrative  Performed by: Jason Luis [facility]  ORDER#: [id number] ORDERED BY: DERECK OMALLEY   SOURCE: Urine Clean Catch                  COLLECTED:  11/02/22 14:18   ANTIBIOTICS AT NATHANIEL.:                      RECEIVED :  11/02/22 21:06     Antibiotics and immunizations:       Current antibiotics: All antibiotics and their doses were reviewed by me    Recent Abx Admin                     cefTRIAXone (ROCEPHIN) 1,000 mg in dextrose 5 % 50 mL IVPB mini-bag (mg) 1,000 mg New Bag 11/04/22 1716                      Immunization History: All immunization history was reviewed by me today. There is no immunization history on file for this patient. Known drug allergies: All allergies were reviewed and updated    No Known Allergies    Social history:     Social History:  All social andepidemiologic history was reviewed and updated by me today as needed. Tobacco use:   reports that she has never smoked. She has never used smokeless tobacco.  Alcohol use:   reports no history of alcohol use. Currently lives in: [address] [place] [id number]   reports no history of drug use. COVID VACCINATION AND LAB RESULT RECORDS:     Internal Administration   First Dose      Second Dose           Last COVID Lab No results found for: SARS-COV-2, SARS-COV-2 RNA, SARS-COV-2, SARS-COV-2, SARS-COV-2 BY PCR, SARS-COV-2, SARS-COV-2, SARS-COV-2         Assessment:     The patient is a 65 y.o. old female who  has a past medical history of Diabetes mellitus ([facility] [address] [facility] Hypertension, Hypertension, and Stroke (cerebrum) ([facility] [address] (2001). with following problems:    Dizziness episodes -improved  E. coli urinary tract infection -E. coli sensitivities reviewed-this is well covered with ceftriaxone  Positive blood cultures for coagulase-negative Staphylococcus likely contaminant from the skin-cannot rule out   Chronic cerebrovascular ischemic disease with history of strokes  Type 2 diabetes mellitus-counseling done  Essential hypertension  Hyperlipidemia  Overweight due to excess calorie intake :  Body mass index is 29.24 kg/m²-counseling done      Discussion:      The patient is afebrile. She remains on IV ceftriaxone. I had ordered repeat blood cultures yesterday, which are in process. Serum creatinine 0.7 today. White cell count is 5400 with hemoglobin of 10.7 and platelet count of 471,808. The patient had a 2D echo done yesterday which did not show any obvious endocarditis    Plan:     Diagnostic Workup:    Follow-up on repeat blood cultures from yesterday  Continue to follow  fever curve, WBC count and blood cultures. Continue to monitor blood counts, liver and renal function. Antimicrobials: Will continue IV ceftriaxone 1 g every 24 hour  Continue to monitor her vitals closely  If the patient continues to make progress and blood cultures from yesterday remain negative till tomorrow, can plan for discharge tomorrow on a 1 week course of oral Ceftin  Recommend Probiotic twice daily while on antibiotic  Continue close vitals monitoring. Maintain good glycemic control. Fall precautions. Aspiration precautions. Continue to watch for new fever or diarrhea. DVT prophylaxis. Discussed all above with patient and RN. Drug Monitoring:    Continue monitoring for antibiotic toxicity as follows: CBC, CMP   Continue to watch for following: new or worsening fever, new hypotension, hives, lip swelling and redness or purulence at vascular access sites. I/v access Management:    Continue to monitor i.v access sites for erythema, induration, discharge or tenderness. As always, continue efforts to minimize tubes/lines/drains as clinically appropriate to reduce chances of line associated infections. Patient education and counseling: The patient was educated in detail about the side-effects of various antibiotics and things to watch for like new rashes, lip swelling, severe reaction, worsening diarrhea, break through fever etc.  Discussed patient's condition and what to expect.  All of the patient's questions were addressed in a satisfactory manner and patient verbalized understanding all instructions. Diabetes mellitus education and counseling:    Patient was educated in detail about the importance of keeping diabetes under control to improve the cure rate of infection and prevent future infections. Patient was advised to check blood glucose level regularly and to stay compliant with the diabetes medications. Patient was advised to the trim the toe nails carefully, wear shoes or slippers at all times and check both feet everyday before going to bed to look for any cuts, blisters, swelling or redness. Importance of washing the feet everyday with soap and water and keeping them dry, and seeking prompt medical attention in case of a non-healing wound or ulcer on the feet was also highlighted. Weight loss counseling:    Extensive weight loss counseling was done. It is important to set a realistic weight loss goal. First goal should be to avoid gaining more weight and staying at current weight (or within 5 percent). People at high risk of developing diabetes who are able to lose 5 percent of their body weight and maintain this weight will reduce their risk of developing diabetes by about 50 percent and reduce their blood pressure. Losing more than 15 percent of  body weight and staying at this weight is an extremely good result, even if you never reach your \"dream\" or \"ideal\" weight. Lifestyle changes including changing eating habits, substituting excess carbohydrates with proteins, stress reduction, using self-help programs like Weight Watchers®, Overeaters Anonymous®, and Take Off Pounds Sensibly (TOPS)© , following DASH diet and increasing exercise or walking briskly daily for half hour to and hour 5-7 days a week was suggested among other measures.  Information was given about various weight loss education programs and their websites like www.cdc.gov/healthyweight, www.choosemyplate.gov and www.health.gov/dietaryguidelines/       TIME SPENT TODAY:     - Spent over  36 minutes on visit (including interval history, physical exam, review of data including labs, cultures, imaging, development and implementation of treatment plan and coordination of complex care). More than 50 percent of this includes face-to-face time spent with the patient for counseling and coordination of care. Thank you for involving me in the care of your patient. I will continue to follow. If you have anyadditional questions, please do not hesitate to contact me. Subjective: Interval history: Interval history was obtained from chart review and patient/ RN. She is afebrile. She is tolerating antibiotic okay. No diarrhea     REVIEW OF SYSTEMS:      Review of Systems   Constitutional:  Negative for chills, diaphoresis and fever. HENT:  Negative for ear discharge, ear pain, postnasal drip, rhinorrhea, sinus pressure, sinus pain and sore throat. Eyes:  Negative for discharge and redness. Respiratory:  Negative for cough, shortness of breath and wheezing. Cardiovascular:  Negative for chest pain and leg swelling. Gastrointestinal:  Negative for abdominal pain, constipation, diarrhea and nausea. Endocrine: Negative for cold intolerance, heat intolerance and polydipsia. Genitourinary:  Negative for dysuria, flank pain, frequency, hematuria and urgency. Musculoskeletal:  Negative for back pain and myalgias. Skin:  Negative for rash. Allergic/Immunologic: Negative for immunocompromised state. Neurological:  Negative for dizziness, seizures and headaches. Hematological:  Does not bruise/bleed easily. Psychiatric/Behavioral:  Negative for agitation, hallucinations and suicidal ideas. The patient is not nervous/anxious. All other systems reviewed and are negative. Past Medical History: All past medical history reviewed today.     Past Medical History:   Diagnosis Date    Diabetes mellitus ([facility] [address] Hypertension     Hypertension     Stroke (cerebrum) ([facility] [address] 2001       Past Surgical History: All past surgical history was reviewed today. Past Surgical History:   Procedure Laterality Date    CHOLECYSTECTOMY         Family History: All family history was reviewed today. History reviewed. No pertinent family history. Objective:       PHYSICAL EXAM:      Vitals:   Vitals:    11/05/22 0235 11/05/22 0411 11/05/22 0623 11/05/22 0733   BP:  (!) 154/80  (!) 155/91   Pulse: 65 75 78 80   Resp:  16  16   Temp:  98.3 °F (36.8 °C)  98.3 °F (36.8 °C)   TempSrc:  Oral  Oral   SpO2:    96%   Weight:       Height:           Physical Exam  Vitals and nursing note reviewed. Constitutional:       Appearance: She is well-developed. She is not diaphoretic. Comments: The patient was seen earlier today. HENT:      Head: Normocephalic and atraumatic. Right Ear: External ear normal. There is no impacted cerumen. Left Ear: External ear normal. There is no impacted cerumen. Nose: Nose normal.      Mouth/Throat:      Mouth: Mucous membranes are moist.      Pharynx: Oropharynx is clear. No oropharyngeal exudate. Eyes:      General: No scleral icterus. Right eye: No discharge. Left eye: No discharge. Conjunctiva/sclera: Conjunctivae normal.      Pupils: Pupils are equal, round, and reactive to light. Neck:      Thyroid: No thyromegaly. Cardiovascular:      Rate and Rhythm: Normal rate and regular rhythm. Heart sounds: Normal heart sounds. No murmur heard. No friction rub. Pulmonary:      Effort: No respiratory distress. Breath sounds: No stridor. No wheezing or rales. Abdominal:      General: Bowel sounds are normal.      Palpations: Abdomen is soft. Tenderness: There is no abdominal tenderness. There is no guarding or rebound. Musculoskeletal:         General: No swelling, tenderness or deformity. Normal range of motion.       Cervical back: Normal range of motion and neck supple. Right lower leg: No edema. Left lower leg: No edema. Lymphadenopathy:      Cervical: No cervical adenopathy. Skin:     General: Skin is warm and dry. Coloration: Skin is not jaundiced. Findings: No bruising, erythema or rash. Neurological:      General: No focal deficit present. Mental Status: She is alert and oriented to person, place, and time. Mental status is at baseline. Motor: No abnormal muscle tone. Psychiatric:         Mood and Affect: Mood normal.         Behavior: Behavior normal.     *    Lines and drains: All vascular access sites are healthy with no local erythema, discharge or tenderness. Intake and output:    I/O last 3 completed shifts:  In: -   Out: 3150 [Urine:3150]    Lab Data:   All available labs and old records have been reviewed by me. CBC:  Recent Labs     11/03/22 0423 11/04/22 0433 11/05/22  0509   WBC 6.8 5.7 5.4   RBC 3.67* 4.05 4.21   HGB 9.6* 10.3* 10.7*   HCT 29.1* 32.7* 33.0*    355 366   MCV 79.3* 80.7 78.4*   MCH 26.0 25.5* 25.4*   MCHC 32.8 31.6 32.4   RDW 13.5 13.7 13.4        BMP:  Recent Labs     11/03/22 0423 11/04/22 0433 11/05/22  0509    140 143   K 3.6 3.6 3.6    106 105   CO2 26 27 28   BUN 30* 17 14   CREATININE 0.8 0.6 0.7   CALCIUM 9.1 9.3 9.3   GLUCOSE 121* 102* 81        Hepatic Function Panel:   Lab Results   Component Value Date/Time    ALKPHOS 85 11/02/2022 01:00 PM    ALT 10 11/02/2022 01:00 PM    AST 10 11/02/2022 01:00 PM    PROT 8.2 11/02/2022 01:00 PM    BILITOT 0.3 11/02/2022 01:00 PM    LABALBU 3.3 11/02/2022 01:00 PM       CPK:   Lab Results   Component Value Date    CKTOTAL 86 09/16/2021     ESR: No results found for: SEDRATE  CRP: No results found for: CRP        Imaging: All pertinent images and reports for the current visit were reviewed by me during this visit.   I reviewed the chest x-ray/CT scan/MRI images and independently interpreted the findings and results today. MRI BRAIN WO CONTRAST   Final Result   1. No acute intracranial abnormality. No acute infarct. 2. Mild global parenchymal volume loss with moderate chronic microvascular   ischemic changes. 3. Small chronic infarct involving the left posterior frontal/parietal lobe. 4. Chronic lacunar infarcts involving the right thalamus and right lexi. Medications: All current and past medications were reviewed. lisinopril  10 mg Oral Daily    enoxaparin  40 mg SubCUTAneous Daily    amLODIPine  10 mg Oral Daily    atorvastatin  40 mg Oral Nightly    clopidogrel  75 mg Oral Daily    gabapentin  300 mg Oral BID    insulin glargine  20 Units SubCUTAneous Nightly    cefTRIAXone (ROCEPHIN) IV  1,000 mg IntraVENous Q24H    sodium chloride flush  5-40 mL IntraVENous 2 times per day    insulin lispro  0-8 Units SubCUTAneous TID WC    insulin lispro  0-4 Units SubCUTAneous Nightly        dextrose      sodium chloride         meclizine, dextrose bolus **OR** dextrose bolus, glucagon (rDNA), dextrose, sodium chloride flush, sodium chloride, ondansetron **OR** ondansetron, polyethylene glycol, acetaminophen **OR** acetaminophen      Problem list:       Patient Active Problem List   Diagnosis Code    Neck mass R22.1    TIA (transient ischemic attack) G45.9    Type 2 diabetes mellitus with other specified complication (HCC) T76.26    Essential hypertension I10    Hyperlipidemia E78.5    Weakness R53.1    Dizziness R42    Hypokalemia E87.6    Hypomagnesemia E83.42    E. coli UTI N39.0, B96.20    Hyperglycemia due to diabetes mellitus (HCC) E11.65    Overweight (BMI 25.0-29. 9) E66.3    History of stroke Z86.73    Positive blood cultures R78.81    ANABEL (acute kidney injury) ([facility] [address] N17.9    Acute cystitis without hematuria N30.00    Weight loss counseling, encounter for Z71.3    Diabetes education, encounter for Z71.89       Please note that this chart was generated using Dragon dictation software.  Although every effort was made to ensure the accuracy of this automated transcription, some errors in transcription may have occurred inadvertently. If you may need any clarification, please do not hesitate to contact me through EPIC or at the phone number provided below with my electronic signature. Any pictures or media included in this note were obtained after taking informed verbal consent from the patient and with their approval to include those in the patient's medical record. Theresa Lizama MD, MPH, [facility]  11/5/2022, 2:03 PM  [facility] Infectious Disease   [address] [place] [address]  Office: [phone]  Fax: [phone]  In-person Clinic days:  Tuesday & Thursday a.m. Virtual clinic days: Monday, Wednesday & Friday a.m.

## 2022-11-06 VITALS
WEIGHT: 168.9 LBS | OXYGEN SATURATION: 98 % | BODY MASS INDEX: 28.14 KG/M2 | DIASTOLIC BLOOD PRESSURE: 76 MMHG | TEMPERATURE: 98.5 F | RESPIRATION RATE: 16 BRPM | SYSTOLIC BLOOD PRESSURE: 143 MMHG | HEART RATE: 85 BPM | HEIGHT: 65 IN

## 2022-11-06 LAB
GLUCOSE BLD-MCNC: 156 MG/DL (ref 70–99)
GLUCOSE BLD-MCNC: 71 MG/DL (ref 70–99)
PERFORMED ON: ABNORMAL
PERFORMED ON: NORMAL
SARS-COV-2, NAAT: NOT DETECTED

## 2022-11-06 PROCEDURE — 6370000000 HC RX 637 (ALT 250 FOR IP): Performed by: INTERNAL MEDICINE

## 2022-11-06 PROCEDURE — 87635 SARS-COV-2 COVID-19 AMP PRB: CPT

## 2022-11-06 PROCEDURE — 6370000000 HC RX 637 (ALT 250 FOR IP): Performed by: HOSPITALIST

## 2022-11-06 PROCEDURE — 2580000003 HC RX 258: Performed by: HOSPITALIST

## 2022-11-06 PROCEDURE — 99233 SBSQ HOSP IP/OBS HIGH 50: CPT | Performed by: INTERNAL MEDICINE

## 2022-11-06 PROCEDURE — 6360000002 HC RX W HCPCS: Performed by: HOSPITALIST

## 2022-11-06 RX ORDER — MECLIZINE HCL 12.5 MG/1
12.5 TABLET ORAL 3 TIMES DAILY PRN
Qty: 30 TABLET | Refills: 0 | Status: SHIPPED | OUTPATIENT
Start: 2022-11-06 | End: 2022-11-16

## 2022-11-06 RX ORDER — CEFUROXIME AXETIL 250 MG/1
250 TABLET ORAL 2 TIMES DAILY
Qty: 14 TABLET | Refills: 0 | Status: SHIPPED | OUTPATIENT
Start: 2022-11-06 | End: 2022-11-13

## 2022-11-06 RX ADMIN — LISINOPRIL 10 MG: 10 TABLET ORAL at 07:57

## 2022-11-06 RX ADMIN — CLOPIDOGREL BISULFATE 75 MG: 75 TABLET ORAL at 07:56

## 2022-11-06 RX ADMIN — GABAPENTIN 300 MG: 300 CAPSULE ORAL at 07:56

## 2022-11-06 RX ADMIN — Medication 10 ML: at 07:59

## 2022-11-06 RX ADMIN — AMLODIPINE BESYLATE 10 MG: 5 TABLET ORAL at 07:56

## 2022-11-06 RX ADMIN — ENOXAPARIN SODIUM 40 MG: 100 INJECTION SUBCUTANEOUS at 07:57

## 2022-11-06 ASSESSMENT — PAIN SCALES - GENERAL
PAINLEVEL_OUTOF10: 0

## 2022-11-06 NOTE — CARE COORDINATION
Discharge Planning  SNF referrals sent to [facility] and [facility]   as per patient's choices. [facility]-  No beds available  [facility];  - no beds available, they are discharge dependent  [facility];  - can accept patient when medically stable.

## 2022-11-06 NOTE — CARE COORDINATION
Discharge Planning  Discharge order noted. GABBI spoke with Daniela at [facility] admission who stated able to accept patient today. Will need a Rapid covid -19 test prior to discharge. Transport scheduled with pickup time of 1400 via [facility] [employer] . Patient and her granddaughter Saleem Fernandez  were informed and in agreement. Daniela at the facility was also updated of the  time.   Nurse to Nurse report;  [phone]  Fax;  [phone]

## 2022-11-06 NOTE — PROGRESS NOTES
Data- discharge order received, pt or  (appointed legal authority) verbalized agreement to discharge, disposition to [facility] [address] [address] , [address] [place] [address] reviewed and signed by physician. Action- AVS prepared, BRANDEN completed/ reported faxed by case management/. Discharge instruction summary: Diet- 5 carb choices, Activity- dependent, immunizations reviewed and complete, medications prescriptions to be filled at receiving facility except for the controlled prescriptions to be sent: , Transfer code status: Full Code, Response- Bedside RN to call report to receiving facility. Pt belongings gathered, peripheral IV and cardiac monitoring removed. Disposition to Discharged via cart/stretcher to skilled nursing by EMS transportation, no complications reported. 1. WEIGHT: Admit Weight: 175 lb 11.2 oz (79.7 kg) (11/02/22 1230)        Today  Weight: 168 lb 14.4 oz (76.6 kg) (11/06/22 0022)       2.  O2 SAT.: SpO2: 98 % (11/06/22 1145)  Pt transport

## 2022-11-06 NOTE — PROGRESS NOTES
3 Encounters:   11/06/22 168 lb 14.4 oz (76.6 kg)   08/20/22 173 lb 8 oz (78.7 kg)   04/05/22 156 lb (70.8 kg)       General appearance:  Appears comfortable  Eyes: Sclera clear. Pupils equal.  ENT: Moist oral mucosa. Trachea midline, no adenopathy. Cardiovascular: Regular rhythm, normal S1, S2. No murmur. No edema in lower extremities  Respiratory: Not using accessory muscles. Good inspiratory effort. Clear to auscultation bilaterally, no wheeze or crackles. GI: Abdomen soft, no tenderness, not distended, normal bowel sounds  Musculoskeletal: No cyanosis in digits, neck supple  Neurology: CN 2-12 grossly intact. No speech or motor deficits  Psych: Normal affect. Alert and oriented in time, place and person  Skin: Warm, dry, normal turgor    Labs and Tests:  CBC:   Recent Labs     11/04/22 0433 11/05/22  0509   WBC 5.7 5.4   HGB 10.3* 10.7*    366     BMP:    Recent Labs     11/04/22 0433 11/05/22  0509    143   K 3.6 3.6    105   CO2 27 28   BUN 17 14   CREATININE 0.6 0.7   GLUCOSE 102* 81     Hepatic:   No results for input(s): AST, ALT, ALB, BILITOT, ALKPHOS in the last 72 hours. Discussed care with patient             Problem List  Principal Problem:    E. coli UTI  Active Problems:    Hyperglycemia due to diabetes mellitus (HCC)    Overweight (BMI 25.0-29. 9)    History of stroke    Positive blood cultures    ANABEL (acute kidney injury) ([facility]    Acute cystitis without hematuria    Weight loss counseling, encounter for    Diabetes education, encounter for    Type 2 diabetes mellitus with other specified complication ([facility] [facility]    Essential hypertension    Hyperlipidemia  Resolved Problems:    * No resolved hospital problems.  *       Assessment & Plan:   Dizziness  -Patient still with persistent dizziness  -Negative stroke  -    UTI  -Continue IV antibiotic  -Plan is to switch to oral antibiotics    Bacteremia  -Blood culture growing gram-positive cocci  -Repeat blood culture negative plan to continue Ceftin for 10-day  Acute kidney injury improving fluid hold      Diet: ADULT DIET;  Regular; 5 carb choices (75 gm/meal)  Code:Full Code  DVT PPX lovenox   Disposition to skilled nursing facility when arrangements made      Shira Dobbins MD   11/6/2022 8:28 AM

## 2022-11-06 NOTE — CARE COORDINATION
Discharge Plan:   Patient discharged to:  [facility]  [address]  [place], [place]  Report: [phone]  Fax: [phone]   SW/DC Planner faxed, BRANDEN and AVS   Narcotic Prescriptions faxed were:  No  RN: Radha Johnson  will call report        Medical Transport with: [address] [facility]  ([phone])    time:  1400  Family advised of discharge?: -Yes , Granddaughter- [facility]?:  HENS-  ID  [id number]    All discharge needs met per case management.

## 2022-11-06 NOTE — PROGRESS NOTES
Report called at [facility], updated pt's POC and  time. All questions were answered.

## 2022-11-06 NOTE — DISCHARGE INSTR - COC
Continuity of Care Form    Patient Name: Ginger Guerrero Saturday   :  1945  MRN:  [id number]    Admit date:  2022  Discharge date:      Code Status Order: Full Code   Advance Directives:     Admitting Physician:  Piper Leslie MD  PCP: STACEY Cottrell CNP    Discharging Nurse:   [address] Unit/Room#: [id number]-7004/9091-74  Discharging Unit Phone Number: [phone]    Emergency Contact:   Extended Emergency Contact Information  Primary Emergency Contact: [facility]  Home Phone: [phone]  Relation: Brother/Sister  Secondary Emergency Contact: [address] Phone: [phone]  Relation: Brother/Sister    Past Surgical History:  Past Surgical History:   Procedure Laterality Date    CHOLECYSTECTOMY         Immunization History: There is no immunization history on file for this patient. Active Problems:  Patient Active Problem List   Diagnosis Code    Neck mass R22.1    TIA (transient ischemic attack) G45.9    Type 2 diabetes mellitus with other specified complication (HCC) H22.57    Essential hypertension I10    Hyperlipidemia E78.5    Weakness R53.1    Dizziness R42    Hypokalemia E87.6    Hypomagnesemia E83.42    E. coli UTI N39.0, B96.20    Hyperglycemia due to diabetes mellitus (HCC) E11.65    Overweight (BMI 25.0-29. 9) E66.3    History of stroke Z86.73    Positive blood cultures R78.81    ANABEL (acute kidney injury) ([facility] N17.9    Acute cystitis without hematuria N30.00    Weight loss counseling, encounter for Z71.3    Diabetes education, encounter for Z71.89       Isolation/Infection:   Isolation            No Isolation          Patient Infection Status       None to display            Nurse Assessment:  Last Vital Signs: BP (!) 167/87   Pulse 88   Temp 98.5 °F (36.9 °C) (Oral)   Resp 16   Ht 5' 5\" (1.651 m)   Wt 168 lb 14.4 oz (76.6 kg)   SpO2 97%   BMI 28.11 kg/m²     Last documented pain score (0-10 scale): Pain Level: 0  Last Weight:   Wt Readings from Last 1 Encounters:   11/06/22 168 lb 14.4 oz (76.6 kg)     Mental Status:  oriented    IV Access:  - None    Nursing Mobility/ADLs:  Walking   Dependent  Transfer  Dependent  Bathing  Dependent  Dressing  Dependent  Toileting  Dependent  Feeding  Independent  Med Admin  Assisted  Med Delivery   whole    Wound Care Documentation and Therapy:        Elimination:  Continence: Bowel: No  Bladder: No  Urinary Catheter:   Colostomy/Ileostomy/Ileal onduit: No       Date of Last BM: 11/06/22    Intake/Output Summary (Last 24 hours) at 11/6/2022 0730  Last data filed at 11/6/2022 0549  Gross per 24 hour   Intake --   Output 1850 ml   Net -1850 ml     I/O last 3 completed shifts:  In: -   Out: 2350 [Urine:2350]    Safety Concerns:     Fall     Impairments/Disabilities:      None    Nutrition Therapy:  Current Nutrition Therapy:   - Oral Diet:  General and Carb Control 4 carbs/meal (1800kcals/day)    Routes of Feeding: Oral  Liquids: No Restrictions  Daily Fluid Restriction: no  Last Modified Barium Swallow with Video (Video Swallowing Test): not done    Treatments at the Time of Hospital Discharge:   Respiratory Treatments: n/a  Oxygen Therapy:  is not on home oxygen therapy. Ventilator:    - No ventilator support    Rehab Therapies: Physical Therapy and Occupational Therapy  Weight Bearing Status/Restrictions: Non-weight bearing on left leg  Other Medical Equipment (for information only, NOT a DME order):     Other Treatments: see order    Patient's personal belongings (please select all that are sent with patient):      RN SIGNATURE:  Electronically signed by César Wadsworth RN on 11/6/22 at 10:32 AM EST    CASE MANAGEMENT/SOCIAL WORK SECTION    Inpatient Status Date:  11/2/22    Readmission Risk Assessment Score:  Readmission Risk              Risk of Unplanned Readmission:  16           Discharging to Facility/ Agency   Name:   [facility] [address] [place]  Report: [phone]  Fax: [phone]   Address:  Phone:  Fax:    Dialysis Facility (if applicable)   Name:  Address:  Dialysis Schedule:  Phone:  Fax:    / signature: Electronically signed by Elmo Ferrera RN on 11/6/22 at 8:49 AM EST    PHYSICIAN SECTION    Prognosis: Good    Condition at Discharge: Stable    Rehab Potential (if transferring to Rehab): Good    Recommended Labs or Other Treatments After Discharge:     Physician Certification: I certify the above information and transfer of Derian Gonzalez Saturday  is necessary for the continuing treatment of the diagnosis listed and that she requires Wes Rony for greater 30 days.      Update Admission H&P: No change in H&P    PHYSICIAN SIGNATURE:  Electronically signed by Sid Snowden MD on 11/6/22 at 7:31 AM EST

## 2022-11-06 NOTE — PROGRESS NOTES
Office : [phone]     Fax :[phone]       Nephrology progress  Note      Patient's Name: Marylouise Soulier Saturday  8:43 AM  11/6/2022    Reason for Consult: ANABEL      Requesting Physician:  STACEY Fry - RADHA      Chief Complaint:    Chief Complaint   Patient presents with    Hyperglycemia     Pt reports blood sugar in 300. Pt does not take insulin but does take metformin. Pt is from home. Pt denies n/v. Pt reports neuropathy. History of Present Ilness:    Marylouise Soulier Saturday is a 65 y.o. female with prior history of HTN, DM 2, CVA who was admitted for hyperglycemia and ANABEL. She presented with complaints of elevated blood sugar. Patient said she did not take any insulin and metformin due to not feeling well. Patient said this morning she checked her glucose it was around 200 so this afternoon rechecked again it was up to 300. Patient also complaining of dry mouth and feeling excessive thirst.  Patient was recently seen mental health for dizziness which had negative CT scan. With no acute finding. Patient says still having intermittent dizziness related to head movement. Patient denies any fevers chills no nausea vomiting does have some dysuria. Nothing that makes it better or worse. Initial blood work shows elevated creat of 1.6 . Baseline is 0.8       Interval hx :    Renal function better   BP better     No SOB         I/O last 3 completed shifts:  In: -   Out: 5459 [Urine:2350]    Past Medical History:   Diagnosis Date    Diabetes mellitus ([address]     Hypertension     Hypertension     Stroke (cerebrum) ([address] 2001       Past Surgical History:   Procedure Laterality Date    CHOLECYSTECTOMY         History reviewed. No pertinent family history.      reports that she has never smoked. She has never used smokeless tobacco. She reports that she does not drink alcohol and does not use drugs. Allergies:  Patient has no known allergies.     Current Medications:    lisinopril (PRINIVIL;ZESTRIL) tablet 10 mg, Daily  meclizine (ANTIVERT) tablet 12.5 mg, TID PRN  enoxaparin (LOVENOX) injection 40 mg, Daily  amLODIPine (NORVASC) tablet 10 mg, Daily  atorvastatin (LIPITOR) tablet 40 mg, Nightly  clopidogrel (PLAVIX) tablet 75 mg, Daily  gabapentin (NEURONTIN) capsule 300 mg, BID  insulin glargine (LANTUS) injection vial 20 Units, Nightly  dextrose bolus 10% 125 mL, PRN   Or  dextrose bolus 10% 250 mL, PRN  glucagon (rDNA) injection 1 mg, PRN  dextrose 10 % infusion, Continuous PRN  sodium chloride flush 0.9 % injection 5-40 mL, 2 times per day  sodium chloride flush 0.9 % injection 5-40 mL, PRN  0.9 % sodium chloride infusion, PRN  ondansetron (ZOFRAN-ODT) disintegrating tablet 4 mg, Q8H PRN   Or  ondansetron (ZOFRAN) injection 4 mg, Q6H PRN  polyethylene glycol (GLYCOLAX) packet 17 g, Daily PRN  acetaminophen (TYLENOL) tablet 650 mg, Q6H PRN   Or  acetaminophen (TYLENOL) suppository 650 mg, Q6H PRN  insulin lispro (HUMALOG) injection vial 0-8 Units, TID WC  insulin lispro (HUMALOG) injection vial 0-4 Units, Nightly        Physical exam:     Vitals:  BP (!) 164/72   Pulse 88   Temp 98.9 °F (37.2 °C) (Oral)   Resp 16   Ht 5' 5\" (1.651 m)   Wt 168 lb 14.4 oz (76.6 kg)   SpO2 97%   BMI 28.11 kg/m²   Constitutional:  OAA X3 NAD  Skin: no rash, turgor wnl  Heent:  eomi, mmm  Neck: no bruits or jvd noted  Cardiovascular:  S1, S2 without m/r/g  Respiratory: CTA B without w/r/r  Abdomen:  +bs, soft, nt, nd  Ext: no  lower extremity edema  Psychiatric: mood and affect appropriate  Musculoskeletal:  Rom, muscular strength intact    Labs:  CBC:   Recent Labs     11/04/22  0433 11/05/22  0509   WBC 5.7 5.4   HGB 10.3* 10.7*    366       BMP:    Recent Labs 11/04/22 0433 11/05/22  0509    143   K 3.6 3.6    105   CO2 27 28   BUN 17 14   CREATININE 0.6 0.7   GLUCOSE 102* 81       Ca/Mg/Phos:   Recent Labs     11/04/22 0433 11/05/22  0509   CALCIUM 9.3 9.3       Hepatic:   No results for input(s): AST, ALT, ALB, BILITOT, ALKPHOS in the last 72 hours. Troponin: No results for input(s): TROPONINI in the last 72 hours. BNP: No results for input(s): BNP in the last 72 hours. Lipids: No results for input(s): CHOL, TRIG, HDL, LDLCALC, LABVLDL in the last 72 hours. ABGs: No results for input(s): PHART, PO2ART, KEP7MRN in the last 72 hours. INR: No results for input(s): INR in the last 72 hours. UA:  No results for input(s): Ludmila Bran, GLUCOSEU, BILIRUBINUR, KETUA, SPECGRAV, BLOODU, PHUR, PROTEINU, UROBILINOGEN, NITRU, LEUKOCYTESUR, Karron Belts in the last 72 hours. Urine Microscopic:   No results for input(s): LABCAST, BACTERIA, COMU, HYALCAST, WBCUA, RBCUA, EPIU in the last 72 hours. Urine Culture:   No results for input(s): LABURIN in the last 72 hours. Urine Chemistry: No results for input(s): Sherial Blower, PROTEINUR, NAUR in the last 72 hours. IMAGING:  MRI BRAIN WO CONTRAST   Final Result   1. No acute intracranial abnormality. No acute infarct. 2. Mild global parenchymal volume loss with moderate chronic microvascular   ischemic changes. 3. Small chronic infarct involving the left posterior frontal/parietal lobe. 4. Chronic lacunar infarcts involving the right thalamus and right lexi. Assessment/Plan :      1. ANABEL   Likely pre renal   Hold lasix   Restarted jardiance       Recommend to dose adjust all medications  based on renal functions  Maintain SBP> 90 mmHg   Daily weights   AVOID NSAIDs  Avoid Nephrotoxins  Monitor Intake/Output  Call if significant decrease in urine output      2. UTI. Abx. 3. HTN. cont lisinopril. Low sodium diet.            Thank you for allowing us to participate in care of Arabella Dykes Saturday         Electronically signed by: Tracy Rodriguez MD, 11/6/2022, 8:43 AM      [facility] [address]  Office : [phone]  Fax :[phone]

## 2022-11-08 LAB
BLOOD CULTURE, ROUTINE: NORMAL
CULTURE, BLOOD 2: NORMAL

## 2022-11-15 NOTE — DISCHARGE SUMMARY
[facility] DISCHARGE SUMMARY    Patient Demographics    Patient. Naomie Saturday  Date of Birth. 1945  MRN. [id number]     Primary care provider. STACEY Shelby CNP  (Tel: [phone])    Admit date: 11/2/2022    Discharge date (blank if same as Note Date): 11/6/2022  Note Date: 11/15/2022     Reason for Hospitalization. Chief Complaint   Patient presents with    Hyperglycemia     Pt reports blood sugar in 300. Pt does not take insulin but does take metformin. Pt is from home. Pt denies n/v. Pt reports neuropathy. [facility]. Bacteremia with UTI  -Patient presented with dizziness. Likely secondary to above acute stroke was ruled out. Evaluated by infectious disease blood culture growing gram-positive cocci likely initially contaminant but given UTI patient will be treated with 10 days of Ceftin. Acute kidney injury    UTI treated    Bacteremia repeat blood culture    Patient discharged to skilled nurse    Consults. IP CONSULT TO SPIRITUAL SERVICES  IP CONSULT TO NEPHROLOGY  IP CONSULT TO INFECTIOUS DISEASES    Physical examination on discharge day. BP (!) 143/76   Pulse 85   Temp 98.5 °F (36.9 °C) (Oral)   Resp 16   Ht 5' 5\" (1.651 m)   Wt 168 lb 14.4 oz (76.6 kg)   SpO2 98%   BMI 28.11 kg/m²   General appearance. Alert. Looks comfortable. HEENT. Sclera clear. Moist mucus membranes. Cardiovascular. Regular rate and rhythm, normal S1, S2. No murmur. Respiratory. Not using accessory muscles. Clear to auscultation bilaterally, no wheeze. Gastrointestinal. Abdomen soft, non-tender, not distended, normal bowel sounds  Neurology. Facial symmetry. No speech deficits. Moving all extremities equally. Extremities. No edema in lower extremities. Skin.  Warm, dry, normal turgor    Condition at time of discharge stable     Medication instructions provided to patient at discharge. Medication List        START taking these medications      meclizine 12.5 MG tablet  Commonly known as: ANTIVERT  Take 1 tablet by mouth 3 times daily as needed for Dizziness            CHANGE how you take these medications      insulin glargine 100 UNIT/ML injection pen  Commonly known as: LANTUS;BASAGLAR  Inject 12 Units into the skin nightly  What changed: how much to take            CONTINUE taking these medications      amLODIPine 10 MG tablet  Commonly known as: NORVASC     atorvastatin 40 MG tablet  Commonly known as: LIPITOR  Take 1 tablet by mouth nightly     DULoxetine 20 MG extended release capsule  Commonly known as: CYMBALTA     gabapentin 300 MG capsule  Commonly known as: NEURONTIN     lisinopril 10 MG tablet  Commonly known as: PRINIVIL;ZESTRIL  Take 1 tablet by mouth daily     metFORMIN 500 MG tablet  Commonly known as: GLUCOPHAGE            STOP taking these medications      busPIRone 7.5 MG tablet  Commonly known as: BUSPAR     empagliflozin 10 MG tablet  Commonly known as: JARDIANCE     furosemide 20 MG tablet  Commonly known as: Lasix     ondansetron 4 MG disintegrating tablet  Commonly known as: Zofran ODT     potassium chloride 10 MEQ extended release tablet  Commonly known as: KLOR-CON M            ASK your doctor about these medications      cefUROXime 250 MG tablet  Commonly known as: CEFTIN  Take 1 tablet by mouth 2 times daily for 7 days  Ask about: Should I take this medication?      clopidogrel 75 MG tablet  Commonly known as: PLAVIX  Take 1 tablet by mouth daily               Where to Get Your Medications        These medications were sent to [address] [place] [address], [address] [place] [address]      Phone: [phone]   cefUROXime 250 MG tablet  insulin glargine 100 UNIT/ML injection pen  meclizine 12.5 MG tablet         Discharge recommendations given to patient. Follow Up. pcp in 1 week   Disposition. home  Activity. activity as tolerated  Diet: No diet orders on file      Spent 45  minutes in discharge process.     Signed:  Kenny Boothe MD     11/15/2022 6:13 PM

## 2023-04-12 NOTE — TELEPHONE ENCOUNTER
Received call from [facility] at [facility]- MARLEN with Red Flag Complaint. Subjective: Caller states \"I am having dizziness in the mornings\"     Current Symptoms: Dizziness in the mornings upon standing. Does not drink much water. \"I have been peeing on myself\". Has no loss of bladder control, she is wetting herself due to her having to move more slowly on her scooter and that she has a depends on, so able to void and then can change it. Feeling more weak for the past month. Fatigue. Has neuropathy. No chest pain or SOB. History of stroke on L side-did well after rehab. Unable to walk without support. Onset: 1 week ago-dizziness; 1 month ago-Fatigue; worsening    Associated Symptoms: reduced activity, reduced appetite, increased sleepiness    Pain Severity:     Temperature: denies fever     What has been tried: Tylenol    LMP: NA Pregnant: NA    Recommended disposition: Call  Now    Care advice provided, patient verbalizes understanding; denies any other questions or concerns; instructed to call back for any new or worsening symptoms. Patient refused 911 at this time. Reinforced importance of calling for 911 several times. Will call if sx get worse thru the day. Not established with a PCP currently. Would like to see Dr. Shalini Quesada.  Transferred to [facility] to help patient with booking a new patient appt. Attention Provider: Thank you for allowing me to participate in the care of your patient. The patient was connected to triage in response to information provided to the ECC/PSC. Please do not respond through this encounter as the response is not directed to a shared pool.     Reason for Disposition   SEVERE weakness (i.e., unable to walk or barely able to walk, requires support) and new-onset or worsening    Protocols used: WEAKNESS (GENERALIZED) AND FATIGUE-ADULT-[place]
impaired balance/pain/decreased strength

## 2023-12-21 NOTE — PROGRESS NOTES
Atorvastatin Calcium Oral tablet  What is this medicine?  ATORVASTATIN (a TORE va sta tin) is known as a HMG-CoA reductase inhibitor or 'statin'. It lowers the level of cholesterol and triglycerides in the blood. This drug may also reduce the risk of heart attack, stroke, or other health problems in patients with risk factors for heart disease. Diet and lifestyle changes are often used with this drug.  This medicine may be used for other purposes; ask your health care provider or pharmacist if you have questions.  What should I tell my health care provider before I take this medicine?  They need to know if you have any of these conditions:  frequently drink alcoholic beverages  history of stroke, TIA  kidney disease  liver disease  muscle aches or weakness  other medical condition  an unusual or allergic reaction to atorvastatin, other medicines, foods, dyes, or preservatives  pregnant or trying to get pregnant  breast-feeding  How should I use this medicine?  Take this medicine by mouth with a glass of water. Follow the directions on the prescription label. You can take this medicine with or without food. Take your doses at regular intervals. Do not take your medicine more often than directed.  Talk to your pediatrician regarding the use of this medicine in children. While this drug may be prescribed for children as young as 10 years old for selected conditions, precautions do apply.  Overdosage: If you think you have taken too much of this medicine contact a poison control center or emergency room at once.  NOTE: This medicine is only for you. Do not share this medicine with others.  What if I miss a dose?  If you miss a dose, take it as soon as you can. If it is almost time for your next dose, take only that dose. Do not take double or extra doses.  What may interact with this medicine?  Do not take this medicine with any of the following medications:  red yeast  Subjective:      Patient ID: Marlin Benedict Saturday is a 67 y.o. female. Patient has noticed a lesion behind her left ear for the past few months which seems to be increasing to some degree. She's had no fever and no significant pain. She has progressed it and determined that there was a little bit of redundant material within it. She has been on no medications. He has had no other constitutional symptoms and has otherwise been in reasonably decent health. HPI    Review of Systems   Constitutional: Negative. HENT: Positive for ear discharge. Negative for congestion, dental problem, drooling, ear pain, hearing loss, mouth sores, nosebleeds, postnasal drip, rhinorrhea, sinus pain, sinus pressure, sneezing, sore throat, tinnitus, trouble swallowing and voice change. Eyes: Negative. Respiratory: Negative. Cardiovascular: Negative. Endocrine: Negative. Skin: Negative. Allergic/Immunologic: Negative. Neurological: Negative. Hematological: Negative. Psychiatric/Behavioral: Negative. Objective:   Physical Exam   Constitutional: She is oriented to person, place, and time. She appears well-developed and well-nourished. HENT:   Head: Normocephalic and atraumatic. Right Ear: External ear normal.   Left Ear: External ear normal.   Nose: Nose normal.   Mouth/Throat: Oropharynx is clear and moist. No oropharyngeal exudate. Eyes: Conjunctivae are normal. Pupils are equal, round, and reactive to light. Neck: Normal range of motion. Neck supple. No tracheal deviation present. No thyromegaly present. Examination reveals a 2 cm lesion which appears to be a lymph node located in the area posterior to the mastoid on the patient's left side. There is some purulent material exuding from it but is not particularly fluctuant indicative of an abscess. Culture and sensitivity is taken from the drainage. Cardiovascular: Normal rate and regular rhythm.     Pulmonary/Chest: Effort normal. rice  telaprevir  telithromycin  voriconazole  This medicine may also interact with the following medications:  alcohol  antiviral medicines for HIV or AIDS  boceprevir  certain antibiotics like clarithromycin, erythromycin, troleandomycin  certain medicines for cholesterol like fenofibrate or gemfibrozil  cimetidine  clarithromycin  colchicine  cyclosporine  digoxin  female hormones, like estrogens or progestins and birth control pills  grapefruit juice  medicines for fungal infections like fluconazole, itraconazole, ketoconazole  niacin  rifampin  spironolactone  This list may not describe all possible interactions. Give your health care provider a list of all the medicines, herbs, non-prescription drugs, or dietary supplements you use. Also tell them if you smoke, drink alcohol, or use illegal drugs. Some items may interact with your medicine.  What should I watch for while using this medicine?  Visit your doctor or health care professional for regular check-ups. You may need regular tests to make sure your liver is working properly.  Tell your doctor or health care professional right away if you get any unexplained muscle pain, tenderness, or weakness, especially if you also have a fever and tiredness. Your doctor or health care professional may tell you to stop taking this medicine if you develop muscle problems. If your muscle problems do not go away after stopping this medicine, contact your health care professional.  This drug is only part of a total heart-health program. Your doctor or a dietician can suggest a low-cholesterol and low-fat diet to help. Avoid alcohol and smoking, and keep a proper exercise schedule.  Do not use this drug if you are pregnant or breast-feeding. Serious side effects to an unborn child or to an infant are possible. Talk to your doctor or pharmacist for more information.  This medicine may affect blood sugar levels. If you have diabetes, check with your doctor or health care  professional before you change your diet or the dose of your diabetic medicine.  If you are going to have surgery tell your health care professional that you are taking this drug.  What side effects may I notice from receiving this medicine?  Side effects that you should report to your doctor or health care professional as soon as possible:  allergic reactions like skin rash, itching or hives, swelling of the face, lips, or tongue  dark urine  fever  joint pain  muscle cramps, pain  redness, blistering, peeling or loosening of the skin, including inside the mouth  trouble passing urine or change in the amount of urine  unusually weak or tired  yellowing of eyes or skin  Side effects that usually do not require medical attention (report to your doctor or health care professional if they continue or are bothersome):  constipation  heartburn  stomach gas, pain, upset  This list may not describe all possible side effects. Call your doctor for medical advice about side effects. You may report side effects to FDA at 2-734-UKJ-0912.  Where should I keep my medicine?  Keep out of the reach of children.  Store at room temperature between 20 to 25 degrees C (68 to 77 degrees F). Throw away any unused medicine after the expiration date.  NOTE:This sheet is a summary. It may not cover all possible information. If you have questions about this medicine, talk to your doctor, pharmacist, or health care provider. Copyright© 2016 [employer]                    NEUROLOGIST  [address] [address] [place] [id number]  [phone]

## 2025-06-19 ENCOUNTER — HOSPITAL ENCOUNTER (INPATIENT)
Age: 80
LOS: 4 days | Discharge: NURSING, REHAB OR LONG-TERM CARE | DRG: 690 | End: 2025-06-23
Attending: STUDENT IN AN ORGANIZED HEALTH CARE EDUCATION/TRAINING PROGRAM | Admitting: HOSPITALIST
Payer: MEDICARE

## 2025-06-19 ENCOUNTER — APPOINTMENT (OUTPATIENT)
Dept: GENERAL RADIOLOGY | Age: 80
DRG: 690 | End: 2025-06-19
Payer: MEDICARE

## 2025-06-19 DIAGNOSIS — N30.00 ACUTE CYSTITIS WITHOUT HEMATURIA: Primary | ICD-10-CM

## 2025-06-19 LAB
ALBUMIN SERPL-MCNC: 4 G/DL (ref 3.4–5)
ALBUMIN/GLOB SERPL: 1 {RATIO} (ref 1.1–2.2)
ALP SERPL-CCNC: 114 U/L (ref 40–129)
ALT SERPL-CCNC: 11 U/L (ref 10–40)
ANION GAP SERPL CALCULATED.3IONS-SCNC: 13 MMOL/L (ref 3–16)
AST SERPL-CCNC: 16 U/L (ref 15–37)
BACTERIA URNS QL MICRO: ABNORMAL /HPF
BASOPHILS # BLD: 0 K/UL (ref 0–0.2)
BASOPHILS NFR BLD: 0.7 %
BILIRUB SERPL-MCNC: 0.3 MG/DL (ref 0–1)
BILIRUB UR QL STRIP.AUTO: NEGATIVE
BUN SERPL-MCNC: 16 MG/DL (ref 7–20)
CALCIUM SERPL-MCNC: 9.9 MG/DL (ref 8.3–10.6)
CHLORIDE SERPL-SCNC: 103 MMOL/L (ref 99–110)
CLARITY UR: CLEAR
CO2 SERPL-SCNC: 25 MMOL/L (ref 21–32)
COLOR UR: YELLOW
CREAT SERPL-MCNC: 0.7 MG/DL (ref 0.6–1.2)
DEPRECATED RDW RBC AUTO: 14.5 % (ref 12.4–15.4)
EOSINOPHIL # BLD: 0.1 K/UL (ref 0–0.6)
EOSINOPHIL NFR BLD: 1.8 %
EPI CELLS #/AREA URNS AUTO: 0 /HPF (ref 0–5)
GFR SERPLBLD CREATININE-BSD FMLA CKD-EPI: 87 ML/MIN/{1.73_M2}
GLUCOSE SERPL-MCNC: 129 MG/DL (ref 70–99)
GLUCOSE UR STRIP.AUTO-MCNC: NEGATIVE MG/DL
HCT VFR BLD AUTO: 38.8 % (ref 36–48)
HGB BLD-MCNC: 12.5 G/DL (ref 12–16)
HGB UR QL STRIP.AUTO: NEGATIVE
HYALINE CASTS #/AREA URNS AUTO: 2 /LPF (ref 0–8)
KETONES UR STRIP.AUTO-MCNC: NEGATIVE MG/DL
LACTATE BLDV-SCNC: 1.5 MMOL/L (ref 0.4–1.9)
LACTATE BLDV-SCNC: 1.5 MMOL/L (ref 0.4–1.9)
LEUKOCYTE ESTERASE UR QL STRIP.AUTO: ABNORMAL
LIPASE SERPL-CCNC: 18 U/L (ref 13–60)
LYMPHOCYTES # BLD: 2.4 K/UL (ref 1–5.1)
LYMPHOCYTES NFR BLD: 47.8 %
MCH RBC QN AUTO: 26 PG (ref 26–34)
MCHC RBC AUTO-ENTMCNC: 32.3 G/DL (ref 31–36)
MCV RBC AUTO: 80.4 FL (ref 80–100)
MONOCYTES # BLD: 0.3 K/UL (ref 0–1.3)
MONOCYTES NFR BLD: 5.6 %
NEUTROPHILS # BLD: 2.2 K/UL (ref 1.7–7.7)
NEUTROPHILS NFR BLD: 44.1 %
NITRITE UR QL STRIP.AUTO: NEGATIVE
NT-PROBNP SERPL-MCNC: 358 PG/ML (ref 0–449)
PH UR STRIP.AUTO: 6.5 [PH] (ref 5–8)
PLATELET # BLD AUTO: 300 K/UL (ref 135–450)
PMV BLD AUTO: 7.1 FL (ref 5–10.5)
POTASSIUM SERPL-SCNC: 3.6 MMOL/L (ref 3.5–5.1)
PROT SERPL-MCNC: 8.2 G/DL (ref 6.4–8.2)
PROT UR STRIP.AUTO-MCNC: 30 MG/DL
RBC # BLD AUTO: 4.82 M/UL (ref 4–5.2)
RBC CLUMPS #/AREA URNS AUTO: 1 /HPF (ref 0–4)
SODIUM SERPL-SCNC: 141 MMOL/L (ref 136–145)
SP GR UR STRIP.AUTO: 1.01 (ref 1–1.03)
TROPONIN, HIGH SENSITIVITY: 11 NG/L (ref 0–14)
TROPONIN, HIGH SENSITIVITY: 12 NG/L (ref 0–14)
UA COMPLETE W REFLEX CULTURE PNL UR: YES
UA DIPSTICK W REFLEX MICRO PNL UR: YES
URN SPEC COLLECT METH UR: ABNORMAL
UROBILINOGEN UR STRIP-ACNC: 0.2 E.U./DL
WBC # BLD AUTO: 4.9 K/UL (ref 4–11)
WBC #/AREA URNS AUTO: 115 /HPF (ref 0–5)

## 2025-06-19 PROCEDURE — 93005 ELECTROCARDIOGRAM TRACING: CPT | Performed by: NURSE PRACTITIONER

## 2025-06-19 PROCEDURE — 2500000003 HC RX 250 WO HCPCS: Performed by: NURSE PRACTITIONER

## 2025-06-19 PROCEDURE — 84484 ASSAY OF TROPONIN QUANT: CPT

## 2025-06-19 PROCEDURE — 81001 URINALYSIS AUTO W/SCOPE: CPT

## 2025-06-19 PROCEDURE — 99285 EMERGENCY DEPT VISIT HI MDM: CPT

## 2025-06-19 PROCEDURE — 6360000002 HC RX W HCPCS: Performed by: NURSE PRACTITIONER

## 2025-06-19 PROCEDURE — 87086 URINE CULTURE/COLONY COUNT: CPT

## 2025-06-19 PROCEDURE — 85025 COMPLETE CBC W/AUTO DIFF WBC: CPT

## 2025-06-19 PROCEDURE — 96374 THER/PROPH/DIAG INJ IV PUSH: CPT

## 2025-06-19 PROCEDURE — 83880 ASSAY OF NATRIURETIC PEPTIDE: CPT

## 2025-06-19 PROCEDURE — 6370000000 HC RX 637 (ALT 250 FOR IP): Performed by: NURSE PRACTITIONER

## 2025-06-19 PROCEDURE — 80053 COMPREHEN METABOLIC PANEL: CPT

## 2025-06-19 PROCEDURE — 83690 ASSAY OF LIPASE: CPT

## 2025-06-19 PROCEDURE — 87186 SC STD MICRODIL/AGAR DIL: CPT

## 2025-06-19 PROCEDURE — 87077 CULTURE AEROBIC IDENTIFY: CPT

## 2025-06-19 PROCEDURE — 71045 X-RAY EXAM CHEST 1 VIEW: CPT

## 2025-06-19 PROCEDURE — 1200000000 HC SEMI PRIVATE

## 2025-06-19 PROCEDURE — 83605 ASSAY OF LACTIC ACID: CPT

## 2025-06-19 RX ORDER — MECLIZINE HYDROCHLORIDE 25 MG/1
25 TABLET ORAL 3 TIMES DAILY PRN
COMMUNITY

## 2025-06-19 RX ORDER — ACETAMINOPHEN 500 MG
1000 TABLET ORAL ONCE
Status: COMPLETED | OUTPATIENT
Start: 2025-06-19 | End: 2025-06-19

## 2025-06-19 RX ORDER — POLYETHYLENE GLYCOL 3350 17 G/17G
17 POWDER, FOR SOLUTION ORAL DAILY
COMMUNITY

## 2025-06-19 RX ORDER — ASPIRIN 81 MG/1
162 TABLET, CHEWABLE ORAL DAILY
COMMUNITY

## 2025-06-19 RX ORDER — GABAPENTIN 300 MG/1
300 CAPSULE ORAL ONCE
Status: COMPLETED | OUTPATIENT
Start: 2025-06-19 | End: 2025-06-19

## 2025-06-19 RX ADMIN — WATER 1000 MG: 1 INJECTION INTRAMUSCULAR; INTRAVENOUS; SUBCUTANEOUS at 21:32

## 2025-06-19 RX ADMIN — ACETAMINOPHEN 1000 MG: 500 TABLET ORAL at 21:32

## 2025-06-19 RX ADMIN — GABAPENTIN 300 MG: 300 CAPSULE ORAL at 21:31

## 2025-06-19 ASSESSMENT — PAIN SCALES - GENERAL
PAINLEVEL_OUTOF10: 0
PAINLEVEL_OUTOF10: 8

## 2025-06-19 ASSESSMENT — PAIN DESCRIPTION - DESCRIPTORS: DESCRIPTORS: ACHING

## 2025-06-19 ASSESSMENT — PAIN - FUNCTIONAL ASSESSMENT: PAIN_FUNCTIONAL_ASSESSMENT: 0-10

## 2025-06-19 ASSESSMENT — PAIN DESCRIPTION - LOCATION: LOCATION: LEG

## 2025-06-20 PROBLEM — Z71.89 DIABETES EDUCATION, ENCOUNTER FOR: Status: RESOLVED | Noted: 2022-11-05 | Resolved: 2025-06-20

## 2025-06-20 PROBLEM — B96.20 E. COLI UTI: Status: RESOLVED | Noted: 2022-11-02 | Resolved: 2025-06-20

## 2025-06-20 PROBLEM — N17.9 AKI (ACUTE KIDNEY INJURY): Status: RESOLVED | Noted: 2022-11-03 | Resolved: 2025-06-20

## 2025-06-20 PROBLEM — E87.6 HYPOKALEMIA: Status: RESOLVED | Noted: 2022-08-22 | Resolved: 2025-06-20

## 2025-06-20 PROBLEM — E11.65 HYPERGLYCEMIA DUE TO DIABETES MELLITUS (HCC): Status: RESOLVED | Noted: 2022-11-03 | Resolved: 2025-06-20

## 2025-06-20 PROBLEM — R42 DIZZINESS: Status: RESOLVED | Noted: 2022-08-19 | Resolved: 2025-06-20

## 2025-06-20 PROBLEM — E83.42 HYPOMAGNESEMIA: Status: RESOLVED | Noted: 2022-08-22 | Resolved: 2025-06-20

## 2025-06-20 PROBLEM — R53.81 PHYSICAL DEBILITY: Status: ACTIVE | Noted: 2025-06-20

## 2025-06-20 PROBLEM — R78.81 POSITIVE BLOOD CULTURES: Status: RESOLVED | Noted: 2022-11-03 | Resolved: 2025-06-20

## 2025-06-20 PROBLEM — N39.0 E. COLI UTI: Status: RESOLVED | Noted: 2022-11-02 | Resolved: 2025-06-20

## 2025-06-20 LAB
ANION GAP SERPL CALCULATED.3IONS-SCNC: 10 MMOL/L (ref 3–16)
BASOPHILS # BLD: 0 K/UL (ref 0–0.2)
BASOPHILS NFR BLD: 0.8 %
BUN SERPL-MCNC: 14 MG/DL (ref 7–20)
CALCIUM SERPL-MCNC: 9.6 MG/DL (ref 8.3–10.6)
CHLORIDE SERPL-SCNC: 104 MMOL/L (ref 99–110)
CO2 SERPL-SCNC: 28 MMOL/L (ref 21–32)
CREAT SERPL-MCNC: 0.8 MG/DL (ref 0.6–1.2)
DEPRECATED RDW RBC AUTO: 14 % (ref 12.4–15.4)
EKG ATRIAL RATE: 88 BPM
EKG DIAGNOSIS: NORMAL
EKG P AXIS: 20 DEGREES
EKG P-R INTERVAL: 184 MS
EKG Q-T INTERVAL: 424 MS
EKG QRS DURATION: 148 MS
EKG QTC CALCULATION (BAZETT): 513 MS
EKG R AXIS: -43 DEGREES
EKG T AXIS: 0 DEGREES
EKG VENTRICULAR RATE: 88 BPM
EOSINOPHIL # BLD: 0.1 K/UL (ref 0–0.6)
EOSINOPHIL NFR BLD: 1.8 %
GFR SERPLBLD CREATININE-BSD FMLA CKD-EPI: 74 ML/MIN/{1.73_M2}
GLUCOSE BLD-MCNC: 102 MG/DL (ref 70–99)
GLUCOSE BLD-MCNC: 116 MG/DL (ref 70–99)
GLUCOSE BLD-MCNC: 135 MG/DL (ref 70–99)
GLUCOSE BLD-MCNC: 161 MG/DL (ref 70–99)
GLUCOSE BLD-MCNC: 169 MG/DL (ref 70–99)
GLUCOSE SERPL-MCNC: 119 MG/DL (ref 70–99)
HCT VFR BLD AUTO: 35.9 % (ref 36–48)
HGB BLD-MCNC: 11.7 G/DL (ref 12–16)
LYMPHOCYTES # BLD: 2.6 K/UL (ref 1–5.1)
LYMPHOCYTES NFR BLD: 50.5 %
MAGNESIUM SERPL-MCNC: 2.03 MG/DL (ref 1.8–2.4)
MCH RBC QN AUTO: 26.2 PG (ref 26–34)
MCHC RBC AUTO-ENTMCNC: 32.5 G/DL (ref 31–36)
MCV RBC AUTO: 80.6 FL (ref 80–100)
MONOCYTES # BLD: 0.4 K/UL (ref 0–1.3)
MONOCYTES NFR BLD: 8.1 %
NEUTROPHILS # BLD: 2 K/UL (ref 1.7–7.7)
NEUTROPHILS NFR BLD: 38.8 %
PERFORMED ON: ABNORMAL
PLATELET # BLD AUTO: 284 K/UL (ref 135–450)
PMV BLD AUTO: 7.1 FL (ref 5–10.5)
POTASSIUM SERPL-SCNC: 4 MMOL/L (ref 3.5–5.1)
RBC # BLD AUTO: 4.46 M/UL (ref 4–5.2)
SODIUM SERPL-SCNC: 142 MMOL/L (ref 136–145)
WBC # BLD AUTO: 5.1 K/UL (ref 4–11)

## 2025-06-20 PROCEDURE — 6360000002 HC RX W HCPCS: Performed by: NURSE PRACTITIONER

## 2025-06-20 PROCEDURE — 83735 ASSAY OF MAGNESIUM: CPT

## 2025-06-20 PROCEDURE — 2500000003 HC RX 250 WO HCPCS: Performed by: NURSE PRACTITIONER

## 2025-06-20 PROCEDURE — 6370000000 HC RX 637 (ALT 250 FOR IP): Performed by: NURSE PRACTITIONER

## 2025-06-20 PROCEDURE — 36415 COLL VENOUS BLD VENIPUNCTURE: CPT

## 2025-06-20 PROCEDURE — 97530 THERAPEUTIC ACTIVITIES: CPT

## 2025-06-20 PROCEDURE — 97162 PT EVAL MOD COMPLEX 30 MIN: CPT

## 2025-06-20 PROCEDURE — 1200000000 HC SEMI PRIVATE

## 2025-06-20 PROCEDURE — 97166 OT EVAL MOD COMPLEX 45 MIN: CPT

## 2025-06-20 PROCEDURE — 85025 COMPLETE CBC W/AUTO DIFF WBC: CPT

## 2025-06-20 PROCEDURE — 97535 SELF CARE MNGMENT TRAINING: CPT

## 2025-06-20 PROCEDURE — 80048 BASIC METABOLIC PNL TOTAL CA: CPT

## 2025-06-20 PROCEDURE — 93010 ELECTROCARDIOGRAM REPORT: CPT | Performed by: INTERNAL MEDICINE

## 2025-06-20 RX ORDER — SODIUM CHLORIDE 0.9 % (FLUSH) 0.9 %
5-40 SYRINGE (ML) INJECTION EVERY 12 HOURS SCHEDULED
Status: DISCONTINUED | OUTPATIENT
Start: 2025-06-20 | End: 2025-06-23 | Stop reason: HOSPADM

## 2025-06-20 RX ORDER — SODIUM CHLORIDE 9 MG/ML
INJECTION, SOLUTION INTRAVENOUS PRN
Status: DISCONTINUED | OUTPATIENT
Start: 2025-06-20 | End: 2025-06-23 | Stop reason: HOSPADM

## 2025-06-20 RX ORDER — MAGNESIUM SULFATE IN WATER 40 MG/ML
2000 INJECTION, SOLUTION INTRAVENOUS PRN
Status: DISCONTINUED | OUTPATIENT
Start: 2025-06-20 | End: 2025-06-23 | Stop reason: HOSPADM

## 2025-06-20 RX ORDER — GABAPENTIN 300 MG/1
300 CAPSULE ORAL 2 TIMES DAILY
Status: DISCONTINUED | OUTPATIENT
Start: 2025-06-20 | End: 2025-06-23 | Stop reason: HOSPADM

## 2025-06-20 RX ORDER — POLYETHYLENE GLYCOL 3350 17 G/17G
17 POWDER, FOR SOLUTION ORAL DAILY PRN
Status: DISCONTINUED | OUTPATIENT
Start: 2025-06-20 | End: 2025-06-23 | Stop reason: HOSPADM

## 2025-06-20 RX ORDER — LISINOPRIL 10 MG/1
10 TABLET ORAL DAILY
Status: DISCONTINUED | OUTPATIENT
Start: 2025-06-20 | End: 2025-06-23 | Stop reason: HOSPADM

## 2025-06-20 RX ORDER — SODIUM CHLORIDE 0.9 % (FLUSH) 0.9 %
5-40 SYRINGE (ML) INJECTION PRN
Status: DISCONTINUED | OUTPATIENT
Start: 2025-06-20 | End: 2025-06-23 | Stop reason: HOSPADM

## 2025-06-20 RX ORDER — AMLODIPINE BESYLATE 5 MG/1
10 TABLET ORAL DAILY
Status: DISCONTINUED | OUTPATIENT
Start: 2025-06-20 | End: 2025-06-23 | Stop reason: HOSPADM

## 2025-06-20 RX ORDER — GLUCAGON 1 MG/ML
1 KIT INJECTION PRN
Status: DISCONTINUED | OUTPATIENT
Start: 2025-06-20 | End: 2025-06-23 | Stop reason: HOSPADM

## 2025-06-20 RX ORDER — ENOXAPARIN SODIUM 100 MG/ML
40 INJECTION SUBCUTANEOUS DAILY
Status: DISCONTINUED | OUTPATIENT
Start: 2025-06-20 | End: 2025-06-23 | Stop reason: HOSPADM

## 2025-06-20 RX ORDER — DEXTROSE MONOHYDRATE 100 MG/ML
INJECTION, SOLUTION INTRAVENOUS CONTINUOUS PRN
Status: DISCONTINUED | OUTPATIENT
Start: 2025-06-20 | End: 2025-06-23 | Stop reason: HOSPADM

## 2025-06-20 RX ORDER — ONDANSETRON 4 MG/1
4 TABLET, ORALLY DISINTEGRATING ORAL EVERY 8 HOURS PRN
Status: DISCONTINUED | OUTPATIENT
Start: 2025-06-20 | End: 2025-06-23 | Stop reason: HOSPADM

## 2025-06-20 RX ORDER — INSULIN GLARGINE 100 [IU]/ML
12 INJECTION, SOLUTION SUBCUTANEOUS NIGHTLY
Status: DISCONTINUED | OUTPATIENT
Start: 2025-06-20 | End: 2025-06-23 | Stop reason: HOSPADM

## 2025-06-20 RX ORDER — ACETAMINOPHEN 325 MG/1
650 TABLET ORAL EVERY 6 HOURS PRN
Status: DISCONTINUED | OUTPATIENT
Start: 2025-06-20 | End: 2025-06-23 | Stop reason: HOSPADM

## 2025-06-20 RX ORDER — POTASSIUM CHLORIDE 1500 MG/1
40 TABLET, EXTENDED RELEASE ORAL PRN
Status: DISCONTINUED | OUTPATIENT
Start: 2025-06-20 | End: 2025-06-23 | Stop reason: HOSPADM

## 2025-06-20 RX ORDER — ONDANSETRON 2 MG/ML
4 INJECTION INTRAMUSCULAR; INTRAVENOUS EVERY 6 HOURS PRN
Status: DISCONTINUED | OUTPATIENT
Start: 2025-06-20 | End: 2025-06-23 | Stop reason: HOSPADM

## 2025-06-20 RX ORDER — INSULIN LISPRO 100 [IU]/ML
0-8 INJECTION, SOLUTION INTRAVENOUS; SUBCUTANEOUS
Status: DISCONTINUED | OUTPATIENT
Start: 2025-06-20 | End: 2025-06-23 | Stop reason: HOSPADM

## 2025-06-20 RX ORDER — HYDRALAZINE HYDROCHLORIDE 20 MG/ML
10 INJECTION INTRAMUSCULAR; INTRAVENOUS EVERY 6 HOURS PRN
Status: DISCONTINUED | OUTPATIENT
Start: 2025-06-20 | End: 2025-06-23 | Stop reason: HOSPADM

## 2025-06-20 RX ORDER — ATORVASTATIN CALCIUM 40 MG/1
40 TABLET, FILM COATED ORAL NIGHTLY
Status: DISCONTINUED | OUTPATIENT
Start: 2025-06-20 | End: 2025-06-23 | Stop reason: HOSPADM

## 2025-06-20 RX ORDER — POTASSIUM CHLORIDE 7.45 MG/ML
10 INJECTION INTRAVENOUS PRN
Status: DISCONTINUED | OUTPATIENT
Start: 2025-06-20 | End: 2025-06-23 | Stop reason: HOSPADM

## 2025-06-20 RX ORDER — ACETAMINOPHEN 650 MG/1
650 SUPPOSITORY RECTAL EVERY 6 HOURS PRN
Status: DISCONTINUED | OUTPATIENT
Start: 2025-06-20 | End: 2025-06-23 | Stop reason: HOSPADM

## 2025-06-20 RX ORDER — DULOXETIN HYDROCHLORIDE 20 MG/1
20 CAPSULE, DELAYED RELEASE ORAL DAILY
Status: DISCONTINUED | OUTPATIENT
Start: 2025-06-20 | End: 2025-06-20

## 2025-06-20 RX ADMIN — INSULIN GLARGINE 12 UNITS: 100 INJECTION, SOLUTION SUBCUTANEOUS at 22:08

## 2025-06-20 RX ADMIN — AMLODIPINE BESYLATE 10 MG: 5 TABLET ORAL at 08:52

## 2025-06-20 RX ADMIN — ENOXAPARIN SODIUM 40 MG: 100 INJECTION SUBCUTANEOUS at 08:53

## 2025-06-20 RX ADMIN — WATER 1000 MG: 1 INJECTION INTRAMUSCULAR; INTRAVENOUS; SUBCUTANEOUS at 22:04

## 2025-06-20 RX ADMIN — LISINOPRIL 10 MG: 10 TABLET ORAL at 08:53

## 2025-06-20 RX ADMIN — SODIUM CHLORIDE, PRESERVATIVE FREE 10 ML: 5 INJECTION INTRAVENOUS at 22:03

## 2025-06-20 RX ADMIN — GABAPENTIN 300 MG: 300 CAPSULE ORAL at 08:53

## 2025-06-20 RX ADMIN — GABAPENTIN 300 MG: 300 CAPSULE ORAL at 22:04

## 2025-06-20 RX ADMIN — HYDRALAZINE HYDROCHLORIDE 10 MG: 20 INJECTION INTRAMUSCULAR; INTRAVENOUS at 04:39

## 2025-06-20 RX ADMIN — ATORVASTATIN CALCIUM 40 MG: 40 TABLET, FILM COATED ORAL at 22:03

## 2025-06-20 RX ADMIN — SODIUM CHLORIDE, PRESERVATIVE FREE 10 ML: 5 INJECTION INTRAVENOUS at 08:53

## 2025-06-20 NOTE — CARE COORDINATION
Discharge Planning Note Re: Skilled Nursing     CM/SW noted consult for discharge planning. Chart reviewed. Noted recommendations for SNF.  CM met with patient bedside. Introduced self and explained role of CM and discharge planning.    Patient is agreeable to SNF on dc.     [place] of choice list was provided with basic dialogue that supports the patient's individualized plan of care/goals, treatment preferences and shares the quality data associated with the providers. [x] Yes [] No.  Patient selected facilities.    Referral made to the following through [facility]:    [facility]  [address] [place] [address]  Phone: [phone]  Fax: [phone]     [facility]  [address] [place] [address]  Phone:[phone]  Fax:[phone]     [facility]  [address] [place] [address]  Phone: [phone]  Fax: [phone] ([place])          [phone] ([place])     CM/SW will follow-up on referrals and provide any additional documentation necessary to facilitate placement.     Electronically signed by DIONNA Ibarra on 6/20/2025 at 5:50 PM

## 2025-06-20 NOTE — H&P
V2.0  History and Physical      Name:  Naomie Saturday /Age/Sex: 1945  (80 y.o. female)   MRN & CSN:  [id number] & [id number] Encounter Date/Time: 2025 10:44 PM EDT   Location:  ED- PCP: Anahy Arriaza APRN - CNP       Hospital Day: 1    Assessment and Plan:   Naomie magy is a 80 y.o. female with a pmh of DM2, history of CVA, hypertension and hyperlipidemia.  She presents with progressive weakness and fatigue over a week to 2 weeks duration.  She reports that she can hardly get out of bed and then perform her ADLs now due to the weakness.  She is found to have UTI.  She has been admitted for further management.  She lives at home with her grandson and request that she wants to be placed.    Hospital Problems           Last Modified POA    * (Principal) Acute cystitis without hematuria 2025 Yes       Plan:  # Acute cystitis without hematuria.  #Generalized weakness and fatigue likely due to above  -Chest x-ray clear.  CBC and CMP unremarkable.  - Admit with telemetry.  - Continue Rocephin pending cultures.  - Consult PT, OT and case management  - Fall precautions.    # Hypertension.  Keep goal SBP less than 160.  As needed hydralazine.  Resume home antihypertensives.    # Type 2 diabetes on long-term insulin.  With neuropathy.  Resume home insulin.  SSI.  Carb controlled diet.  Hypoglycemic protocol as needed.  Check blood glucose ACHS.      Disposition:   Current Living situation: She lives at home with her grandson  Expected Disposition: She is requesting placement  Estimated D/C: 1 to 2 days    Diet Adult diet   DVT Prophylaxis [x] Lovenox, []  Heparin, [] SCDs, [] Ambulation,  [] Eliquis, [] Xarelto, [] Coumadin   Code Status Full code.  Discussed with the patient and her grandson at bedside.   Surrogate Decision Maker/ POA Self, Luis Carlos-ros     Personally reviewed Lab Studies and Imaging     Discussed management of the case with Dr. Calzada who recommended continue

## 2025-06-20 NOTE — CARE COORDINATION
Discharge Planning Note:    Chart reviewed and it appears that patient has minimal needs for discharge at this time. Risk Score 11 %     Primary Care Physician is KASSANDRA LLAMAS   Primary insurance is Medicare    Patient is from home.   Please notify case management if any discharge needs are identified.      Case management will continue to follow progress and update discharge plan as needed.    Electronically signed by DIONNA Ibarra on 6/20/2025 at 8:19 AM

## 2025-06-20 NOTE — PLAN OF CARE
Problem: Chronic Conditions and Co-morbidities  Goal: Patient's chronic conditions and co-morbidity symptoms are monitored and maintained or improved  6/20/2025 0855 by Dunia Blue RN  Outcome: Progressing  6/20/2025 0239 by Juvenal Pantoja RN  Outcome: Progressing     Problem: Discharge Planning  Goal: Discharge to home or other facility with appropriate resources  6/20/2025 0855 by Dunia Blue RN  Outcome: Progressing  6/20/2025 0239 by Juvenal Pantoja RN  Outcome: Progressing     Problem: Pain  Goal: Verbalizes/displays adequate comfort level or baseline comfort level  6/20/2025 0855 by Dunia Blue RN  Outcome: Progressing  6/20/2025 0239 by Juvenal Pantoja RN  Outcome: Progressing     Problem: Skin/Tissue Integrity  Goal: Skin integrity remains intact  Description: 1.  Monitor for areas of redness and/or skin breakdown  2.  Assess vascular access sites hourly  3.  Every 4-6 hours minimum:  Change oxygen saturation probe site  4.  Every 4-6 hours:  If on nasal continuous positive airway pressure, respiratory therapy assess nares and determine need for appliance change or resting period  6/20/2025 0855 by Dunia Blue RN  Outcome: Progressing  6/20/2025 0239 by Juvenal Pantoja RN  Outcome: Progressing     Problem: ABCDS Injury Assessment  Goal: Absence of physical injury  6/20/2025 0855 by Dunia Blue RN  Outcome: Progressing  6/20/2025 0239 by Juvenal Pantoja RN  Outcome: Progressing     Problem: Safety - Adult  Goal: Free from fall injury  Outcome: Progressing

## 2025-06-20 NOTE — ED PROVIDER NOTES
[facility]  EMERGENCY DEPARTMENT ENCOUNTER        Pt Name: Naomie Saturday  MRN: [id number]  Birthdate 1945  Date of evaluation: 6/19/2025  Provider: STACEY Barriga CNP  PCP: Anahy Arriaza APRN - CNP  Note Started: 7:00 PM EDT 6/19/25       I have seen and evaluated this patient with my supervising physician adam      CHIEF COMPLAINT       Chief Complaint   Patient presents with    Fatigue     Pt arrives from home by [facility]. Pt C/O fatigue for a wk. Per EMS, pt's family believes she is having a UTI d/t similar symptoms she is having.        HISTORY OF PRESENT ILLNESS: 1 or more Elements     History From: patient and ems  Limitations to history : None    Naomie Saturmagy is a 80 y.o. female who presents to the emergency department for concern of possible urinary tract infection.  EMS reports that they were called for altered mental status but that the patient is altered.  There is no family at the bedside.  The patient is awake and alert, she states that she does have some fatigue as well as shortness of breath but no specific symptoms.  Reports that she otherwise feels well.    Denies any headache, fever, lightheadedness, dizziness, visual disturbances.  No chest pain or pressure.  No neck or back pain.  No cough, or congestion.  No abdominal pain, nausea, vomiting, diarrhea, constipation, or dysuria.  No rash.    Nursing Notes were all reviewed and agreed with or any disagreements were addressed in the HPI.    REVIEW OF SYSTEMS :      Review of Systems   Constitutional:  Positive for fatigue. Negative for activity change, chills and fever.   Respiratory:  Positive for shortness of breath. Negative for chest tightness.    Cardiovascular:  Negative for chest pain.   Gastrointestinal:  Negative for abdominal pain, diarrhea, nausea and vomiting.   Genitourinary:  Negative for dysuria.   All other systems reviewed and are negative.      Positives and 
100.0 fL    MCH 26.0 26.0 - 34.0 pg    MCHC 32.3 31.0 - 36.0 g/dL    RDW 14.5 12.4 - 15.4 %    Platelets 300 135 - 450 K/uL    MPV 7.1 5.0 - 10.5 fL    Neutrophils % 44.1 %    Lymphocytes % 47.8 %    Monocytes % 5.6 %    Eosinophils % 1.8 %    Basophils % 0.7 %    Neutrophils Absolute 2.2 1.7 - 7.7 K/uL    Lymphocytes Absolute 2.4 1.0 - 5.1 K/uL    Monocytes Absolute 0.3 0.0 - 1.3 K/uL    Eosinophils Absolute 0.1 0.0 - 0.6 K/uL    Basophils Absolute 0.0 0.0 - 0.2 K/uL   Comprehensive Metabolic Panel   Result Value Ref Range    Sodium 141 136 - 145 mmol/L    Potassium 3.6 3.5 - 5.1 mmol/L    Chloride 103 99 - 110 mmol/L    CO2 25 21 - 32 mmol/L    Anion Gap 13 3 - 16    Glucose 129 (H) 70 - 99 mg/dL    BUN 16 7 - 20 mg/dL    Creatinine 0.7 0.6 - 1.2 mg/dL    Est, Glom Filt Rate 87 >60    Calcium 9.9 8.3 - 10.6 mg/dL    Total Protein 8.2 6.4 - 8.2 g/dL    Albumin 4.0 3.4 - 5.0 g/dL    Albumin/Globulin Ratio 1.0 (L) 1.1 - 2.2    Total Bilirubin 0.3 0.0 - 1.0 mg/dL    Alkaline Phosphatase 114 40 - 129 U/L    ALT 11 10 - 40 U/L    AST 16 15 - 37 U/L   Lactate, Sepsis   Result Value Ref Range    Lactic Acid, Sepsis 1.5 0.4 - 1.9 mmol/L   Lactate, Sepsis   Result Value Ref Range    Lactic Acid, Sepsis 1.5 0.4 - 1.9 mmol/L   Lipase   Result Value Ref Range    Lipase 18.0 13.0 - 60.0 U/L   Urinalysis with Reflex to Culture    Specimen: Urine   Result Value Ref Range    Color, UA Yellow Straw/Yellow    Clarity, UA Clear Clear    Glucose, Ur Negative Negative mg/dL    Bilirubin, Urine Negative Negative    Ketones, Urine Negative Negative mg/dL    Specific Gravity, UA 1.010 1.005 - 1.030    Blood, Urine Negative Negative    pH, Urine 6.5 5.0 - 8.0    Protein, UA 30 (A) Negative mg/dL    Urobilinogen, Urine 0.2 <2.0 E.U./dL    Nitrite, Urine Negative Negative    Leukocyte Esterase, Urine LARGE (A) Negative    Microscopic Examination YES     Urine Type NotGiven     Urine Reflex to Culture Yes    Troponin   Result Value Ref Range

## 2025-06-20 NOTE — PLAN OF CARE
Problem: Chronic Conditions and Co-morbidities  Goal: Patient's chronic conditions and co-morbidity symptoms are monitored and maintained or improved  Outcome: Progressing     Problem: Discharge Planning  Goal: Discharge to home or other facility with appropriate resources  Outcome: Progressing     Problem: Pain  Goal: Verbalizes/displays adequate comfort level or baseline comfort level  Outcome: Progressing     Problem: Skin/Tissue Integrity  Goal: Skin integrity remains intact  Description: 1.  Monitor for areas of redness and/or skin breakdown  2.  Assess vascular access sites hourly  3.  Every 4-6 hours minimum:  Change oxygen saturation probe site  4.  Every 4-6 hours:  If on nasal continuous positive airway pressure, respiratory therapy assess nares and determine need for appliance change or resting period  Outcome: Progressing     Problem: ABCDS Injury Assessment  Goal: Absence of physical injury  Outcome: Progressing

## 2025-06-20 NOTE — ED NOTES
Patient Name: Naomie Saturday  : 1945 80 y.o.  MRN: [id number]  ED Room #: ED-0011/11     Chief complaint:   Chief Complaint   Patient presents with    Fatigue     Pt arrives from home by [facility]. Pt C/O fatigue for a wk. Per EMS, pt's family believes she is having a UTI d/t similar symptoms she is having.      Hospital Problem/Diagnosis:   Hospital Problems           Last Modified POA    * (Principal) Acute cystitis without hematuria 2025 Yes         O2 Flow Rate:O2 Device: None (Room air)   (if applicable)  Cardiac Rhythm:   (if applicable)  Active LDA's:   Peripheral IV 25 Left Antecubital (Active)            How does patient ambulate? Bed Bound, uses wheel chair     2. How does patient take pills? Whole with Water    3. Is patient alert? Alert    4. Is patient oriented? To Person, To Place, To Time, To Situation, and Follows Commands    5.   Patient arrived from:  home  Facility Name: ___________________________________________    6. If patient is disoriented or from a Skill Nursing Facility has family been notified of admission? No    7. Patient belongings? Belongings: Clothing    Disposition of belongings? Kept with Patient     8. Any specific patient or family belongings/needs/dynamics?   a. N/a    9. Miscellaneous comments/pending orders?  a. N/a      If there are any additional questions please reach out to the Emergency Department.

## 2025-06-21 LAB
GLUCOSE BLD-MCNC: 133 MG/DL (ref 70–99)
GLUCOSE BLD-MCNC: 143 MG/DL (ref 70–99)
GLUCOSE BLD-MCNC: 168 MG/DL (ref 70–99)
GLUCOSE BLD-MCNC: 83 MG/DL (ref 70–99)
PERFORMED ON: ABNORMAL
PERFORMED ON: NORMAL

## 2025-06-21 PROCEDURE — 6360000002 HC RX W HCPCS: Performed by: NURSE PRACTITIONER

## 2025-06-21 PROCEDURE — 2500000003 HC RX 250 WO HCPCS: Performed by: NURSE PRACTITIONER

## 2025-06-21 PROCEDURE — 1200000000 HC SEMI PRIVATE

## 2025-06-21 PROCEDURE — 6370000000 HC RX 637 (ALT 250 FOR IP): Performed by: NURSE PRACTITIONER

## 2025-06-21 RX ADMIN — AMLODIPINE BESYLATE 10 MG: 5 TABLET ORAL at 07:55

## 2025-06-21 RX ADMIN — GABAPENTIN 300 MG: 300 CAPSULE ORAL at 21:51

## 2025-06-21 RX ADMIN — WATER 1000 MG: 1 INJECTION INTRAMUSCULAR; INTRAVENOUS; SUBCUTANEOUS at 21:51

## 2025-06-21 RX ADMIN — LISINOPRIL 10 MG: 10 TABLET ORAL at 07:54

## 2025-06-21 RX ADMIN — ACETAMINOPHEN 650 MG: 325 TABLET ORAL at 15:04

## 2025-06-21 RX ADMIN — ENOXAPARIN SODIUM 40 MG: 100 INJECTION SUBCUTANEOUS at 07:55

## 2025-06-21 RX ADMIN — SODIUM CHLORIDE, PRESERVATIVE FREE 10 ML: 5 INJECTION INTRAVENOUS at 21:53

## 2025-06-21 RX ADMIN — ATORVASTATIN CALCIUM 40 MG: 40 TABLET, FILM COATED ORAL at 21:50

## 2025-06-21 RX ADMIN — GABAPENTIN 300 MG: 300 CAPSULE ORAL at 07:55

## 2025-06-21 RX ADMIN — SODIUM CHLORIDE, PRESERVATIVE FREE 10 ML: 5 INJECTION INTRAVENOUS at 07:55

## 2025-06-21 RX ADMIN — INSULIN GLARGINE 12 UNITS: 100 INJECTION, SOLUTION SUBCUTANEOUS at 21:50

## 2025-06-21 NOTE — CARE COORDINATION
06/21/25 0845   Nursing Home/SNF - New   SNF Status open   SNF Last Pac Response accepted   SNF Pac Name [facility]   SNF Last Comment Sent   (Pt does not want this faciity)   Patient does not want this facility.    Electronically signed by Angela Lamas RN on 6/22/2025 at 11:38 AM

## 2025-06-21 NOTE — PLAN OF CARE
Problem: Chronic Conditions and Co-morbidities  Goal: Patient's chronic conditions and co-morbidity symptoms are monitored and maintained or improved  6/21/2025 0756 by Dunia Blue RN  Outcome: Progressing  6/21/2025 0120 by Penelope Antony RN  Outcome: Progressing     Problem: Discharge Planning  Goal: Discharge to home or other facility with appropriate resources  6/21/2025 0756 by Dunia Blue RN  Outcome: Progressing  6/21/2025 0120 by Penelope Antony RN  Outcome: Progressing     Problem: Pain  Goal: Verbalizes/displays adequate comfort level or baseline comfort level  6/21/2025 0756 by Dunia Blue RN  Outcome: Progressing  6/21/2025 0120 by Penelope Antony RN  Outcome: Progressing     Problem: Skin/Tissue Integrity  Goal: Skin integrity remains intact  Description: 1.  Monitor for areas of redness and/or skin breakdown  2.  Assess vascular access sites hourly  3.  Every 4-6 hours minimum:  Change oxygen saturation probe site  4.  Every 4-6 hours:  If on nasal continuous positive airway pressure, respiratory therapy assess nares and determine need for appliance change or resting period  6/21/2025 0756 by Dunia Blue RN  Outcome: Progressing  6/21/2025 0120 by Penelope Antony RN  Outcome: Progressing     Problem: ABCDS Injury Assessment  Goal: Absence of physical injury  6/21/2025 0756 by Dunia Blue RN  Outcome: Progressing  6/21/2025 0120 by Penelope Antony RN  Outcome: Progressing     Problem: Safety - Adult  Goal: Free from fall injury  6/21/2025 0756 by Dunia Blue RN  Outcome: Progressing  6/21/2025 0120 by Penelope Antony RN  Outcome: Progressing

## 2025-06-22 LAB
BACTERIA UR CULT: ABNORMAL
BACTERIA UR CULT: ABNORMAL
GLUCOSE BLD-MCNC: 125 MG/DL (ref 70–99)
GLUCOSE BLD-MCNC: 180 MG/DL (ref 70–99)
GLUCOSE BLD-MCNC: 215 MG/DL (ref 70–99)
GLUCOSE BLD-MCNC: 84 MG/DL (ref 70–99)
ORGANISM: ABNORMAL
PERFORMED ON: ABNORMAL
PERFORMED ON: NORMAL

## 2025-06-22 PROCEDURE — 6360000002 HC RX W HCPCS: Performed by: NURSE PRACTITIONER

## 2025-06-22 PROCEDURE — 1200000000 HC SEMI PRIVATE

## 2025-06-22 PROCEDURE — 2500000003 HC RX 250 WO HCPCS: Performed by: NURSE PRACTITIONER

## 2025-06-22 PROCEDURE — 2580000003 HC RX 258: Performed by: INTERNAL MEDICINE

## 2025-06-22 PROCEDURE — 6370000000 HC RX 637 (ALT 250 FOR IP): Performed by: NURSE PRACTITIONER

## 2025-06-22 PROCEDURE — 6360000002 HC RX W HCPCS: Performed by: INTERNAL MEDICINE

## 2025-06-22 RX ADMIN — GABAPENTIN 300 MG: 300 CAPSULE ORAL at 20:08

## 2025-06-22 RX ADMIN — GABAPENTIN 300 MG: 300 CAPSULE ORAL at 09:09

## 2025-06-22 RX ADMIN — LISINOPRIL 10 MG: 10 TABLET ORAL at 09:09

## 2025-06-22 RX ADMIN — ATORVASTATIN CALCIUM 40 MG: 40 TABLET, FILM COATED ORAL at 20:08

## 2025-06-22 RX ADMIN — SODIUM CHLORIDE, PRESERVATIVE FREE 10 ML: 5 INJECTION INTRAVENOUS at 20:18

## 2025-06-22 RX ADMIN — SODIUM CHLORIDE 3000 MG: 9 INJECTION, SOLUTION INTRAVENOUS at 13:48

## 2025-06-22 RX ADMIN — AMLODIPINE BESYLATE 10 MG: 5 TABLET ORAL at 09:09

## 2025-06-22 RX ADMIN — SODIUM CHLORIDE 3000 MG: 9 INJECTION, SOLUTION INTRAVENOUS at 20:18

## 2025-06-22 RX ADMIN — ENOXAPARIN SODIUM 40 MG: 100 INJECTION SUBCUTANEOUS at 09:10

## 2025-06-22 RX ADMIN — POLYETHYLENE GLYCOL 3350 17 G: 17 POWDER, FOR SOLUTION ORAL at 21:14

## 2025-06-22 RX ADMIN — INSULIN LISPRO 2 UNITS: 100 INJECTION, SOLUTION INTRAVENOUS; SUBCUTANEOUS at 17:09

## 2025-06-22 RX ADMIN — SODIUM CHLORIDE, PRESERVATIVE FREE 10 ML: 5 INJECTION INTRAVENOUS at 09:09

## 2025-06-22 RX ADMIN — INSULIN LISPRO 2 UNITS: 100 INJECTION, SOLUTION INTRAVENOUS; SUBCUTANEOUS at 20:07

## 2025-06-22 RX ADMIN — INSULIN GLARGINE 12 UNITS: 100 INJECTION, SOLUTION SUBCUTANEOUS at 20:08

## 2025-06-22 NOTE — PLAN OF CARE
Problem: Chronic Conditions and Co-morbidities  Goal: Patient's chronic conditions and co-morbidity symptoms are monitored and maintained or improved  6/22/2025 0910 by Dunia Blue RN  Outcome: Progressing  6/22/2025 0014 by Penelope Antony RN  Outcome: Progressing     Problem: Discharge Planning  Goal: Discharge to home or other facility with appropriate resources  6/22/2025 0910 by Dunia Blue RN  Outcome: Progressing  6/22/2025 0014 by Penelope Antony RN  Outcome: Progressing     Problem: Pain  Goal: Verbalizes/displays adequate comfort level or baseline comfort level  6/22/2025 0910 by Dunia Blue RN  Outcome: Progressing  6/22/2025 0014 by Penelope Antony RN  Outcome: Progressing     Problem: Skin/Tissue Integrity  Goal: Skin integrity remains intact  Description: 1.  Monitor for areas of redness and/or skin breakdown  2.  Assess vascular access sites hourly  3.  Every 4-6 hours minimum:  Change oxygen saturation probe site  4.  Every 4-6 hours:  If on nasal continuous positive airway pressure, respiratory therapy assess nares and determine need for appliance change or resting period  6/22/2025 0910 by Dunia Blue RN  Outcome: Progressing  6/22/2025 0014 by Penelope Antony RN  Outcome: Progressing     Problem: ABCDS Injury Assessment  Goal: Absence of physical injury  6/22/2025 0910 by Dunia Blue RN  Outcome: Progressing  6/22/2025 0014 by Penelope Antony RN  Outcome: Progressing     Problem: Safety - Adult  Goal: Free from fall injury  6/22/2025 0910 by Dunia Blue RN  Outcome: Progressing  6/22/2025 0014 by Penelope Antony RN  Outcome: Progressing

## 2025-06-22 NOTE — CARE COORDINATION
Discharge Planning Note:     Chart reviewed:IP LOS day 3    Risk Score: [facility] RISK OF UNPLANNED READMISSION 2.0             12 Total Score            Primary Care Physician is: Anahy Arriaza, APRN - CNP       Primary insurance is:MEDICARE PART A AND B      Writer SHAMAR browne/Apollo with [facility] [phone] regarding referrals sent. This is patients first preference, awaiting return call. 2nd is [facility] and 3rd is [facility]. Referrals sent to 2nd and 3rd preferences.     Case management will continue to follow progress and update discharge plan as needed.       Electronically signed by Angela Lamas RN on 6/22/2025 at 11:46 AM

## 2025-06-23 VITALS
WEIGHT: 168 LBS | DIASTOLIC BLOOD PRESSURE: 80 MMHG | HEIGHT: 65 IN | BODY MASS INDEX: 27.99 KG/M2 | RESPIRATION RATE: 16 BRPM | TEMPERATURE: 97.7 F | SYSTOLIC BLOOD PRESSURE: 158 MMHG | OXYGEN SATURATION: 97 % | HEART RATE: 72 BPM

## 2025-06-23 LAB
GLUCOSE BLD-MCNC: 119 MG/DL (ref 70–99)
GLUCOSE BLD-MCNC: 144 MG/DL (ref 70–99)
GLUCOSE BLD-MCNC: 63 MG/DL (ref 70–99)
PERFORMED ON: ABNORMAL

## 2025-06-23 PROCEDURE — 6360000002 HC RX W HCPCS: Performed by: NURSE PRACTITIONER

## 2025-06-23 PROCEDURE — 6370000000 HC RX 637 (ALT 250 FOR IP): Performed by: NURSE PRACTITIONER

## 2025-06-23 PROCEDURE — 6360000002 HC RX W HCPCS: Performed by: INTERNAL MEDICINE

## 2025-06-23 PROCEDURE — 2580000003 HC RX 258: Performed by: INTERNAL MEDICINE

## 2025-06-23 RX ADMIN — SODIUM CHLORIDE 3000 MG: 9 INJECTION, SOLUTION INTRAVENOUS at 08:30

## 2025-06-23 RX ADMIN — ENOXAPARIN SODIUM 40 MG: 100 INJECTION SUBCUTANEOUS at 08:24

## 2025-06-23 RX ADMIN — SODIUM CHLORIDE 3000 MG: 9 INJECTION, SOLUTION INTRAVENOUS at 02:04

## 2025-06-23 RX ADMIN — SODIUM CHLORIDE 3000 MG: 9 INJECTION, SOLUTION INTRAVENOUS at 14:46

## 2025-06-23 RX ADMIN — LISINOPRIL 10 MG: 10 TABLET ORAL at 08:24

## 2025-06-23 RX ADMIN — GABAPENTIN 300 MG: 300 CAPSULE ORAL at 08:24

## 2025-06-23 RX ADMIN — AMLODIPINE BESYLATE 10 MG: 5 TABLET ORAL at 08:24

## 2025-06-23 NOTE — PROGRESS NOTES
Hospitalist Progress Note      PCP: Anahy Arriaza APRN - CNP    Date of Admission: 6/19/2025    LOS: 2    Chief Complaint:   Chief Complaint   Patient presents with    Fatigue     Pt arrives from home by [facility]. Pt C/O fatigue for a wk. Per EMS, pt's family believes she is having a UTI d/t similar symptoms she is having.        Case Summary:   80-year-old lady with history of type 2 diabetes, hypertension, hyperlipidemia who presented with generalized malaise weakness and fatigue found to have urinary tract infection        Principal Problem:    Acute cystitis without hematuria: Reports feeling better today.  Improved.  Urine cultures with E faecalis  - Will complete 3 doses of ceftriaxone  - Follow-up urine cultures sensitivity      Physical debility: Seen PT OT with poor functional status.  Will benefit from SNF    Active Problems:    Type 2 diabetes mellitus with other specified complication (HCC): Sliding-scale insulin with glargine    Essential hypertension: Stable on amlodipine, lisinopril    Hyperlipidemia: On Lipitor        Medications:  Reviewed  Infusion Medications    dextrose      sodium chloride       Scheduled Medications    amLODIPine  10 mg Oral Daily    atorvastatin  40 mg Oral Nightly    gabapentin  300 mg Oral BID    insulin glargine  12 Units SubCUTAneous Nightly    lisinopril  10 mg Oral Daily    insulin lispro  0-8 Units SubCUTAneous 4x Daily AC & HS    sodium chloride flush  5-40 mL IntraVENous 2 times per day    enoxaparin  40 mg SubCUTAneous Daily    cefTRIAXone (ROCEPHIN) IV  1,000 mg IntraVENous Q24H     PRN Meds: dextrose bolus **OR** dextrose bolus, glucagon (rDNA), dextrose, sodium chloride flush, sodium chloride, potassium chloride **OR** potassium alternative oral replacement **OR** potassium chloride, magnesium sulfate, ondansetron **OR** ondansetron, polyethylene glycol, acetaminophen **OR** acetaminophen, hydrALAZINE      DVT Prophylaxis: Subcut 
      Hospitalist Progress Note      PCP: Anahy Arriaza, APRN - CNP    Date of Admission: 6/19/2025    LOS: 1    Chief Complaint:   Chief Complaint   Patient presents with    Fatigue     Pt arrives from home by [facility]. Pt C/O fatigue for a wk. Per EMS, pt's family believes she is having a UTI d/t similar symptoms she is having.        Case Summary:   80-year-old lady with history of type 2 diabetes, hypertension, hyperlipidemia who presented with generalized malaise weakness and fatigue found to have urinary tract infection        Principal Problem:    Acute cystitis without hematuria: Reports feeling better today.  Will continue 3 doses of IV ceftriaxone.  Follow-up urine cultures.      Physical debility: PT OT in view of discharge planning    Active Problems:    Type 2 diabetes mellitus with other specified complication (HCC): Sliding-scale insulin with glargine    Essential hypertension: Stable on amlodipine, lisinopril    Hyperlipidemia: On Lipitor        Medications:  Reviewed  Infusion Medications    dextrose      sodium chloride       Scheduled Medications    amLODIPine  10 mg Oral Daily    atorvastatin  40 mg Oral Nightly    gabapentin  300 mg Oral BID    insulin glargine  12 Units SubCUTAneous Nightly    lisinopril  10 mg Oral Daily    insulin lispro  0-8 Units SubCUTAneous 4x Daily AC & HS    sodium chloride flush  5-40 mL IntraVENous 2 times per day    enoxaparin  40 mg SubCUTAneous Daily    cefTRIAXone (ROCEPHIN) IV  1,000 mg IntraVENous Q24H     PRN Meds: dextrose bolus **OR** dextrose bolus, glucagon (rDNA), dextrose, sodium chloride flush, sodium chloride, potassium chloride **OR** potassium alternative oral replacement **OR** potassium chloride, magnesium sulfate, ondansetron **OR** ondansetron, polyethylene glycol, acetaminophen **OR** acetaminophen, hydrALAZINE      DVT Prophylaxis: Subcut enoxaparin  Diet: ADULT DIET; Regular; 4 carb choices (60 gm/meal)  Code Status: Full 
      Hospitalist Progress Note      PCP: Anahy Arriaza, APRN - CNP    Date of Admission: 6/19/2025    LOS: 3    Chief Complaint:   Chief Complaint   Patient presents with    Fatigue     Pt arrives from home by [facility]. Pt C/O fatigue for a wk. Per EMS, pt's family believes she is having a UTI d/t similar symptoms she is having.        Case Summary:   80-year-old lady with history of type 2 diabetes, hypertension, hyperlipidemia who presented with generalized malaise weakness and fatigue found to have urinary tract infection        Principal Problem:    Acute cystitis without hematuria: Reports feeling better today.  Improved.  Urine cultures with E faecalis and sensitivities noted.  Will switch antibiotics to ampicillin      Physical debility: Seen PT OT with poor functional status.  Will benefit from SNF.  Awaiting placement    Active Problems:    Type 2 diabetes mellitus with other specified complication (HCC): Sliding-scale insulin with glargine    Essential hypertension: Stable on amlodipine, lisinopril    Hyperlipidemia: On Lipitor        Medications:  Reviewed  Infusion Medications    dextrose      sodium chloride       Scheduled Medications    amLODIPine  10 mg Oral Daily    atorvastatin  40 mg Oral Nightly    gabapentin  300 mg Oral BID    insulin glargine  12 Units SubCUTAneous Nightly    lisinopril  10 mg Oral Daily    insulin lispro  0-8 Units SubCUTAneous 4x Daily AC & HS    sodium chloride flush  5-40 mL IntraVENous 2 times per day    enoxaparin  40 mg SubCUTAneous Daily    cefTRIAXone (ROCEPHIN) IV  1,000 mg IntraVENous Q24H     PRN Meds: dextrose bolus **OR** dextrose bolus, glucagon (rDNA), dextrose, sodium chloride flush, sodium chloride, potassium chloride **OR** potassium alternative oral replacement **OR** potassium chloride, magnesium sulfate, ondansetron **OR** ondansetron, polyethylene glycol, acetaminophen **OR** acetaminophen, hydrALAZINE      DVT Prophylaxis: Subcut 
      Hospitalist Progress Note      PCP: Anahy Arriaza, APRN - CNP    Date of Admission: 6/19/2025    LOS: 4    Chief Complaint:   Chief Complaint   Patient presents with    Fatigue     Pt arrives from home by [facility]. Pt C/O fatigue for a wk. Per EMS, pt's family believes she is having a UTI d/t similar symptoms she is having.        Case Summary:   80-year-old lady with history of type 2 diabetes, hypertension, hyperlipidemia who presented with generalized malaise weakness and fatigue found to have urinary tract infection.  She is complicated by physical deconditioning requiring rehab    Principal Problem:    Acute cystitis without hematuria: Reports feeling better today.  Improved.  Urine cultures with E faecalis and sensitivities noted.  Antibiotics were switched to Unasyn based on sensitivities.  Will switch to Augmentin at discharge to complete 5 to 7-day therapy.      Physical debility: Seen PT OT with poor functional status.  Will benefit from SNF.  Awaiting placement    Active Problems:    Type 2 diabetes mellitus with other specified complication (HCC): Sliding-scale insulin with glargine    Essential hypertension: Stable on amlodipine, lisinopril    Hyperlipidemia: On Lipitor        Medications:  Reviewed  Infusion Medications    dextrose      sodium chloride       Scheduled Medications    ampicillin-sulbactam  3,000 mg IntraVENous Q6H    amLODIPine  10 mg Oral Daily    atorvastatin  40 mg Oral Nightly    gabapentin  300 mg Oral BID    insulin glargine  12 Units SubCUTAneous Nightly    lisinopril  10 mg Oral Daily    insulin lispro  0-8 Units SubCUTAneous 4x Daily AC & HS    sodium chloride flush  5-40 mL IntraVENous 2 times per day    enoxaparin  40 mg SubCUTAneous Daily     PRN Meds: dextrose bolus **OR** dextrose bolus, glucagon (rDNA), dextrose, sodium chloride flush, sodium chloride, potassium chloride **OR** potassium alternative oral replacement **OR** potassium chloride, magnesium 
4 Eyes Skin Assessment     NAME:  Naomie Saturday  YOB: 1945  MEDICAL RECORD NUMBER:  [id number]    The patient is being assessed for  Admission    I agree that at least one RN has performed a thorough Head to Toe Skin Assessment on the patient. ALL assessment sites listed below have been assessed.      Areas assessed by both nurses:    Head, Face, Ears, Shoulders, Back, Chest, Arms, Elbows, Hands, Sacrum. Buttock, Coccyx, Ischium, Legs. Feet and Heels, and Under Medical Devices         Does the Patient have a Wound? No noted wound(s)       Donn Prevention initiated by RN: No  Wound Care Orders initiated by RN: No    Pressure Injury (Stage 3,4, Unstageable, DTI, NWPT, and Complex wounds) if present, place Wound referral order by RN under : No    New Ostomies, if present place, Ostomy referral order under : No     Nurse 1 eSignature: Electronically signed by Juvenal Pantoja RN on 6/20/25 at 2:48 AM EDT    **SHARE this note so that the co-signing nurse can place an eSignature**    Nurse 2 eSignature: Electronically signed by Sho Mckeon RN on 6/20/25 at 2:50 AM EDT    
Pharmacy Home Medication Reconciliation Note    A medication reconciliation has been completed for Naomie Saturday 1945    Pharmacy: [facility]  Information provided by: patient and son    The patient's home medication list is as follows:  No current facility-administered medications on file prior to encounter.     Current Outpatient Medications on File Prior to Encounter   Medication Sig Dispense Refill    meclizine (ANTIVERT) 25 MG tablet Take 1 tablet by mouth 3 times daily as needed for Dizziness (Vertigo.)      aspirin 81 MG chewable tablet Take 2 tablets by mouth daily      polyethylene glycol (GLYCOLAX) 17 GM/SCOOP powder Take 17 g by mouth daily      insulin glargine (LANTUS;BASAGLAR) 100 UNIT/ML injection pen Inject 12 Units into the skin nightly 3.6 mL 0    metFORMIN (GLUCOPHAGE) 500 MG tablet Take 1 tablet by mouth 2 times daily (with meals)      gabapentin (NEURONTIN) 300 MG capsule Take 1 capsule by mouth 2 times daily.      amLODIPine (NORVASC) 10 MG tablet Take 1 tablet by mouth daily      DULoxetine (CYMBALTA) 20 MG extended release capsule Take 20 mg by mouth daily (Patient not taking: Reported on 6/19/2025)      lisinopril (PRINIVIL;ZESTRIL) 10 MG tablet Take 1 tablet by mouth daily (Patient not taking: Reported on 6/19/2025) 30 tablet 3    atorvastatin (LIPITOR) 40 MG tablet Take 1 tablet by mouth nightly (Patient not taking: Reported on 6/19/2025) 30 tablet 3    clopidogrel (PLAVIX) 75 MG tablet Take 1 tablet by mouth daily (Patient not taking: Reported on 6/19/2025) 30 tablet 3       Patient is no longer taking atorvastatin, Plavix, duloxetine, or lisinopril.    Of note, patient took all AM meds prior to ED arrival.    Timing of last doses updated.    Thank you,  Mary Alves, JOSEhT      
Report called to nurse Medrano at [facility]. All questions answered.   
Shift assessment completed, VSS on RA, patient A&Ox4 up in bed enjoying breakfast at this time. Patient c/o BLE weakness and balance issue. No c/o pain at this time. Scheduled med given per MAR, POC and education reviewed with the patient. Safety measures provided. All needs met at this time, call light in reach, will continue to monitor.   
Shift assessment completed, VSS on RA, pt A&Ox4 up in bed. Patient denies any pain at this time. Still c/o BLE weakness and fatigue. Scheduled med given per MAR, POC and education reviewed with the patient. Ext cath draining clear yellow output. Safety measures provided. All needs met at this time, call light in reach, will continue to monitor.   
Shift assessment completed, bp 157/78, other VSS on RA. Patient A&Ox4 up in bed, denies any pain at this time. Scheduled med given per MAR, POC and education reviewed with the patient. Safety measures provided. All needs met at this time, call light in reach, will continue to monitor.   
Shift assessment completed. VSS. Alert and oriented x 4. Resting in bed quietly. Denies having any pain or discomfort at this time. Medications given per MAR. The care plan and education have been reviewed and mutually agreed upon with the patient. Call light in reach.  
Layout: patient does not use the shower stall, takes sponge bath  Bathroom Equipment: none   Toilet Height: bedside commode  Home Equipment: manual wheelchair, hospital bed, bedside commode   Transfer Assistance: requires assistance- states that her son picks her up and transfers her to/from a w/c  Ambulation Assistance: patient stated does not walk, sits in wheelchair and propels around   ADL Assistance: reports needs assist with sponge bathing   IADL Assistance: family performs   Active :        [] Yes                 [x] No  Hand Dominance: [] Left                 [] Right  Current Employment: retired.  Occupation: [employer]  Recent Falls: no falls in last 6 months   Available Assistance at Discharge: available PRN-- patient stated is home alone for a short period of time, both son and granddaughter work   Comment: Pt is a poor historian, giving mixed answers to PLOF questions    Examination   Vision:   Vision Gross Assessment: Impaired and Vision Corrective Device: wears glasses for reading  Hearing:   hard of hearing, left hearing aid- L hearing aide not present at the hospital  Observation:   General Observation:  Pt on RA on arrival. Purewick in place.  Posture:   Mild forward head, rounded shoulders, and increased thoracic kyphosis in sitting and standing.  Sensation:   Unable to formally test secondary to cognition  ROM:   (B) LE AROM WFL  Strength:   (B) LE strength grossly decreased based on observed functional mobility  Therapist Clinical Decision Making (Complexity): medium complexity  Clinical Presentation: evolving      Subjective  General: Pt semi-fowlers in bed on arrival, pleasant and agreeable to participate in PT initial evaluation. Pt very verbose and tangential throughout the session, difficult to redirect.  Pain: 0/10  Pain Interventions: not applicable     Functional Mobility  Bed Mobility:  Supine to Sit: maximum assistance  Scooting: maximum assistance- toward EOB  Comments: Supine to sit: 
understanding    Assessment  Activity Tolerance: Fair  Impairments Requiring Therapeutic Intervention: decreased functional mobility, decreased ADL status, decreased cognition, decreased endurance  Prognosis: good and fair  Clinical Assessment: Patient admitted to hospital for acute cystitis without hematuria. Patient needing extensive assist for ADLs and mobility. Patient poor historian and contradicted self multiple times, difficulty staying on task. Patient would benefit from skilled OT treatment, at this time recommend SNF. No family present during evaluation.   Safety Interventions: patient left in chair, chair alarm in place, call light within reach, and nurse notified    Plan  Frequency: 3-5 x/per week  Current Treatment Recommendations: strengthening, balance training, functional mobility training, transfer training, endurance training, patient/caregiver education, ADL/self-care training, cognitive/perceptual training, cognitive reorientation, and equipment evaluation/education    Goals  Patient Goals: go to [facility]   Short Term Goals:  Time Frame: until discharge   Patient will complete lower body ADL at moderate assistance   Patient will complete toileting at moderate assistance   Patient will complete functional transfers at moderate assistance   Patient will complete functional mobility at moderate assistance   Patient will increase [facility] ADL score = to or > than 18/24    Above goals reviewed on 6/20/2025.  All goals are ongoing at this time unless indicated above.       Therapy Session Time     Individual Group Co-treatment   Time In    0946   Time Out    1028   Minutes    42        Timed Code Treatment Minutes:  27    Total Treatment Minutes:  42       Electronically Signed By: NAJMA Medeiros/JONO [id number]

## 2025-06-23 NOTE — CARE COORDINATION
06/23/25 1237   IMM Letter   IMM Letter given to Patient/Family/Significant other/Guardian/POA/by: IMM given   IMM Letter date given: 06/23/25   IMM Letter time given: 1237

## 2025-06-23 NOTE — DISCHARGE INSTR - COC
Fair    Recommended Labs or Other Treatments After Discharge:   PT, OT    Physician Certification: I certify the above information and transfer of Naomie Saturday  is necessary for the continuing treatment of the diagnosis listed and that she requires Skilled Nursing Facility for less 30 days.     Update Admission H&P: No change in H&P    PHYSICIAN SIGNATURE:  Electronically signed by Allie Dominguez MD on 6/23/25 at 1:51 PM EDT

## 2025-06-23 NOTE — CARE COORDINATION
Case Management -  Discharge Note      Patient Name: Naomie Saturday                   YOB: 1945  Room: [address] [place], [place] [id number]            Readmission Risk (Low < 19, Mod (19-27), High > 27): Readmission Risk Score: 12.2    Current PCP: Anahy Arriaza APRN - CNP      (IMM) Important Message from Medicare:    Has pt received appropriate compliance notices before being discharged if required: yes  Compliance doc:  [x] 2nd IMM; [] Code 44 [] Parra  Date Given: 6/23/25 Given By: GABBI    PT AM-PAC: 9 /24  OT AM-PAC: 13 /24    Patient/patient representative has been educated on the benefits of SNF as well as the possible risks of declining recommended services. Patient/patient representative has acknowledged the information provided and decided on the following discharge plan. Patient/ patient representative has been provided freedom of choice regarding service provider, supported by basic dialogue that supports the patient's individualized plan of care/goals.    Patient noted to have a discharge order.  Pt has been medically cleared for transition to Skilled Nursing Rehab Facility    [place] [address]  Phone: [phone]  Fax: [phone]         HENS Completed:  yes  Pre-cert required/obtained:  n/a    Transportation scheduled for 6/23/25 at 3:30 pm  Transportation provided by [facility] [phone]  [facility] faxed and agency notified:  yes  The following prescriptions sent with pt: n/a  Family Notified:  yes- patient notified grand-daughter    Nurse to call report to facility    [facility] agency notified of discharge:  [] Yes [] No  [x] NA    Family notified of discharge:  [x] Yes  [] No  [] NA    Facility notified of discharge:  [x] Yes  [] No  [] NA    Pt is being discharged with Outpt IV Antibiotics  [] Yes [] No  [x] NA  If yes, make sure BRANDEN is faxed to [facility] agency, and meds are called in to pharmacy by RN from BRANDEN orders only.